# Patient Record
Sex: MALE | Race: WHITE | NOT HISPANIC OR LATINO | Employment: OTHER | URBAN - NONMETROPOLITAN AREA
[De-identification: names, ages, dates, MRNs, and addresses within clinical notes are randomized per-mention and may not be internally consistent; named-entity substitution may affect disease eponyms.]

---

## 2017-01-24 RX ORDER — VALSARTAN AND HYDROCHLOROTHIAZIDE 320; 25 MG/1; MG/1
1 TABLET, FILM COATED ORAL DAILY
Qty: 30 TABLET | Refills: 5 | Status: SHIPPED | OUTPATIENT
Start: 2017-01-24 | End: 2017-01-25 | Stop reason: SDUPTHER

## 2017-01-25 RX ORDER — VALSARTAN AND HYDROCHLOROTHIAZIDE 320; 25 MG/1; MG/1
1 TABLET, FILM COATED ORAL DAILY
Qty: 30 TABLET | Refills: 0 | Status: SHIPPED | OUTPATIENT
Start: 2017-01-25 | End: 2017-02-24

## 2017-01-25 RX ORDER — PRAVASTATIN SODIUM 40 MG
40 TABLET ORAL DAILY
Qty: 90 TABLET | Refills: 0 | Status: SHIPPED | OUTPATIENT
Start: 2017-01-25 | End: 2017-03-22 | Stop reason: SDUPTHER

## 2017-02-24 RX ORDER — VALSARTAN AND HYDROCHLOROTHIAZIDE 320; 25 MG/1; MG/1
1 TABLET, FILM COATED ORAL DAILY
Qty: 30 TABLET | Refills: 0 | Status: CANCELLED | OUTPATIENT
Start: 2017-02-24 | End: 2018-02-24

## 2017-02-24 RX ORDER — VALSARTAN AND HYDROCHLOROTHIAZIDE 320; 25 MG/1; MG/1
1 TABLET, FILM COATED ORAL DAILY
Qty: 30 TABLET | Refills: 0 | Status: SHIPPED | OUTPATIENT
Start: 2017-02-24 | End: 2017-03-22 | Stop reason: SDUPTHER

## 2017-03-01 DIAGNOSIS — R53.81 MALAISE AND FATIGUE: ICD-10-CM

## 2017-03-01 DIAGNOSIS — E55.9 VITAMIN D DEFICIENCY: ICD-10-CM

## 2017-03-01 DIAGNOSIS — E61.1 IRON DEFICIENCY: ICD-10-CM

## 2017-03-01 DIAGNOSIS — R53.83 MALAISE AND FATIGUE: ICD-10-CM

## 2017-03-01 DIAGNOSIS — R73.9 HYPERGLYCEMIA: ICD-10-CM

## 2017-03-01 DIAGNOSIS — I10 ESSENTIAL HYPERTENSION: Primary | ICD-10-CM

## 2017-03-11 ENCOUNTER — APPOINTMENT (OUTPATIENT)
Dept: LAB | Facility: HOSPITAL | Age: 67
End: 2017-03-11

## 2017-03-11 LAB
25(OH)D3 SERPL-MCNC: 44.3 NG/ML (ref 30–100)
ALBUMIN SERPL-MCNC: 4.5 G/DL (ref 3.4–4.8)
ALBUMIN/GLOB SERPL: 1.4 G/DL (ref 1.1–1.8)
ALP SERPL-CCNC: 81 U/L (ref 38–126)
ALT SERPL W P-5'-P-CCNC: 32 U/L (ref 21–72)
ANION GAP SERPL CALCULATED.3IONS-SCNC: 12 MMOL/L (ref 5–15)
ARTICHOKE IGE QN: 67 MG/DL (ref 1–129)
AST SERPL-CCNC: 28 U/L (ref 17–59)
BASOPHILS # BLD AUTO: 0.04 10*3/MM3 (ref 0–0.2)
BASOPHILS NFR BLD AUTO: 0.8 % (ref 0–2)
BILIRUB SERPL-MCNC: 1 MG/DL (ref 0.2–1.3)
BUN BLD-MCNC: 15 MG/DL (ref 7–21)
BUN/CREAT SERPL: 12.8 (ref 7–25)
CALCIUM SPEC-SCNC: 9.8 MG/DL (ref 8.4–10.2)
CHLORIDE SERPL-SCNC: 99 MMOL/L (ref 95–110)
CHOLEST SERPL-MCNC: 137 MG/DL (ref 0–199)
CO2 SERPL-SCNC: 29 MMOL/L (ref 22–31)
CREAT BLD-MCNC: 1.17 MG/DL (ref 0.7–1.3)
DEPRECATED RDW RBC AUTO: 41.1 FL (ref 35.1–43.9)
EOSINOPHIL # BLD AUTO: 0.23 10*3/MM3 (ref 0–0.7)
EOSINOPHIL NFR BLD AUTO: 4.4 % (ref 0–7)
ERYTHROCYTE [DISTWIDTH] IN BLOOD BY AUTOMATED COUNT: 13.5 % (ref 11.5–14.5)
GFR SERPL CREATININE-BSD FRML MDRD: 62 ML/MIN/1.73 (ref 60–113)
GLOBULIN UR ELPH-MCNC: 3.3 GM/DL (ref 2.3–3.5)
GLUCOSE BLD-MCNC: 110 MG/DL (ref 60–100)
HBA1C MFR BLD: 6.28 % (ref 4–5.6)
HCT VFR BLD AUTO: 48.4 % (ref 39–49)
HDLC SERPL-MCNC: 43 MG/DL (ref 60–200)
HGB BLD-MCNC: 17 G/DL (ref 13.7–17.3)
IMM GRANULOCYTES # BLD: 0 10*3/MM3 (ref 0–0.02)
IMM GRANULOCYTES NFR BLD: 0 % (ref 0–0.5)
IRON 24H UR-MRATE: 120 MCG/DL (ref 49–181)
LDLC/HDLC SERPL: 1.74 {RATIO} (ref 0–3.55)
LYMPHOCYTES # BLD AUTO: 1.17 10*3/MM3 (ref 0.6–4.2)
LYMPHOCYTES NFR BLD AUTO: 22.6 % (ref 10–50)
MAGNESIUM SERPL-MCNC: 1.8 MG/DL (ref 1.6–2.3)
MCH RBC QN AUTO: 29.3 PG (ref 26.5–34)
MCHC RBC AUTO-ENTMCNC: 35.1 G/DL (ref 31.5–36.3)
MCV RBC AUTO: 83.4 FL (ref 80–98)
MONOCYTES # BLD AUTO: 0.63 10*3/MM3 (ref 0–0.9)
MONOCYTES NFR BLD AUTO: 12.2 % (ref 0–12)
NEUTROPHILS # BLD AUTO: 3.11 10*3/MM3 (ref 2–8.6)
NEUTROPHILS NFR BLD AUTO: 60 % (ref 37–80)
NRBC BLD MANUAL-RTO: 0 /100 WBC (ref 0–0)
PLATELET # BLD AUTO: 177 10*3/MM3 (ref 150–450)
PMV BLD AUTO: 9.8 FL (ref 8–12)
POTASSIUM BLD-SCNC: 4.3 MMOL/L (ref 3.5–5.1)
PROT SERPL-MCNC: 7.8 G/DL (ref 6.3–8.6)
RBC # BLD AUTO: 5.8 10*6/MM3 (ref 4.37–5.74)
SODIUM BLD-SCNC: 140 MMOL/L (ref 137–145)
TRIGL SERPL-MCNC: 95 MG/DL (ref 20–199)
TSH SERPL DL<=0.05 MIU/L-ACNC: 2.68 MIU/ML (ref 0.46–4.68)
VIT B12 BLD-MCNC: 588 PG/ML (ref 239–931)
WBC NRBC COR # BLD: 5.18 10*3/MM3 (ref 3.2–9.8)

## 2017-03-11 PROCEDURE — 80053 COMPREHEN METABOLIC PANEL: CPT | Performed by: NURSE PRACTITIONER

## 2017-03-11 PROCEDURE — 84443 ASSAY THYROID STIM HORMONE: CPT | Performed by: NURSE PRACTITIONER

## 2017-03-11 PROCEDURE — 82607 VITAMIN B-12: CPT | Performed by: NURSE PRACTITIONER

## 2017-03-11 PROCEDURE — 80061 LIPID PANEL: CPT | Performed by: NURSE PRACTITIONER

## 2017-03-11 PROCEDURE — 82306 VITAMIN D 25 HYDROXY: CPT | Performed by: NURSE PRACTITIONER

## 2017-03-11 PROCEDURE — 83036 HEMOGLOBIN GLYCOSYLATED A1C: CPT | Performed by: NURSE PRACTITIONER

## 2017-03-11 PROCEDURE — 83540 ASSAY OF IRON: CPT | Performed by: NURSE PRACTITIONER

## 2017-03-11 PROCEDURE — 83735 ASSAY OF MAGNESIUM: CPT | Performed by: NURSE PRACTITIONER

## 2017-03-11 PROCEDURE — 85025 COMPLETE CBC W/AUTO DIFF WBC: CPT | Performed by: NURSE PRACTITIONER

## 2017-03-22 ENCOUNTER — APPOINTMENT (OUTPATIENT)
Dept: LAB | Facility: HOSPITAL | Age: 67
End: 2017-03-22

## 2017-03-22 ENCOUNTER — OFFICE VISIT (OUTPATIENT)
Dept: FAMILY MEDICINE CLINIC | Facility: CLINIC | Age: 67
End: 2017-03-22

## 2017-03-22 VITALS
BODY MASS INDEX: 33.04 KG/M2 | DIASTOLIC BLOOD PRESSURE: 72 MMHG | SYSTOLIC BLOOD PRESSURE: 128 MMHG | HEIGHT: 71 IN | WEIGHT: 236 LBS

## 2017-03-22 DIAGNOSIS — Z12.5 SCREENING FOR PROSTATE CANCER: Primary | ICD-10-CM

## 2017-03-22 DIAGNOSIS — R53.81 MALAISE: ICD-10-CM

## 2017-03-22 DIAGNOSIS — I10 ESSENTIAL HYPERTENSION: ICD-10-CM

## 2017-03-22 DIAGNOSIS — Z00.00 GENERAL MEDICAL EXAM: ICD-10-CM

## 2017-03-22 DIAGNOSIS — R09.89 BRUIT OF LEFT CAROTID ARTERY: ICD-10-CM

## 2017-03-22 DIAGNOSIS — E78.2 MIXED HYPERLIPIDEMIA: ICD-10-CM

## 2017-03-22 LAB — PSA SERPL-MCNC: 1.09 NG/ML (ref 0–4)

## 2017-03-22 PROCEDURE — 36415 COLL VENOUS BLD VENIPUNCTURE: CPT | Performed by: NURSE PRACTITIONER

## 2017-03-22 PROCEDURE — 99214 OFFICE O/P EST MOD 30 MIN: CPT | Performed by: NURSE PRACTITIONER

## 2017-03-22 PROCEDURE — 84153 ASSAY OF PSA TOTAL: CPT | Performed by: NURSE PRACTITIONER

## 2017-03-22 PROCEDURE — 80074 ACUTE HEPATITIS PANEL: CPT | Performed by: NURSE PRACTITIONER

## 2017-03-22 RX ORDER — AMLODIPINE BESYLATE 10 MG/1
10 TABLET ORAL DAILY
Qty: 90 TABLET | Refills: 3 | Status: SHIPPED | OUTPATIENT
Start: 2017-03-22 | End: 2018-03-23

## 2017-03-22 RX ORDER — RANITIDINE 150 MG/1
150 TABLET ORAL 2 TIMES DAILY
COMMUNITY
End: 2022-06-22 | Stop reason: RX

## 2017-03-22 RX ORDER — HYDROXYCHLOROQUINE SULFATE 200 MG/1
200 TABLET, FILM COATED ORAL 2 TIMES DAILY
Qty: 180 TABLET | Refills: 3 | Status: SHIPPED | OUTPATIENT
Start: 2017-03-22 | End: 2018-01-17

## 2017-03-22 RX ORDER — VALSARTAN AND HYDROCHLOROTHIAZIDE 320; 25 MG/1; MG/1
1 TABLET, FILM COATED ORAL DAILY
Qty: 90 TABLET | Refills: 3 | Status: SHIPPED | OUTPATIENT
Start: 2017-03-22 | End: 2018-01-17

## 2017-03-22 RX ORDER — PRAVASTATIN SODIUM 40 MG
40 TABLET ORAL DAILY
Qty: 90 TABLET | Refills: 3 | Status: SHIPPED | OUTPATIENT
Start: 2017-03-22 | End: 2018-03-23

## 2017-03-22 NOTE — PROGRESS NOTES
Chief Complaint   Patient presents with   • Follow-up     6 month nahid and refills   • Diabetes   • Hypertension   • Hyperlipidemia     Subjective   Henrik Sands is a 67 y.o. male.     Diabetes   He presents for his follow-up diabetic visit. Diabetes type: hyperglycemia only for now  No MedicAlert identification noted. Hypoglycemia symptoms include headaches. Pertinent negatives for hypoglycemia include no pallor or sweats. Associated symptoms include fatigue. Pertinent negatives for diabetes include no blurred vision and no chest pain. Pertinent negatives for diabetic complications include no CVA, PVD or retinopathy. Risk factors for coronary artery disease include obesity, male sex and sedentary lifestyle. Current diabetic treatment includes diet. He is compliant with treatment some of the time. He rarely participates in exercise. He does not see a podiatrist.Eye exam is current.   Hypertension   This is a recurrent problem. The current episode started more than 1 year ago. The problem has been gradually improving since onset. The problem is controlled. Associated symptoms include headaches, malaise/fatigue and neck pain. Pertinent negatives include no anxiety, blurred vision, chest pain, orthopnea, palpitations, peripheral edema, PND, shortness of breath or sweats. There are no associated agents to hypertension. Risk factors for coronary artery disease include stress and post-menopausal state. Past treatments include ACE inhibitors, angiotensin blockers and alpha 1 blockers. The current treatment provides mild improvement. Compliance problems include diet.  There is no history of angina, kidney disease, CAD/MI, CVA, heart failure, left ventricular hypertrophy, PVD, renovascular disease, retinopathy or a thyroid problem. There is no history of chronic renal disease, coarctation of the aorta, hyperaldosteronism, hypercortisolism, hyperparathyroidism, a hypertension causing med, pheochromocytoma or sleep  apnea.   Hyperlipidemia   This is a chronic problem. The current episode started more than 1 year ago. The problem is controlled. Recent lipid tests were reviewed and are normal. Exacerbating diseases include obesity. He has no history of chronic renal disease, diabetes, hypothyroidism, liver disease or nephrotic syndrome. There are no known factors aggravating his hyperlipidemia. Associated symptoms include myalgias. Pertinent negatives include no chest pain, focal sensory loss, leg pain or shortness of breath. Current antihyperlipidemic treatment includes diet change and statins. The current treatment provides significant improvement of lipids. Compliance problems include adherence to diet.  Risk factors for coronary artery disease include male sex, obesity and a sedentary lifestyle.        The following portions of the patient's history were reviewed and updated as appropriate: allergies, current medications, past social history and problem list.    Review of Systems   Constitutional: Positive for activity change, appetite change, fatigue, malaise/fatigue and unexpected weight change. Negative for chills, diaphoresis and fever.   HENT: Negative for sinus pressure, sneezing, sore throat, tinnitus, trouble swallowing and voice change.    Eyes: Positive for visual disturbance. Negative for blurred vision, discharge and itching.        Chronic vision loss    Respiratory: Negative.  Negative for apnea, choking, chest tightness and shortness of breath.    Cardiovascular: Negative.  Negative for chest pain, palpitations, orthopnea and PND.   Gastrointestinal: Negative.    Endocrine: Negative.    Genitourinary: Positive for frequency. Negative for decreased urine volume.   Musculoskeletal: Positive for arthralgias, back pain, gait problem, joint swelling, myalgias, neck pain and neck stiffness.   Skin: Negative.  Negative for color change and pallor.   Allergic/Immunologic: Negative.    Neurological: Positive for  "headaches.   Hematological: Negative.    Psychiatric/Behavioral: Negative.        Objective   /72  Ht 71\" (180.3 cm)  Wt 236 lb (107 kg)  BMI 32.92 kg/m2  Physical Exam   Constitutional: He appears well-developed and well-nourished.   HENT:   Head: Normocephalic and atraumatic.   Eyes: EOM are normal. Pupils are equal, round, and reactive to light.   Neck: Normal range of motion. Neck supple.   Cardiovascular: Normal rate, regular rhythm and normal heart sounds.  Exam reveals no gallop and no friction rub.    No murmur heard.  Bruit left carotid mild    Pulmonary/Chest: Effort normal and breath sounds normal.   Abdominal: Soft. Bowel sounds are normal.   Genitourinary: Rectum normal and penis normal.   Musculoskeletal: Normal range of motion.   Neurological: He is alert.   Skin: Skin is warm.   Psychiatric: He has a normal mood and affect.   Nursing note and vitals reviewed.      Assessment/Plan   Problem List Items Addressed This Visit        Cardiovascular and Mediastinum    Bruit of left carotid artery    Relevant Orders    Duplex Carotid Ultrasound CAR    Essential hypertension    Mixed hyperlipidemia       Other    General medical exam    Relevant Orders    Hepatitis panel, acute    Screening for prostate cancer - Primary    Relevant Orders    PSA    Hepatitis panel, acute    Malaise           New Medications Ordered This Visit   Medications   • raNITIdine (ZANTAC) 150 MG tablet     Sig: Take 150 mg by mouth 2 (Two) Times a Day.       It's not just what you eat, but when you eat  Eat breakfast, and eat smaller meals throughout the day. A healthy breakfast can jumpstart your metabolism, while eating small, healthy meals (rather than the standard three large meals) keeps your energy up.   Avoid eating at night. Try to eat dinner earlier and fast for 14-16 hours until breakfast the next morning. Studies suggest that eating only when you’re most active and giving your digestive system a long break each day " may help to regulate weight.     Labs in 6 months -recheck with a1c-monitor diet closely

## 2017-03-24 LAB
HAV IGM SERPL QL IA: NEGATIVE
HBV CORE IGM SERPL QL IA: NEGATIVE
HBV SURFACE AG SERPL QL IA: NEGATIVE
HCV AB SER DONR QL: NEGATIVE

## 2017-04-03 DIAGNOSIS — R09.89 BRUIT OF LEFT CAROTID ARTERY: Primary | ICD-10-CM

## 2017-04-04 LAB
BH CV XLRA MEAS LEFT DIST CCA EDV: 15 CM/SEC
BH CV XLRA MEAS LEFT DIST CCA PSV: 136 CM/SEC
BH CV XLRA MEAS LEFT DIST ICA EDV: 22 CM/SEC
BH CV XLRA MEAS LEFT DIST ICA PSV: 104 CM/SEC
BH CV XLRA MEAS LEFT MID ICA EDV: 17 CM/SEC
BH CV XLRA MEAS LEFT MID ICA PSV: 78 CM/SEC
BH CV XLRA MEAS LEFT PROX CCA EDV: 17 CM/SEC
BH CV XLRA MEAS LEFT PROX CCA PSV: 148 CM/SEC
BH CV XLRA MEAS LEFT PROX ECA EDV: 11 CM/SEC
BH CV XLRA MEAS LEFT PROX ECA PSV: 151 CM/SEC
BH CV XLRA MEAS LEFT PROX ICA EDV: 16 CM/SEC
BH CV XLRA MEAS LEFT PROX ICA PSV: 106 CM/SEC
BH CV XLRA MEAS LEFT VERTEBRAL A PSV: 48 CM/SEC
BH CV XLRA MEAS RIGHT DIST CCA EDV: 19 CM/SEC
BH CV XLRA MEAS RIGHT DIST CCA PSV: 131 CM/SEC
BH CV XLRA MEAS RIGHT DIST ICA EDV: 14 CM/SEC
BH CV XLRA MEAS RIGHT DIST ICA PSV: 64 CM/SEC
BH CV XLRA MEAS RIGHT MID ICA EDV: 17 CM/SEC
BH CV XLRA MEAS RIGHT MID ICA PSV: 91 CM/SEC
BH CV XLRA MEAS RIGHT PROX CCA EDV: 12 CM/SEC
BH CV XLRA MEAS RIGHT PROX CCA PSV: 164 CM/SEC
BH CV XLRA MEAS RIGHT PROX ECA EDV: 10 CM/SEC
BH CV XLRA MEAS RIGHT PROX ECA PSV: 114 CM/SEC
BH CV XLRA MEAS RIGHT PROX ICA EDV: 10 CM/SEC
BH CV XLRA MEAS RIGHT PROX ICA PSV: 90 CM/SEC
BH CV XLRA MEAS RIGHT VERTEBRAL A EDV: 0 CM/SEC
BH CV XLRA MEAS RIGHT VERTEBRAL A PSV: 55 CM/SEC

## 2017-04-10 ENCOUNTER — TELEPHONE (OUTPATIENT)
Dept: FAMILY MEDICINE CLINIC | Facility: CLINIC | Age: 67
End: 2017-04-10

## 2017-04-10 NOTE — TELEPHONE ENCOUNTER
Pr NOHEMY Marshall's instruction  Jessie his wife has been called with his results    ---- Message from NOHEMY May sent at 4/7/2017 12:40 PM CDT -----  Can you let Jessie Sands know this looks good-under 50 is good- no changes needed-This is Jessie's  and he cannot hear-She works in [employer]

## 2017-06-29 RX ORDER — ALLOPURINOL 100 MG/1
100 TABLET ORAL 2 TIMES DAILY
Qty: 60 TABLET | Refills: 5 | Status: SHIPPED | OUTPATIENT
Start: 2017-06-29 | End: 2017-08-09 | Stop reason: SDUPTHER

## 2017-08-07 ENCOUNTER — TELEPHONE (OUTPATIENT)
Dept: FAMILY MEDICINE CLINIC | Facility: CLINIC | Age: 67
End: 2017-08-07

## 2017-08-07 NOTE — TELEPHONE ENCOUNTER
CLARA DOMINIQUE IS NEEDING A REFILL ON ALLOPURINOL TO BE SENT TO [facility]  YOU CAN CALL ME AT [phone] [phone] IF QUESTIONS

## 2017-08-09 RX ORDER — ALLOPURINOL 100 MG/1
100 TABLET ORAL 2 TIMES DAILY
Qty: 60 TABLET | Refills: 5 | Status: SHIPPED | OUTPATIENT
Start: 2017-08-09 | End: 2018-01-17

## 2017-08-25 ENCOUNTER — TELEPHONE (OUTPATIENT)
Dept: FAMILY MEDICINE CLINIC | Facility: CLINIC | Age: 67
End: 2017-08-25

## 2017-08-25 RX ORDER — LANCETS 28 GAUGE
EACH MISCELLANEOUS
Qty: 100 EACH | Refills: 12 | Status: SHIPPED | OUTPATIENT
Start: 2017-08-25 | End: 2018-11-19 | Stop reason: SDUPTHER

## 2017-08-25 NOTE — TELEPHONE ENCOUNTER
CLARA DOMINIQUE IS NEEDING A NEW PRESP FOR HIS FREE STYLE TEST STRIPS AND LANCETS TO BE SENT [facility]

## 2017-10-18 ENCOUNTER — TELEPHONE (OUTPATIENT)
Dept: FAMILY MEDICINE CLINIC | Facility: CLINIC | Age: 67
End: 2017-10-18

## 2018-01-17 RX ORDER — ALLOPURINOL 100 MG/1
100 TABLET ORAL
Qty: 60 TABLET | Refills: 5 | Status: SHIPPED | OUTPATIENT
Start: 2018-01-17 | End: 2018-08-07

## 2018-01-17 RX ORDER — VALSARTAN AND HYDROCHLOROTHIAZIDE 320; 25 MG/1; MG/1
1 TABLET, FILM COATED ORAL DAILY
Qty: 90 TABLET | Refills: 3 | Status: SHIPPED | OUTPATIENT
Start: 2018-01-17 | End: 2019-01-09

## 2018-01-17 RX ORDER — HYDROXYCHLOROQUINE SULFATE 200 MG/1
200 TABLET, FILM COATED ORAL
Qty: 180 TABLET | Refills: 3 | Status: SHIPPED | OUTPATIENT
Start: 2018-01-17 | End: 2019-02-04 | Stop reason: SDUPTHER

## 2018-02-05 ENCOUNTER — TELEPHONE (OUTPATIENT)
Dept: FAMILY MEDICINE CLINIC | Facility: CLINIC | Age: 68
End: 2018-02-05

## 2018-02-05 RX ORDER — POTASSIUM CITRATE 10 MEQ/1
20 TABLET, EXTENDED RELEASE ORAL 4 TIMES DAILY
Qty: 240 TABLET | Refills: 5 | Status: SHIPPED | OUTPATIENT
Start: 2018-02-05 | End: 2019-01-09

## 2018-02-05 NOTE — TELEPHONE ENCOUNTER
CLARA DOMINIQUE IS NEEDING A REFILL ON THE POTASSIUM TO BE SENT TO [facility]..he IS NOT PLANNING ON GETTING OUT TIL THE FLU SUBSIDES AT THE CLINIC..SO PLEASE SEND REFILLS AND WE WILL MAKE APPT FOR CK LATER

## 2018-03-23 RX ORDER — AMLODIPINE BESYLATE 10 MG/1
10 TABLET ORAL DAILY
Qty: 90 TABLET | Refills: 0 | Status: SHIPPED | OUTPATIENT
Start: 2018-03-23 | End: 2018-07-11

## 2018-03-23 RX ORDER — PRAVASTATIN SODIUM 40 MG
40 TABLET ORAL DAILY
Qty: 90 TABLET | Refills: 0 | Status: SHIPPED | OUTPATIENT
Start: 2018-03-23 | End: 2018-07-13 | Stop reason: SDUPTHER

## 2018-04-11 ENCOUNTER — TELEPHONE (OUTPATIENT)
Dept: FAMILY MEDICINE CLINIC | Facility: CLINIC | Age: 68
End: 2018-04-11

## 2018-04-11 DIAGNOSIS — I10 ESSENTIAL HYPERTENSION: Primary | ICD-10-CM

## 2018-04-11 DIAGNOSIS — R73.9 HYPERGLYCEMIA: ICD-10-CM

## 2018-04-11 DIAGNOSIS — R53.81 MALAISE: ICD-10-CM

## 2018-04-11 DIAGNOSIS — Z12.5 ENCOUNTER FOR SCREENING FOR MALIGNANT NEOPLASM OF PROSTATE: ICD-10-CM

## 2018-04-11 DIAGNOSIS — R30.0 DYSURIA: ICD-10-CM

## 2018-04-11 DIAGNOSIS — R35.1 NOCTURIA: ICD-10-CM

## 2018-04-11 DIAGNOSIS — E78.2 MIXED HYPERLIPIDEMIA: ICD-10-CM

## 2018-04-21 ENCOUNTER — APPOINTMENT (OUTPATIENT)
Dept: LAB | Facility: HOSPITAL | Age: 68
End: 2018-04-21

## 2018-04-21 LAB
ALBUMIN SERPL-MCNC: 4.6 G/DL (ref 3.4–4.8)
ALBUMIN/GLOB SERPL: 1.4 G/DL (ref 1.1–1.8)
ALP SERPL-CCNC: 84 U/L (ref 38–126)
ALT SERPL W P-5'-P-CCNC: 44 U/L (ref 21–72)
ANION GAP SERPL CALCULATED.3IONS-SCNC: 16 MMOL/L (ref 5–15)
ARTICHOKE IGE QN: 68 MG/DL (ref 1–129)
AST SERPL-CCNC: 33 U/L (ref 17–59)
BASOPHILS # BLD AUTO: 0.02 10*3/MM3 (ref 0–0.2)
BASOPHILS NFR BLD AUTO: 0.4 % (ref 0–2)
BILIRUB SERPL-MCNC: 0.9 MG/DL (ref 0.2–1.3)
BUN BLD-MCNC: 17 MG/DL (ref 7–21)
BUN/CREAT SERPL: 15 (ref 7–25)
CALCIUM SPEC-SCNC: 9.8 MG/DL (ref 8.4–10.2)
CHLORIDE SERPL-SCNC: 96 MMOL/L (ref 95–110)
CHOLEST SERPL-MCNC: 143 MG/DL (ref 0–199)
CO2 SERPL-SCNC: 30 MMOL/L (ref 22–31)
CREAT BLD-MCNC: 1.13 MG/DL (ref 0.7–1.3)
DEPRECATED RDW RBC AUTO: 40.9 FL (ref 35.1–43.9)
EOSINOPHIL # BLD AUTO: 0.18 10*3/MM3 (ref 0–0.7)
EOSINOPHIL NFR BLD AUTO: 3.6 % (ref 0–7)
ERYTHROCYTE [DISTWIDTH] IN BLOOD BY AUTOMATED COUNT: 13.5 % (ref 11.5–14.5)
GFR SERPL CREATININE-BSD FRML MDRD: 64 ML/MIN/1.73 (ref 42–98)
GLOBULIN UR ELPH-MCNC: 3.4 GM/DL (ref 2.3–3.5)
GLUCOSE BLD-MCNC: 107 MG/DL (ref 60–100)
HCT VFR BLD AUTO: 48.9 % (ref 39–49)
HDLC SERPL-MCNC: 40 MG/DL (ref 60–200)
HGB BLD-MCNC: 17.2 G/DL (ref 13.7–17.3)
HOLD SPECIMEN: NORMAL
IMM GRANULOCYTES # BLD: 0.01 10*3/MM3 (ref 0–0.02)
IMM GRANULOCYTES NFR BLD: 0.2 % (ref 0–0.5)
LDLC/HDLC SERPL: 2.1 {RATIO} (ref 0–3.55)
LYMPHOCYTES # BLD AUTO: 1.12 10*3/MM3 (ref 0.6–4.2)
LYMPHOCYTES NFR BLD AUTO: 22.7 % (ref 10–50)
MAGNESIUM SERPL-MCNC: 1.9 MG/DL (ref 1.6–2.3)
MCH RBC QN AUTO: 29.4 PG (ref 26.5–34)
MCHC RBC AUTO-ENTMCNC: 35.2 G/DL (ref 31.5–36.3)
MCV RBC AUTO: 83.6 FL (ref 80–98)
MONOCYTES # BLD AUTO: 0.55 10*3/MM3 (ref 0–0.9)
MONOCYTES NFR BLD AUTO: 11.1 % (ref 0–12)
NEUTROPHILS # BLD AUTO: 3.06 10*3/MM3 (ref 2–8.6)
NEUTROPHILS NFR BLD AUTO: 62 % (ref 37–80)
PLATELET # BLD AUTO: 170 10*3/MM3 (ref 150–450)
PMV BLD AUTO: 9.6 FL (ref 8–12)
POTASSIUM BLD-SCNC: 3.9 MMOL/L (ref 3.5–5.1)
PROT SERPL-MCNC: 8 G/DL (ref 6.3–8.6)
RBC # BLD AUTO: 5.85 10*6/MM3 (ref 4.37–5.74)
SODIUM BLD-SCNC: 142 MMOL/L (ref 137–145)
TRIGL SERPL-MCNC: 95 MG/DL (ref 20–199)
TSH SERPL DL<=0.05 MIU/L-ACNC: 3.36 MIU/ML (ref 0.46–4.68)
VIT B12 BLD-MCNC: 540 PG/ML (ref 239–931)
WBC NRBC COR # BLD: 4.94 10*3/MM3 (ref 3.2–9.8)

## 2018-04-21 PROCEDURE — 80061 LIPID PANEL: CPT | Performed by: NURSE PRACTITIONER

## 2018-04-21 PROCEDURE — 80053 COMPREHEN METABOLIC PANEL: CPT | Performed by: NURSE PRACTITIONER

## 2018-04-21 PROCEDURE — 83735 ASSAY OF MAGNESIUM: CPT | Performed by: NURSE PRACTITIONER

## 2018-04-21 PROCEDURE — 82607 VITAMIN B-12: CPT | Performed by: NURSE PRACTITIONER

## 2018-04-21 PROCEDURE — 36415 COLL VENOUS BLD VENIPUNCTURE: CPT | Performed by: NURSE PRACTITIONER

## 2018-04-21 PROCEDURE — 84443 ASSAY THYROID STIM HORMONE: CPT | Performed by: NURSE PRACTITIONER

## 2018-04-21 PROCEDURE — G0103 PSA SCREENING: HCPCS | Performed by: NURSE PRACTITIONER

## 2018-04-21 PROCEDURE — 86803 HEPATITIS C AB TEST: CPT | Performed by: NURSE PRACTITIONER

## 2018-04-21 PROCEDURE — 83036 HEMOGLOBIN GLYCOSYLATED A1C: CPT | Performed by: NURSE PRACTITIONER

## 2018-04-21 PROCEDURE — 85025 COMPLETE CBC W/AUTO DIFF WBC: CPT | Performed by: NURSE PRACTITIONER

## 2018-04-22 LAB — HBA1C MFR BLD: 6 % (ref 4–5.6)

## 2018-04-25 ENCOUNTER — OFFICE VISIT (OUTPATIENT)
Dept: FAMILY MEDICINE CLINIC | Facility: CLINIC | Age: 68
End: 2018-04-25

## 2018-04-25 VITALS
BODY MASS INDEX: 32.34 KG/M2 | HEIGHT: 71 IN | SYSTOLIC BLOOD PRESSURE: 142 MMHG | DIASTOLIC BLOOD PRESSURE: 68 MMHG | WEIGHT: 231 LBS

## 2018-04-25 DIAGNOSIS — R06.09 DYSPNEA ON EXERTION: ICD-10-CM

## 2018-04-25 DIAGNOSIS — R07.9 CHEST PAIN, UNSPECIFIED TYPE: ICD-10-CM

## 2018-04-25 DIAGNOSIS — Z12.5 SCREENING FOR PROSTATE CANCER: Primary | ICD-10-CM

## 2018-04-25 DIAGNOSIS — E78.2 MIXED HYPERLIPIDEMIA: ICD-10-CM

## 2018-04-25 DIAGNOSIS — I10 ESSENTIAL HYPERTENSION: ICD-10-CM

## 2018-04-25 DIAGNOSIS — Z00.00 GENERAL MEDICAL EXAM: ICD-10-CM

## 2018-04-25 LAB
HCV AB SER DONR QL: NEGATIVE
PSA SERPL-MCNC: 0.96 NG/ML (ref 0–4)

## 2018-04-25 PROCEDURE — 99214 OFFICE O/P EST MOD 30 MIN: CPT | Performed by: NURSE PRACTITIONER

## 2018-04-25 PROCEDURE — 93005 ELECTROCARDIOGRAM TRACING: CPT | Performed by: NURSE PRACTITIONER

## 2018-04-25 PROCEDURE — 93010 ELECTROCARDIOGRAM REPORT: CPT | Performed by: INTERNAL MEDICINE

## 2018-04-25 RX ORDER — ALBUTEROL SULFATE 90 UG/1
2 AEROSOL, METERED RESPIRATORY (INHALATION) EVERY 4 HOURS PRN
Qty: 18 G | Refills: 11 | Status: SHIPPED | OUTPATIENT
Start: 2018-04-25

## 2018-04-25 RX ORDER — ALBUTEROL SULFATE 90 UG/1
2 AEROSOL, METERED RESPIRATORY (INHALATION) EVERY 4 HOURS PRN
COMMUNITY
End: 2018-04-25 | Stop reason: SDUPTHER

## 2018-04-25 NOTE — PROGRESS NOTES
Chief Complaint   Patient presents with   • Annual Exam     yrly labs      Subjective   Henrik Sands is a 68 y.o. male.     Presents with 6 month recheck of meds and labs -had 1 episode  of passing out about 3 weeks-having chest pain with activity -is having chest pain and shortness of breath with activity       Hypertension   This is a chronic problem. The current episode started more than 1 month ago. The problem has been gradually worsening since onset. The problem is controlled. Associated symptoms include headaches, malaise/fatigue and neck pain. Pertinent negatives include no anxiety, blurred vision, chest pain, orthopnea, palpitations, peripheral edema, PND, shortness of breath or sweats. Risk factors for coronary artery disease include dyslipidemia, male gender and sedentary lifestyle. Past treatments include ACE inhibitors, calcium channel blockers and beta blockers. Current antihypertension treatment includes alpha 1 blockers. The current treatment provides mild improvement. Compliance problems include diet.  Hypertensive end-organ damage includes angina. There is no history of kidney disease, CAD/MI, CVA, heart failure, left ventricular hypertrophy, PVD, renovascular disease, retinopathy or a thyroid problem. There is no history of chronic renal disease, coarctation of the aorta, hyperaldosteronism, hypercortisolism, hyperparathyroidism, a hypertension causing med, pheochromocytoma or sleep apnea.   Hyperlipidemia   This is a chronic problem. The problem is controlled. Recent lipid tests were reviewed and are normal. He has no history of chronic renal disease, diabetes, hypothyroidism, liver disease, obesity or nephrotic syndrome. Associated symptoms include myalgias. Pertinent negatives include no chest pain or shortness of breath. The current treatment provides mild improvement of lipids. Compliance problems include adherence to diet.  Risk factors for coronary artery disease include  "dyslipidemia and hypertension.        The following portions of the patient's history were reviewed and updated as appropriate: allergies, current medications, past social history and problem list.    Review of Systems   Constitutional: Positive for activity change, appetite change, fatigue, malaise/fatigue and unexpected weight change. Negative for diaphoresis and fever.   HENT: Negative for congestion, dental problem, sinus pressure, sneezing, sore throat, tinnitus, trouble swallowing and voice change.    Eyes: Positive for visual disturbance. Negative for blurred vision, pain, discharge, redness and itching.        Chronic vision loss    Respiratory: Negative.  Negative for apnea, cough, choking, chest tightness, shortness of breath, wheezing and stridor.    Cardiovascular: Negative.  Negative for chest pain, palpitations, orthopnea, leg swelling and PND.   Gastrointestinal: Negative.    Endocrine: Negative.    Genitourinary: Positive for frequency. Negative for decreased urine volume.   Musculoskeletal: Positive for arthralgias, back pain, gait problem, joint swelling, myalgias, neck pain and neck stiffness.   Skin: Negative.  Negative for color change and pallor.   Allergic/Immunologic: Negative.  Negative for environmental allergies, food allergies and immunocompromised state.   Neurological: Positive for syncope and headaches.   Hematological: Negative.    Psychiatric/Behavioral: Negative.        Objective   /68   Ht 180.3 cm (71\")   Wt 105 kg (231 lb)   BMI 32.22 kg/m²   Physical Exam   Constitutional: He is oriented to person, place, and time. He appears well-developed and well-nourished.   HENT:   Head: Normocephalic and atraumatic.   Right Ear: External ear normal.   Left Ear: External ear normal.   Mouth/Throat: No oropharyngeal exudate.   Eyes: EOM are normal. Pupils are equal, round, and reactive to light. Right eye exhibits no discharge. Left eye exhibits no discharge.   Neck: Normal range of " motion. Neck supple. No JVD present. No tracheal deviation present. No thyromegaly present.   Cardiovascular: Normal rate, regular rhythm, normal heart sounds and intact distal pulses.  Exam reveals no gallop and no friction rub.    No murmur heard.  Bruit left carotid mild    Pulmonary/Chest: Effort normal and breath sounds normal. No stridor. No respiratory distress. He has no wheezes. He has no rales. He exhibits no tenderness.   Abdominal: Soft. Bowel sounds are normal. He exhibits no distension and no mass. There is no tenderness. There is no rebound and no guarding. No hernia.   Genitourinary: Rectum normal and penis normal.   Musculoskeletal: Normal range of motion. He exhibits no edema, tenderness or deformity.   Lower leg vascular changes    Lymphadenopathy:     He has no cervical adenopathy.   Neurological: He is alert and oriented to person, place, and time. He has normal reflexes. He displays normal reflexes. No cranial nerve deficit. He exhibits normal muscle tone. Coordination normal.   Skin: Skin is warm and dry. No rash noted. No erythema. No pallor.   Psychiatric: He has a normal mood and affect.   Nursing note and vitals reviewed.      Assessment/Plan   Problem List Items Addressed This Visit        Cardiovascular and Mediastinum    Essential hypertension    Mixed hyperlipidemia       Respiratory    Dyspnea on exertion    Relevant Orders    Ambulatory Referral to Cardiology       Nervous and Auditory    Chest pain    Relevant Orders    Ambulatory Referral to Cardiology       Other    General medical exam    Relevant Orders    ECG 12 Lead    Screening for prostate cancer - Primary    Relevant Orders    PSA Screen      Other Visit Diagnoses    None.          New Medications Ordered This Visit   Medications   • albuterol (PROVENTIL HFA;VENTOLIN HFA) 108 (90 Base) MCG/ACT inhaler     Sig: Inhale 2 puffs Every 4 (Four) Hours As Needed for Wheezing.     Dispense:  18 g     Refill:  11       It's not just  what you eat, but when you eat  Eat breakfast, and eat smaller meals throughout the day. A healthy breakfast can jumpstart your metabolism, while eating small, healthy meals (rather than the standard three large meals) keeps your energy up.   Avoid eating at night. Try to eat dinner earlier and fast for 14-16 hours until breakfast the next morning. Studies suggest that eating only when you’re most active and giving your digestive system a long break each day may help to regulate weight.     Monitor symptoms-ekg reviewed in office-if symptoms occur again go to ER for evaluation-otherwise she will be seeing Dr Reyna for further workup -has seen him in the past

## 2018-06-12 ENCOUNTER — DOCUMENTATION (OUTPATIENT)
Dept: CARDIOLOGY | Facility: CLINIC | Age: 68
End: 2018-06-12

## 2018-06-12 ENCOUNTER — OFFICE VISIT (OUTPATIENT)
Dept: CARDIOLOGY | Facility: CLINIC | Age: 68
End: 2018-06-12

## 2018-06-12 VITALS
HEART RATE: 79 BPM | BODY MASS INDEX: 31.5 KG/M2 | DIASTOLIC BLOOD PRESSURE: 80 MMHG | WEIGHT: 225 LBS | HEIGHT: 71 IN | SYSTOLIC BLOOD PRESSURE: 140 MMHG

## 2018-06-12 DIAGNOSIS — R07.9 CHEST PAIN, UNSPECIFIED TYPE: Primary | ICD-10-CM

## 2018-06-12 DIAGNOSIS — R07.9 CHEST PAIN, UNSPECIFIED TYPE: ICD-10-CM

## 2018-06-12 DIAGNOSIS — I10 ESSENTIAL HYPERTENSION: Primary | ICD-10-CM

## 2018-06-12 DIAGNOSIS — E78.2 MIXED HYPERLIPIDEMIA: ICD-10-CM

## 2018-06-12 DIAGNOSIS — R55 VASOVAGAL SYNCOPE: ICD-10-CM

## 2018-06-12 DIAGNOSIS — R00.2 PALPITATION: ICD-10-CM

## 2018-06-12 PROCEDURE — 99244 OFF/OP CNSLTJ NEW/EST MOD 40: CPT | Performed by: INTERNAL MEDICINE

## 2018-06-12 RX ORDER — METOPROLOL SUCCINATE 25 MG/1
25 TABLET, EXTENDED RELEASE ORAL DAILY
Qty: 30 TABLET | Refills: 6 | Status: SHIPPED | OUTPATIENT
Start: 2018-06-12 | End: 2019-01-02 | Stop reason: SDUPTHER

## 2018-06-12 NOTE — PROGRESS NOTES
Henrik Sands  68 y.o. male    06/12/2018  1. Essential hypertension    2. Mixed hyperlipidemia    3. Chest pain, unspecified type    4. Palpitation    5. Vasovagal syncope        History of Present Illness    Mr. Sands is a 68-year-old  male who is being seen by me for the first time.  He is referred for evaluation of intermittent chest pain, palpitation, syncope.  The patient is a poor historian and was unable to give me the exact sequence of events.  About 2 months ago it appears that he had an episode of syncope while doing physical work and it is unclear as to how long he had passed out for.  When he recovered he felt his heart pounding.  He has not had any further episodes of syncope but it appears that he had a similar episode back in 2008.  He has no previous documented coronary artery disease.  The patient reports intermittent episodes of left-sided chest pain which is sharp in character and lasts for a few seconds at a time.  It is not particularly related to exertion.  His old records were reviewed in detail.  He has had episodes of chest pain going back > 10 years and he underwent cardiac catheterization in 2008 which showed no significant epicardial coronary artery disease.  He had cardiac catheterization in 2012 for evaluation of chest pain by Dr. Metzger which showed no significant epicardial CAD.  His LV systolic function has been normal in the past.  His other medical problems include gastroesophageal reflux, hypertension, diabetes mellitus, carcinoma of the colon status post resection and chemotherapy in January 2007, CK D.  However, his GFR was normal in April 2018.        SUBJECTIVE    No Known Allergies      Past Medical History:   Diagnosis Date   • Abdominal bloating    • After-cataract with vision obscured     left   • Allergic rhinitis    • Artificial lens present     both   • Asthma     cough variant   • Asthma     IgE-mediated allergic asthma      • Colonic polyp      Polyp of large intestine - multiple      • Cranial nerve disorder, unspecified      right 3rd x 10days to 2wks      • Diabetes mellitus    • Disorder of skin     lesion to right forehead      • Essential hypertension     which may be renovascular in origin      • MEL (generalized anxiety disorder)    • Generalized colicky abdominal pain    • History of colonic polyps    • History of echocardiogram 04/25/2008    normal systolic function,minimal eft atrial enlargement,aortic root upper limits of normal   • History of malignant neoplasm of colon    • Hyperlipidemia    • Hypokalemia     persistent   • Ischemic optic neuropathy of both eyes    • Kidney stone     with hyperoxaluria now for 1st time      • Long term use of drug    • Malaise and fatigue    • Neoplasm of uncertain behavior of skin    • 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   • Osteoarthritis    • Pain in lower limb    • Primary malignant neoplasm of colon     metastatic,without evidence of recurrent metastase      • Pseudophakia    • Renal impairment     stage 1   • Stasis edema    • Type 2 diabetes mellitus     no BDR   • Vitamin D deficiency     on treatment         Past Surgical History:   Procedure Laterality Date   • CARDIAC CATHETERIZATION  04/25/2008    selective coronary angiography,right iliofemoral,mild nonobstructive CAD,potential for dilated ascending aorta given the necessity of using a JL5   • CATARACT EXTRACTION  06/18/2014    AFTER CATARACT LASER SURGERY 57668 (1)      • CATARACT EXTRACTION W/ INTRAOCULAR LENS IMPLANT Left 12/19/2006   • COLECTOMY PARTIAL / TOTAL  01/31/2007    low anterior resection of the colon with stapled colorectal anastomosis.Middle rectal cancer w/suspected sigmoid serosal implants at the rectosigmoid junction   • COLONOSCOPY W/ POLYPECTOMY  02/11/2016    Patent end-to-end colo-colonic anastomosis.Melanosis in the colon.One 2 mm polyp at 85 cm prox.to anus.Resected and retrieved.One 5 mm polyp at 80 cm prox to anus.Resected and  retrieved.One 1 mm polyp at 40 cm prox. to anus.Resected and retrieved.   • EXCISION LESION  11/06/2001    Electrodesiccation and curettage x 3, right upper back   • EXTRACORPOREAL SHOCK WAVE LITHOTRIPSY (ESWL)  08/24/2005    right extracorporeal shock wave 1000 shocks at an energy level 9.Bilateral ureteral calculus,right one symptomatic.Stone 5 x7   • MEDIPORT INSERTION, SINGLE  04/11/2007    left subclavin Mediport,metastatic colon cancer         History reviewed. No pertinent family history.      Social History     Social History   • Marital status:      Spouse name: N/A   • Number of children: N/A   • Years of education: N/A     Occupational History   • Not on file.     Social History Main Topics   • Smoking status: Never Smoker   • Smokeless tobacco: Not on file   • Alcohol use No   • Drug use: Unknown   • Sexual activity: Not on file     Other Topics Concern   • Not on file     Social History Narrative   • No narrative on file         Current Outpatient Prescriptions   Medication Sig Dispense Refill   • albuterol (PROVENTIL HFA;VENTOLIN HFA) 108 (90 Base) MCG/ACT inhaler Inhale 2 puffs Every 4 (Four) Hours As Needed for Wheezing. 18 g 11   • allopurinol (ZYLOPRIM) 100 MG tablet Take 1 tablet by mouth 2 (Two) Times a Day for gout. 60 tablet 5   • amLODIPine (NORVASC) 10 MG tablet Take 1 tablet by mouth Daily. 90 tablet 0   • glucose blood (FREESTYLE TEST STRIPS) test strip Use to test blood sugar up to 3 times daily. 100 each 12   • hydroxychloroquine (PLAQUENIL) 200 MG tablet Take 1 tablet by mouth 2 (Two) Times a Day. 180 tablet 3   • Lancets (FREESTYLE) lancets Use to test blood sugar up to 3 times daily. 100 each 12   • potassium citrate (UROCIT-K) 10 MEQ (1080 MG) CR tablet Take 2 tablets by mouth 4 (Four) Times a Day for hypokalemia. 240 tablet 5   • pravastatin (PRAVACHOL) 40 MG tablet Take 1 tablet by mouth Daily. 90 tablet 0   • raNITIdine (ZANTAC) 150 MG tablet Take 150 mg by mouth 2 (Two)  "Times a Day.     • valsartan-hydrochlorothiazide (DIOVAN-HCT) 320-25 MG per tablet Take 1 tablet by mouth Daily. 90 tablet 3   • metoprolol succinate XL (TOPROL-XL) 25 MG 24 hr tablet Take 1 tablet by mouth Daily. 30 tablet 6     No current facility-administered medications for this visit.          OBJECTIVE    /80   Pulse 79   Ht 180.3 cm (70.98\")   Wt 102 kg (225 lb)   BMI 31.40 kg/m²         Review of Systems     Constitutional:  Denies recent weight loss, weight gain, fever or chills, no change in exercise tolerance     HENT:  h/o hearing loss, No epistaxis, hoarseness, or difficulty speaking.     Eyes: Wears eyeglasses or contact lenses     Respiratory:  Dyspnea with exertion,no cough, wheezing, or hemoptysis.     Cardiovascular: See history of present illness    Gastrointestinal:  Denies change in bowel habits, dyspepsia, ulcer disease, hematochezia, or melena.  history of colon carcinoma in the remote past    Endocrine: Negative for cold intolerance, heat intolerance, polydipsia, polyphagia and polyuria. Denies any history of weight change, heat/cold intolerance, polydipsia, polyuria     Genitourinary: Negative.      Musculoskeletal: DJD    Skin:  Denies any change in hair or nails, rashes, or skin lesions.     Allergic/Immunologic: Negative.  Negative for environmental allergies, food allergies and immunocompromised state.     Neurological:  Denies any history of recurrent headaches, strokes, TIA, or seizure disorder.     Hematological: Denies any food allergies, seasonal allergies, bleeding disorders, or lymphadenopathy.     Psychiatric/Behavioral: Denies any history of depression, substance abuse, or change in cognitive function.         Physical Exam     Constitutional: Cooperative, alert and oriented, well-developed, well-nourished, in no acute distress.     HENT:   Head: Normocephalic, normal hair patterns, no masses or tenderness.  Ears, Nose, and Throat: No gross abnormalities. No pallor or " cyanosis.   Eyes: EOMS intact, PERRL, conjunctivae and lids unremarkable. Fundoscopic exam and visual fields not performed.   Neck: No palpable masses or adenopathy, no thyromegaly, no JVD, carotid pulses are full and equal bilaterally and without  Bruits.     Cardiovascular: Regular rhythm, S1 and S2 normal, no S3 or S4.  No murmurs, gallops, or rubs detected.     Pulmonary/Chest: Chest: normal symmetry,  normal respiratory excursion, no intercostal retraction, no use of accessory muscles.            Pulmonary: Normal breath sounds. No rales or ronchi.    Abdominal: Abdomen soft, bowel sounds normoactive, no masses, no hepatosplenomegaly, non-tender, no bruits.     Musculoskeletal: No deformities, clubbing, cyanosis, erythema, or edema observed.     Neurological: No gross motor or sensory deficits noted, affect appropriate, oriented to time, person, place.     Skin: Warm and dry to the touch, no apparent skin lesions or masses noted.     Psychiatric: He has a normal mood and affect. His behavior is normal. Judgment and thought content normal.         Procedures      Lab Results   Component Value Date    WBC 4.94 04/21/2018    HGB 17.2 04/21/2018    HCT 48.9 04/21/2018    MCV 83.6 04/21/2018     04/21/2018     Lab Results   Component Value Date    GLUCOSE 107 (H) 04/21/2018    BUN 17 04/21/2018    CREATININE 1.13 04/21/2018    EGFRIFNONA 64 04/21/2018    BCR 15.0 04/21/2018    CO2 30.0 04/21/2018    CALCIUM 9.8 04/21/2018    ALBUMIN 4.60 04/21/2018    LABIL2 1.4 04/21/2018    AST 33 04/21/2018    ALT 44 04/21/2018     Lab Results   Component Value Date    CHOL 143 04/21/2018    CHOL 137 03/11/2017     Lab Results   Component Value Date    TRIG 95 04/21/2018    TRIG 95 03/11/2017    TRIG 107 05/28/2016     Lab Results   Component Value Date    HDL 40 (L) 04/21/2018    HDL 43 (L) 03/11/2017    HDL 37 (L) 05/28/2016     No components found for: LDLCALC  Lab Results   Component Value Date    LDL 68 04/21/2018     LDL 67 03/11/2017    LDL 97 05/28/2016     No results found for: HDLLDLRATIO  No components found for: CHOLHDL  Lab Results   Component Value Date    HGBA1C 6.0 (H) 04/21/2018     Lab Results   Component Value Date    TSH 3.360 04/21/2018           ASSESSMENT AND PLAN    Mr. Sands has presented with multiple cardiac symptomatology including chest pain, palpitation, history of syncopal episode in the background of multiple risk factors for coronary artery disease including hypertension, diabetes mellitus, age.  His chest pain does have atypical features and to further evaluate his symptoms I believe that a CT angiogram of the coronary arteries would be appropriate.  He has mildly dilated ascending aorta noted in the past and this can be reassessed.  His stress test has been false positive in the past.  An echocardiogram to assess left ventricular and valvular function has been arranged.  I've continued his present medications including lipid-lowering therapy with pravastatin, antihypertensive therapy with valsartan HCTZ, amlodipine and I have started him on metoprolol succinate 25 mg daily.  Further recommendations will follow.  Thank you for asking me to see this patient.    Henrik was seen today for chest pain and shortness of breath.    Diagnoses and all orders for this visit:    Essential hypertension  -     CT Angiogram Coronary; Future  -     Adult Transthoracic Echo Complete W/ Cont if Necessary Per Protocol; Future    Mixed hyperlipidemia  -     CT Angiogram Coronary; Future  -     Adult Transthoracic Echo Complete W/ Cont if Necessary Per Protocol; Future    Chest pain, unspecified type  -     Holter Monitor - 72 Hour Up To 21 Days; Future  -     CT Angiogram Coronary; Future  -     Adult Transthoracic Echo Complete W/ Cont if Necessary Per Protocol; Future    Palpitation  -     Holter Monitor - 72 Hour Up To 21 Days; Future  -     CT Angiogram Coronary; Future  -     Adult Transthoracic Echo Complete W/  Cont if Necessary Per Protocol; Future    Vasovagal syncope  -     Holter Monitor - 72 Hour Up To 21 Days; Future  -     CT Angiogram Coronary; Future  -     Adult Transthoracic Echo Complete W/ Cont if Necessary Per Protocol; Future    Other orders  -     metoprolol succinate XL (TOPROL-XL) 25 MG 24 hr tablet; Take 1 tablet by mouth Daily.        Erik So MD  6/12/2018  1:39 PM

## 2018-06-21 ENCOUNTER — HOSPITAL ENCOUNTER (OUTPATIENT)
Dept: INTERVENTIONAL RADIOLOGY/VASCULAR | Facility: HOSPITAL | Age: 68
Discharge: HOME | End: 2018-06-21

## 2018-06-21 ENCOUNTER — LAB (OUTPATIENT)
Dept: LAB | Facility: HOSPITAL | Age: 68
End: 2018-06-21
Attending: INTERNAL MEDICINE

## 2018-06-21 ENCOUNTER — HOSPITAL ENCOUNTER (OUTPATIENT)
Dept: ULTRASOUND IMAGING | Facility: HOSPITAL | Age: 68
Discharge: HOME | End: 2018-06-21

## 2018-06-21 ENCOUNTER — HOSPITAL ENCOUNTER (OUTPATIENT)
Dept: CT IMAGING | Facility: HOSPITAL | Age: 68
Discharge: HOME | End: 2018-06-21
Admitting: INTERNAL MEDICINE

## 2018-06-21 DIAGNOSIS — R55 VASOVAGAL SYNCOPE: ICD-10-CM

## 2018-06-21 DIAGNOSIS — R07.9 CHEST PAIN, UNSPECIFIED TYPE: ICD-10-CM

## 2018-06-21 DIAGNOSIS — I10 ESSENTIAL HYPERTENSION: ICD-10-CM

## 2018-06-21 DIAGNOSIS — E78.2 MIXED HYPERLIPIDEMIA: ICD-10-CM

## 2018-06-21 DIAGNOSIS — R00.2 PALPITATION: ICD-10-CM

## 2018-06-21 LAB
ALBUMIN SERPL-MCNC: 4.6 G/DL (ref 3.4–4.8)
ALBUMIN/GLOB SERPL: 1.3 G/DL (ref 1.1–1.8)
ALP SERPL-CCNC: 84 U/L (ref 38–126)
ALT SERPL W P-5'-P-CCNC: 32 U/L (ref 21–72)
ANION GAP SERPL CALCULATED.3IONS-SCNC: 14 MMOL/L (ref 5–15)
AST SERPL-CCNC: 27 U/L (ref 17–59)
BILIRUB SERPL-MCNC: 0.7 MG/DL (ref 0.2–1.3)
BUN BLD-MCNC: 16 MG/DL (ref 7–21)
BUN/CREAT SERPL: 13.7 (ref 7–25)
CALCIUM SPEC-SCNC: 9.2 MG/DL (ref 8.4–10.2)
CHLORIDE SERPL-SCNC: 99 MMOL/L (ref 95–110)
CO2 SERPL-SCNC: 27 MMOL/L (ref 22–31)
CREAT BLD-MCNC: 1.17 MG/DL (ref 0.7–1.3)
GFR SERPL CREATININE-BSD FRML MDRD: 62 ML/MIN/1.73 (ref 42–98)
GLOBULIN UR ELPH-MCNC: 3.6 GM/DL (ref 2.3–3.5)
GLUCOSE BLD-MCNC: 90 MG/DL (ref 60–100)
POTASSIUM BLD-SCNC: 3.9 MMOL/L (ref 3.5–5.1)
PROT SERPL-MCNC: 8.2 G/DL (ref 6.3–8.6)
SODIUM BLD-SCNC: 140 MMOL/L (ref 137–145)

## 2018-06-21 PROCEDURE — 36415 COLL VENOUS BLD VENIPUNCTURE: CPT

## 2018-06-21 PROCEDURE — 0 IOPAMIDOL PER 1 ML: Performed by: INTERNAL MEDICINE

## 2018-06-21 PROCEDURE — 80053 COMPREHEN METABOLIC PANEL: CPT

## 2018-06-21 PROCEDURE — 76937 US GUIDE VASCULAR ACCESS: CPT

## 2018-06-21 PROCEDURE — C1751 CATH, INF, PER/CENT/MIDLINE: HCPCS

## 2018-06-21 PROCEDURE — 75574 CT ANGIO HRT W/3D IMAGE: CPT

## 2018-06-21 RX ADMIN — IOPAMIDOL 90 ML: 755 INJECTION, SOLUTION INTRAVENOUS at 15:23

## 2018-06-21 NOTE — PROGRESS NOTES
TWO PATIENT IDENTIFIERS WERE USED. THE PATIENT WAS DRAPED WITH A FULL BODY DRAPE AND THE PATIENT'S RIGHT ARM WAS PREPPED WITH CHLORA PREP. ULTRASOUND WAS USED TO LOCALIZE THERIGHT BASILIC VEIN. SUBCUTANEOUS TISSUE AT THE CATHETER SITE WAS INFILTRATED WITH 2% LIDOCAINE. UNDER ULTRASOUND GUIDANCE, THE VEIN WAS ACCESSED WITH A 21 GAUGE  NEEDLE. AN 0.018 WIRE WAS THEN THREADED THROUGH THE NEEDLE. THE 21 GAUGE NEEDLE WAS REMOVED AND A 4 Syriac SHEATH WAS PLACED OVER THE WIRE INTO THE VEIN.THE MIDLINE CATHETER WAS TRIMMED TO 12CM. THE MIDLINE CATHETER WAS THEN PLACED OVER THE WIRE INTO THE VEIN, THE SHEATH WAS PEELED AWAY, WIRE WAS REMOVED. CATHETER WAS FLUSHED WITH NORMAL SALINE AND CATHETER TIP APPLIED. BIOPATCH PLACED. CATHETER SECURED WITH STAT LOCK AND TEGADERM. PATIENT TOLERATED PROCEDURE WELL. THIS WAS DONE CT ROOM      IMPRESSION:SUCCESSFUL PLACEMENT OF SINGLE LUMEN MIDLINE.           Krystal Man RN  6/21/2018  3:20 PM

## 2018-06-22 ENCOUNTER — TELEPHONE (OUTPATIENT)
Dept: CARDIOLOGY | Facility: CLINIC | Age: 68
End: 2018-06-22

## 2018-06-22 NOTE — TELEPHONE ENCOUNTER
Spoke with pt wife regarding CTA results.  Instructed pt to wear Zio for one week and mail back to company per MD instruction.  Pt will also have appointment moved up.

## 2018-07-10 ENCOUNTER — PREP FOR SURGERY (OUTPATIENT)
Dept: OTHER | Facility: HOSPITAL | Age: 68
End: 2018-07-10

## 2018-07-10 ENCOUNTER — DOCUMENTATION (OUTPATIENT)
Dept: CARDIOLOGY | Facility: CLINIC | Age: 68
End: 2018-07-10

## 2018-07-10 ENCOUNTER — OFFICE VISIT (OUTPATIENT)
Dept: CARDIOLOGY | Facility: CLINIC | Age: 68
End: 2018-07-10

## 2018-07-10 VITALS
HEART RATE: 69 BPM | BODY MASS INDEX: 32.5 KG/M2 | OXYGEN SATURATION: 97 % | SYSTOLIC BLOOD PRESSURE: 124 MMHG | DIASTOLIC BLOOD PRESSURE: 60 MMHG | HEIGHT: 70 IN | WEIGHT: 227 LBS

## 2018-07-10 DIAGNOSIS — E78.2 MIXED HYPERLIPIDEMIA: ICD-10-CM

## 2018-07-10 DIAGNOSIS — R06.09 DYSPNEA ON EXERTION: ICD-10-CM

## 2018-07-10 DIAGNOSIS — R00.2 PALPITATION: ICD-10-CM

## 2018-07-10 DIAGNOSIS — R07.9 CHEST PAIN, UNSPECIFIED TYPE: Primary | ICD-10-CM

## 2018-07-10 DIAGNOSIS — I20.9 ANGINA PECTORIS (HCC): Primary | ICD-10-CM

## 2018-07-10 PROCEDURE — 99214 OFFICE O/P EST MOD 30 MIN: CPT | Performed by: INTERNAL MEDICINE

## 2018-07-10 RX ORDER — SODIUM CHLORIDE 9 MG/ML
100 INJECTION, SOLUTION INTRAVENOUS CONTINUOUS
Status: CANCELLED | OUTPATIENT
Start: 2018-07-19 | End: 2018-07-19

## 2018-07-10 RX ORDER — ATENOLOL 25 MG/1
25 TABLET ORAL DAILY
COMMUNITY
End: 2018-07-10

## 2018-07-10 RX ORDER — NITROGLYCERIN 0.4 MG/1
TABLET SUBLINGUAL
Qty: 25 TABLET | Refills: 11 | Status: SHIPPED | OUTPATIENT
Start: 2018-07-10 | End: 2018-07-19 | Stop reason: HOSPADM

## 2018-07-10 RX ORDER — SODIUM CHLORIDE 0.9 % (FLUSH) 0.9 %
1-10 SYRINGE (ML) INJECTION AS NEEDED
Status: CANCELLED | OUTPATIENT
Start: 2018-07-19

## 2018-07-10 NOTE — PROGRESS NOTES
Henrik Sands  68 y.o. male    07/10/2018  1. Chest pain, unspecified type    2. Dyspnea on exertion    3. Mixed hyperlipidemia    4. Palpitation        History of Present Illness  Mr. Sands presented with multiple cardiac symptomatology including chest pain, palpitation, history of syncopal episode in the background of multiple risk factors for coronary artery disease including hypertension, diabetes mellitus, age. Further workup included a CT angiogram of the coronary arteries which showed:  IMPRESSION:  CONCLUSION: Abnormal CT coronary arteriogram.  1. Extensive calcified plaque proximal and mid LAD. There is at  least one focal area of significant stenosis of between 70-75% in  the more distal LAD.  2. Extensive plaque throughout the right coronary artery. The  right coronary artery is chronically occluded at its origin with  some faint visualization of contrast in the distal right coronary  artery and PDA apparently via collaterals.  3. Circumflex/OM1 are normal.  Calcium score 297. Moderate risk for coronary artery disease.  Normal functional analysis. Computer-assisted calculation of left  ventricular ejection fraction 69%.    Echocardiogram showed:  · Left ventricular systolic function is normal. Estimated EF = 56%.  · Left ventricular diastolic dysfunction (grade I) consistent with impaired relaxation.    Holter recording showing sinus rhythm with first-degree AV block.  One short nonsustained run of most likely supraventricular rhythm with aberrancy.  No symptoms are reported by the patient.    The patient continues to have intermittent chest pain which is described as sharp and pressure-like and lasting for a few seconds to minutes.  He has been compliant with his medications.  No dizziness or syncope is reported.  He has occasional palpitations.    SUBJECTIVE    No Known Allergies      Past Medical History:   Diagnosis Date   • Abdominal bloating    • After-cataract with vision obscured     left    • Allergic rhinitis    • Artificial lens present     both   • Asthma     cough variant   • Asthma     IgE-mediated allergic asthma      • Colonic polyp     Polyp of large intestine - multiple      • Cranial nerve disorder, unspecified      right 3rd x 10days to 2wks      • Diabetes mellitus (CMS/HCC)    • Disorder of skin     lesion to right forehead      • Essential hypertension     which may be renovascular in origin      • MEL (generalized anxiety disorder)    • Generalized colicky abdominal pain    • History of colonic polyps    • History of echocardiogram 04/25/2008    normal systolic function,minimal eft atrial enlargement,aortic root upper limits of normal   • History of malignant neoplasm of colon    • Hyperlipidemia    • Hypokalemia     persistent   • Ischemic optic neuropathy of both eyes    • Kidney stone     with hyperoxaluria now for 1st time      • Long term use of drug    • Malaise and fatigue    • Neoplasm of uncertain behavior of skin    • 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   • Osteoarthritis    • Pain in lower limb    • Primary malignant neoplasm of colon (CMS/HCC)     metastatic,without evidence of recurrent metastase      • Pseudophakia    • Renal impairment     stage 1   • Stasis edema    • Type 2 diabetes mellitus (CMS/HCC)     no BDR   • Vitamin D deficiency     on treatment         Past Surgical History:   Procedure Laterality Date   • CARDIAC CATHETERIZATION  04/25/2008    selective coronary angiography,right iliofemoral,mild nonobstructive CAD,potential for dilated ascending aorta given the necessity of using a JL5   • CATARACT EXTRACTION  06/18/2014    AFTER CATARACT LASER SURGERY 25445 (1)      • CATARACT EXTRACTION W/ INTRAOCULAR LENS IMPLANT Left 12/19/2006   • COLECTOMY PARTIAL / TOTAL  01/31/2007    low anterior resection of the colon with stapled colorectal anastomosis.Middle rectal cancer w/suspected sigmoid serosal implants at the rectosigmoid junction   • COLONOSCOPY W/ POLYPECTOMY   02/11/2016    Patent end-to-end colo-colonic anastomosis.Melanosis in the colon.One 2 mm polyp at 85 cm prox.to anus.Resected and retrieved.One 5 mm polyp at 80 cm prox to anus.Resected and retrieved.One 1 mm polyp at 40 cm prox. to anus.Resected and retrieved.   • EXCISION LESION  11/06/2001    Electrodesiccation and curettage x 3, right upper back   • EXTRACORPOREAL SHOCK WAVE LITHOTRIPSY (ESWL)  08/24/2005    right extracorporeal shock wave 1000 shocks at an energy level 9.Bilateral ureteral calculus,right one symptomatic.Stone 5 x7   • MEDIPORT INSERTION, SINGLE  04/11/2007    left subclavin Mediport,metastatic colon cancer         No family history on file.      Social History     Social History   • Marital status:      Spouse name: N/A   • Number of children: N/A   • Years of education: N/A     Occupational History   • Not on file.     Social History Main Topics   • Smoking status: Never Smoker   • Smokeless tobacco: Not on file   • Alcohol use No   • Drug use: Unknown   • Sexual activity: Not on file     Other Topics Concern   • Not on file     Social History Narrative   • No narrative on file         Current Outpatient Prescriptions   Medication Sig Dispense Refill   • albuterol (PROVENTIL HFA;VENTOLIN HFA) 108 (90 Base) MCG/ACT inhaler Inhale 2 puffs Every 4 (Four) Hours As Needed for Wheezing. 18 g 11   • allopurinol (ZYLOPRIM) 100 MG tablet Take 1 tablet by mouth 2 (Two) Times a Day for gout. 60 tablet 5   • amLODIPine (NORVASC) 10 MG tablet Take 1 tablet by mouth Daily. 90 tablet 0   • atenolol (TENORMIN) 25 MG tablet Take 25 mg by mouth Daily.     • glucose blood (FREESTYLE TEST STRIPS) test strip Use to test blood sugar up to 3 times daily. 100 each 12   • hydroxychloroquine (PLAQUENIL) 200 MG tablet Take 1 tablet by mouth 2 (Two) Times a Day. 180 tablet 3   • Lancets (FREESTYLE) lancets Use to test blood sugar up to 3 times daily. 100 each 12   • metoprolol succinate XL (TOPROL-XL) 25 MG 24 hr  "tablet Take 1 tablet by mouth Daily. 30 tablet 6   • potassium citrate (UROCIT-K) 10 MEQ (1080 MG) CR tablet Take 2 tablets by mouth 4 (Four) Times a Day for hypokalemia. 240 tablet 5   • pravastatin (PRAVACHOL) 40 MG tablet Take 1 tablet by mouth Daily. 90 tablet 0   • raNITIdine (ZANTAC) 150 MG tablet Take 150 mg by mouth 2 (Two) Times a Day.     • valsartan-hydrochlorothiazide (DIOVAN-HCT) 320-25 MG per tablet Take 1 tablet by mouth Daily. 90 tablet 3   • nitroglycerin (NITROSTAT) 0.4 MG SL tablet Dissolve 1 tablet under the tongue as needed for angina. May repeat every 5 minutes for up three doses. 25 tablet 11     No current facility-administered medications for this visit.          OBJECTIVE    /60 (BP Location: Left arm, Patient Position: Sitting)   Pulse 69   Ht 177.8 cm (70\")   Wt 103 kg (227 lb)   SpO2 97%   BMI 32.57 kg/m²         Review of Systems     Constitutional:  Denies recent weight loss, weight gain, fever or chills, no change in exercise tolerance     HENT: hearing loss, No epistaxis, hoarseness, or difficulty speaking.     Eyes: Wears eyeglasses or contact lenses     Respiratory:  Dyspnea with exertion,no cough, wheezing, or hemoptysis.     Cardiovascular: See history of present illness    Gastrointestinal:  Denies change in bowel habits, dyspepsia, ulcer disease, hematochezia, or melena.     Endocrine: Negative for cold intolerance, heat intolerance, polydipsia, polyphagia and polyuria.     Genitourinary: Negative.        Physical Exam     Constitutional: Cooperative, alert and oriented,  in no acute distress.     HENT:   Head: Normocephalic, normal hair patterns, no masses or tenderness.  Ears, Nose, and Throat: No gross abnormalities. No pallor or cyanosis.  Eyes: EOMS intact, PERRL, conjunctivae and lids unremarkable. Fundoscopic exam and visual fields not performed.   Neck: No palpable masses or adenopathy, no thyromegaly, no JVD, carotid pulses are full and equal bilaterally and " without  Bruits.     Cardiovascular: Regular rhythm, S1 and S2 normal, no S3 or S4.  No murmurs, gallops, or rubs detected.     Pulmonary/Chest: Chest: normal symmetry,  normal respiratory excursion, no intercostal retraction, no use of accessory muscles.            Pulmonary: Normal breath sounds. No rales or ronchi.    Abdominal: Abdomen soft, bowel sounds normoactive, no masses, no hepatosplenomegaly, non-tender, no bruits.     Musculoskeletal: No deformities, clubbing, cyanosis, erythema, or edema observed.     Neurological: No gross motor or sensory deficits noted, affect appropriate, oriented to time, person, place.     Skin: Warm and dry to the touch, no apparent skin lesions or masses noted.     Procedures      Lab Results   Component Value Date    WBC 4.94 04/21/2018    HGB 17.2 04/21/2018    HCT 48.9 04/21/2018    MCV 83.6 04/21/2018     04/21/2018     Lab Results   Component Value Date    GLUCOSE 90 06/21/2018    BUN 16 06/21/2018    CREATININE 1.17 06/21/2018    EGFRIFNONA 62 06/21/2018    BCR 13.7 06/21/2018    CO2 27.0 06/21/2018    CALCIUM 9.2 06/21/2018    ALBUMIN 4.60 06/21/2018    LABIL2 1.3 06/21/2018    AST 27 06/21/2018    ALT 32 06/21/2018     Lab Results   Component Value Date    CHOL 143 04/21/2018    CHOL 137 03/11/2017     Lab Results   Component Value Date    TRIG 95 04/21/2018    TRIG 95 03/11/2017    TRIG 107 05/28/2016     Lab Results   Component Value Date    HDL 40 (L) 04/21/2018    HDL 43 (L) 03/11/2017    HDL 37 (L) 05/28/2016     No components found for: LDLCALC  Lab Results   Component Value Date    LDL 68 04/21/2018    LDL 67 03/11/2017    LDL 97 05/28/2016     No results found for: HDLLDLRATIO  No components found for: CHOLHDL  Lab Results   Component Value Date    HGBA1C 6.0 (H) 04/21/2018     Lab Results   Component Value Date    TSH 3.360 04/21/2018           ASSESSMENT AND PLAN  Mr. Sands has multiple cardiac symptomatology and I believe that further evaluation by  cardiac catheterization would be appropriate in view of the abnormal CTA of the coronary arteries which showed significant lesions in the left anterior descending coronary artery and right coronary artery. He has been scheduled for the procedure next week.  All risks and benefits have been explained to him in detail an he will an ASA 2 and Mallampati 2.  I have continued his present medications including amlodipine, atenolol, and valsartan HCTZ.    Henrik was seen today for follow-up.    Diagnoses and all orders for this visit:    Chest pain, unspecified type    Dyspnea on exertion    Mixed hyperlipidemia    Palpitation        Erik So MD  7/10/2018  1:18 PM

## 2018-07-11 RX ORDER — AMLODIPINE BESYLATE 10 MG/1
10 TABLET ORAL DAILY
Qty: 90 TABLET | Refills: 0 | Status: SHIPPED | OUTPATIENT
Start: 2018-07-11 | End: 2018-10-08 | Stop reason: SDUPTHER

## 2018-07-12 PROBLEM — I20.9 ANGINA PECTORIS: Status: ACTIVE | Noted: 2018-07-12

## 2018-07-13 ENCOUNTER — TELEPHONE (OUTPATIENT)
Dept: FAMILY MEDICINE CLINIC | Facility: CLINIC | Age: 68
End: 2018-07-13

## 2018-07-13 RX ORDER — PRAVASTATIN SODIUM 40 MG
40 TABLET ORAL DAILY
Qty: 90 TABLET | Refills: 3 | Status: SHIPPED | OUTPATIENT
Start: 2018-07-13 | End: 2019-03-08 | Stop reason: ALTCHOICE

## 2018-07-19 ENCOUNTER — HOSPITAL ENCOUNTER (OUTPATIENT)
Facility: HOSPITAL | Age: 68
Setting detail: HOSPITAL OUTPATIENT SURGERY
Discharge: HOME | End: 2018-07-19
Attending: INTERNAL MEDICINE | Admitting: INTERNAL MEDICINE

## 2018-07-19 VITALS
OXYGEN SATURATION: 93 % | HEART RATE: 66 BPM | RESPIRATION RATE: 18 BRPM | BODY MASS INDEX: 31.97 KG/M2 | TEMPERATURE: 97.6 F | DIASTOLIC BLOOD PRESSURE: 79 MMHG | HEIGHT: 70 IN | WEIGHT: 223.33 LBS | SYSTOLIC BLOOD PRESSURE: 158 MMHG

## 2018-07-19 DIAGNOSIS — I20.9 ANGINA PECTORIS (HCC): ICD-10-CM

## 2018-07-19 LAB
ALBUMIN SERPL-MCNC: 4.5 G/DL (ref 3.4–4.8)
ALBUMIN/GLOB SERPL: 1.3 G/DL (ref 1.1–1.8)
ALP SERPL-CCNC: 79 U/L (ref 38–126)
ALT SERPL W P-5'-P-CCNC: 30 U/L (ref 21–72)
ANION GAP SERPL CALCULATED.3IONS-SCNC: 11 MMOL/L (ref 5–15)
AST SERPL-CCNC: 31 U/L (ref 17–59)
BILIRUB SERPL-MCNC: 0.8 MG/DL (ref 0.2–1.3)
BUN BLD-MCNC: 17 MG/DL (ref 7–21)
BUN/CREAT SERPL: 15.6 (ref 7–25)
CALCIUM SPEC-SCNC: 9.2 MG/DL (ref 8.4–10.2)
CHLORIDE SERPL-SCNC: 103 MMOL/L (ref 95–110)
CO2 SERPL-SCNC: 26 MMOL/L (ref 22–31)
CREAT BLD-MCNC: 1.09 MG/DL (ref 0.7–1.3)
DEPRECATED RDW RBC AUTO: 42 FL (ref 35.1–43.9)
ERYTHROCYTE [DISTWIDTH] IN BLOOD BY AUTOMATED COUNT: 13.9 % (ref 11.5–14.5)
GFR SERPL CREATININE-BSD FRML MDRD: 67 ML/MIN/1.73 (ref 42–98)
GLOBULIN UR ELPH-MCNC: 3.5 GM/DL (ref 2.3–3.5)
GLUCOSE BLD-MCNC: 110 MG/DL (ref 60–100)
HCT VFR BLD AUTO: 45.6 % (ref 39–49)
HGB BLD-MCNC: 15.9 G/DL (ref 13.7–17.3)
INR PPP: 1.09 (ref 0.8–1.2)
MCH RBC QN AUTO: 29.2 PG (ref 26.5–34)
MCHC RBC AUTO-ENTMCNC: 34.9 G/DL (ref 31.5–36.3)
MCV RBC AUTO: 83.7 FL (ref 80–98)
PLATELET # BLD AUTO: 144 10*3/MM3 (ref 150–450)
PMV BLD AUTO: 9.9 FL (ref 8–12)
POTASSIUM BLD-SCNC: 3.8 MMOL/L (ref 3.5–5.1)
PROT SERPL-MCNC: 8 G/DL (ref 6.3–8.6)
PROTHROMBIN TIME: 13.9 SECONDS (ref 11.1–15.3)
RBC # BLD AUTO: 5.45 10*6/MM3 (ref 4.37–5.74)
SODIUM BLD-SCNC: 140 MMOL/L (ref 137–145)
WBC NRBC COR # BLD: 5.77 10*3/MM3 (ref 3.2–9.8)

## 2018-07-19 PROCEDURE — C1894 INTRO/SHEATH, NON-LASER: HCPCS | Performed by: INTERNAL MEDICINE

## 2018-07-19 PROCEDURE — C1769 GUIDE WIRE: HCPCS | Performed by: INTERNAL MEDICINE

## 2018-07-19 PROCEDURE — 85027 COMPLETE CBC AUTOMATED: CPT | Performed by: INTERNAL MEDICINE

## 2018-07-19 PROCEDURE — 93458 L HRT ARTERY/VENTRICLE ANGIO: CPT | Performed by: INTERNAL MEDICINE

## 2018-07-19 PROCEDURE — 85610 PROTHROMBIN TIME: CPT | Performed by: INTERNAL MEDICINE

## 2018-07-19 PROCEDURE — C1760 CLOSURE DEV, VASC: HCPCS | Performed by: INTERNAL MEDICINE

## 2018-07-19 PROCEDURE — 25010000002 FENTANYL CITRATE (PF) 100 MCG/2ML SOLUTION: Performed by: INTERNAL MEDICINE

## 2018-07-19 PROCEDURE — 0 IOPAMIDOL PER 1 ML: Performed by: INTERNAL MEDICINE

## 2018-07-19 PROCEDURE — 25010000002 MIDAZOLAM PER 1 MG: Performed by: INTERNAL MEDICINE

## 2018-07-19 PROCEDURE — 80053 COMPREHEN METABOLIC PANEL: CPT | Performed by: INTERNAL MEDICINE

## 2018-07-19 RX ORDER — FENTANYL CITRATE 50 UG/ML
INJECTION, SOLUTION INTRAMUSCULAR; INTRAVENOUS AS NEEDED
Status: DISCONTINUED | OUTPATIENT
Start: 2018-07-19 | End: 2018-07-19 | Stop reason: HOSPADM

## 2018-07-19 RX ORDER — MIDAZOLAM HYDROCHLORIDE 1 MG/ML
INJECTION INTRAMUSCULAR; INTRAVENOUS AS NEEDED
Status: DISCONTINUED | OUTPATIENT
Start: 2018-07-19 | End: 2018-07-19 | Stop reason: HOSPADM

## 2018-07-19 RX ORDER — SODIUM CHLORIDE 0.9 % (FLUSH) 0.9 %
1-10 SYRINGE (ML) INJECTION AS NEEDED
Status: DISCONTINUED | OUTPATIENT
Start: 2018-07-19 | End: 2018-07-19 | Stop reason: HOSPADM

## 2018-07-19 RX ORDER — LIDOCAINE HYDROCHLORIDE 20 MG/ML
INJECTION, SOLUTION INFILTRATION; PERINEURAL AS NEEDED
Status: DISCONTINUED | OUTPATIENT
Start: 2018-07-19 | End: 2018-07-19 | Stop reason: HOSPADM

## 2018-07-19 RX ORDER — SODIUM CHLORIDE 9 MG/ML
125 INJECTION, SOLUTION INTRAVENOUS CONTINUOUS
Status: DISCONTINUED | OUTPATIENT
Start: 2018-07-19 | End: 2018-07-19 | Stop reason: HOSPADM

## 2018-07-19 RX ORDER — SODIUM CHLORIDE 9 MG/ML
100 INJECTION, SOLUTION INTRAVENOUS CONTINUOUS
Status: ACTIVE | OUTPATIENT
Start: 2018-07-19 | End: 2018-07-19

## 2018-07-19 RX ADMIN — SODIUM CHLORIDE 100 ML/HR: 9 INJECTION, SOLUTION INTRAVENOUS at 06:36

## 2018-07-19 NOTE — H&P (VIEW-ONLY)
Henrik Sands  68 y.o. male    07/10/2018  1. Chest pain, unspecified type    2. Dyspnea on exertion    3. Mixed hyperlipidemia    4. Palpitation        History of Present Illness  Mr. Sands presented with multiple cardiac symptomatology including chest pain, palpitation, history of syncopal episode in the background of multiple risk factors for coronary artery disease including hypertension, diabetes mellitus, age. Further workup included a CT angiogram of the coronary arteries which showed:  IMPRESSION:  CONCLUSION: Abnormal CT coronary arteriogram.  1. Extensive calcified plaque proximal and mid LAD. There is at  least one focal area of significant stenosis of between 70-75% in  the more distal LAD.  2. Extensive plaque throughout the right coronary artery. The  right coronary artery is chronically occluded at its origin with  some faint visualization of contrast in the distal right coronary  artery and PDA apparently via collaterals.  3. Circumflex/OM1 are normal.  Calcium score 297. Moderate risk for coronary artery disease.  Normal functional analysis. Computer-assisted calculation of left  ventricular ejection fraction 69%.    Echocardiogram showed:  · Left ventricular systolic function is normal. Estimated EF = 56%.  · Left ventricular diastolic dysfunction (grade I) consistent with impaired relaxation.    Holter recording showing sinus rhythm with first-degree AV block.  One short nonsustained run of most likely supraventricular rhythm with aberrancy.  No symptoms are reported by the patient.    The patient continues to have intermittent chest pain which is described as sharp and pressure-like and lasting for a few seconds to minutes.  He has been compliant with his medications.  No dizziness or syncope is reported.  He has occasional palpitations.    SUBJECTIVE    No Known Allergies      Past Medical History:   Diagnosis Date   • Abdominal bloating    • After-cataract with vision obscured     left    • Allergic rhinitis    • Artificial lens present     both   • Asthma     cough variant   • Asthma     IgE-mediated allergic asthma      • Colonic polyp     Polyp of large intestine - multiple      • Cranial nerve disorder, unspecified      right 3rd x 10days to 2wks      • Diabetes mellitus (CMS/HCC)    • Disorder of skin     lesion to right forehead      • Essential hypertension     which may be renovascular in origin      • MEL (generalized anxiety disorder)    • Generalized colicky abdominal pain    • History of colonic polyps    • History of echocardiogram 04/25/2008    normal systolic function,minimal eft atrial enlargement,aortic root upper limits of normal   • History of malignant neoplasm of colon    • Hyperlipidemia    • Hypokalemia     persistent   • Ischemic optic neuropathy of both eyes    • Kidney stone     with hyperoxaluria now for 1st time      • Long term use of drug    • Malaise and fatigue    • Neoplasm of uncertain behavior of skin    • 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   • Osteoarthritis    • Pain in lower limb    • Primary malignant neoplasm of colon (CMS/HCC)     metastatic,without evidence of recurrent metastase      • Pseudophakia    • Renal impairment     stage 1   • Stasis edema    • Type 2 diabetes mellitus (CMS/HCC)     no BDR   • Vitamin D deficiency     on treatment         Past Surgical History:   Procedure Laterality Date   • CARDIAC CATHETERIZATION  04/25/2008    selective coronary angiography,right iliofemoral,mild nonobstructive CAD,potential for dilated ascending aorta given the necessity of using a JL5   • CATARACT EXTRACTION  06/18/2014    AFTER CATARACT LASER SURGERY 75063 (1)      • CATARACT EXTRACTION W/ INTRAOCULAR LENS IMPLANT Left 12/19/2006   • COLECTOMY PARTIAL / TOTAL  01/31/2007    low anterior resection of the colon with stapled colorectal anastomosis.Middle rectal cancer w/suspected sigmoid serosal implants at the rectosigmoid junction   • COLONOSCOPY W/ POLYPECTOMY   02/11/2016    Patent end-to-end colo-colonic anastomosis.Melanosis in the colon.One 2 mm polyp at 85 cm prox.to anus.Resected and retrieved.One 5 mm polyp at 80 cm prox to anus.Resected and retrieved.One 1 mm polyp at 40 cm prox. to anus.Resected and retrieved.   • EXCISION LESION  11/06/2001    Electrodesiccation and curettage x 3, right upper back   • EXTRACORPOREAL SHOCK WAVE LITHOTRIPSY (ESWL)  08/24/2005    right extracorporeal shock wave 1000 shocks at an energy level 9.Bilateral ureteral calculus,right one symptomatic.Stone 5 x7   • MEDIPORT INSERTION, SINGLE  04/11/2007    left subclavin Mediport,metastatic colon cancer         No family history on file.      Social History     Social History   • Marital status:      Spouse name: N/A   • Number of children: N/A   • Years of education: N/A     Occupational History   • Not on file.     Social History Main Topics   • Smoking status: Never Smoker   • Smokeless tobacco: Not on file   • Alcohol use No   • Drug use: Unknown   • Sexual activity: Not on file     Other Topics Concern   • Not on file     Social History Narrative   • No narrative on file         Current Outpatient Prescriptions   Medication Sig Dispense Refill   • albuterol (PROVENTIL HFA;VENTOLIN HFA) 108 (90 Base) MCG/ACT inhaler Inhale 2 puffs Every 4 (Four) Hours As Needed for Wheezing. 18 g 11   • allopurinol (ZYLOPRIM) 100 MG tablet Take 1 tablet by mouth 2 (Two) Times a Day for gout. 60 tablet 5   • amLODIPine (NORVASC) 10 MG tablet Take 1 tablet by mouth Daily. 90 tablet 0   • atenolol (TENORMIN) 25 MG tablet Take 25 mg by mouth Daily.     • glucose blood (FREESTYLE TEST STRIPS) test strip Use to test blood sugar up to 3 times daily. 100 each 12   • hydroxychloroquine (PLAQUENIL) 200 MG tablet Take 1 tablet by mouth 2 (Two) Times a Day. 180 tablet 3   • Lancets (FREESTYLE) lancets Use to test blood sugar up to 3 times daily. 100 each 12   • metoprolol succinate XL (TOPROL-XL) 25 MG 24 hr  "tablet Take 1 tablet by mouth Daily. 30 tablet 6   • potassium citrate (UROCIT-K) 10 MEQ (1080 MG) CR tablet Take 2 tablets by mouth 4 (Four) Times a Day for hypokalemia. 240 tablet 5   • pravastatin (PRAVACHOL) 40 MG tablet Take 1 tablet by mouth Daily. 90 tablet 0   • raNITIdine (ZANTAC) 150 MG tablet Take 150 mg by mouth 2 (Two) Times a Day.     • valsartan-hydrochlorothiazide (DIOVAN-HCT) 320-25 MG per tablet Take 1 tablet by mouth Daily. 90 tablet 3   • nitroglycerin (NITROSTAT) 0.4 MG SL tablet Dissolve 1 tablet under the tongue as needed for angina. May repeat every 5 minutes for up three doses. 25 tablet 11     No current facility-administered medications for this visit.          OBJECTIVE    /60 (BP Location: Left arm, Patient Position: Sitting)   Pulse 69   Ht 177.8 cm (70\")   Wt 103 kg (227 lb)   SpO2 97%   BMI 32.57 kg/m²         Review of Systems     Constitutional:  Denies recent weight loss, weight gain, fever or chills, no change in exercise tolerance     HENT: hearing loss, No epistaxis, hoarseness, or difficulty speaking.     Eyes: Wears eyeglasses or contact lenses     Respiratory:  Dyspnea with exertion,no cough, wheezing, or hemoptysis.     Cardiovascular: See history of present illness    Gastrointestinal:  Denies change in bowel habits, dyspepsia, ulcer disease, hematochezia, or melena.     Endocrine: Negative for cold intolerance, heat intolerance, polydipsia, polyphagia and polyuria.     Genitourinary: Negative.        Physical Exam     Constitutional: Cooperative, alert and oriented,  in no acute distress.     HENT:   Head: Normocephalic, normal hair patterns, no masses or tenderness.  Ears, Nose, and Throat: No gross abnormalities. No pallor or cyanosis.  Eyes: EOMS intact, PERRL, conjunctivae and lids unremarkable. Fundoscopic exam and visual fields not performed.   Neck: No palpable masses or adenopathy, no thyromegaly, no JVD, carotid pulses are full and equal bilaterally and " without  Bruits.     Cardiovascular: Regular rhythm, S1 and S2 normal, no S3 or S4.  No murmurs, gallops, or rubs detected.     Pulmonary/Chest: Chest: normal symmetry,  normal respiratory excursion, no intercostal retraction, no use of accessory muscles.            Pulmonary: Normal breath sounds. No rales or ronchi.    Abdominal: Abdomen soft, bowel sounds normoactive, no masses, no hepatosplenomegaly, non-tender, no bruits.     Musculoskeletal: No deformities, clubbing, cyanosis, erythema, or edema observed.     Neurological: No gross motor or sensory deficits noted, affect appropriate, oriented to time, person, place.     Skin: Warm and dry to the touch, no apparent skin lesions or masses noted.     Procedures      Lab Results   Component Value Date    WBC 4.94 04/21/2018    HGB 17.2 04/21/2018    HCT 48.9 04/21/2018    MCV 83.6 04/21/2018     04/21/2018     Lab Results   Component Value Date    GLUCOSE 90 06/21/2018    BUN 16 06/21/2018    CREATININE 1.17 06/21/2018    EGFRIFNONA 62 06/21/2018    BCR 13.7 06/21/2018    CO2 27.0 06/21/2018    CALCIUM 9.2 06/21/2018    ALBUMIN 4.60 06/21/2018    LABIL2 1.3 06/21/2018    AST 27 06/21/2018    ALT 32 06/21/2018     Lab Results   Component Value Date    CHOL 143 04/21/2018    CHOL 137 03/11/2017     Lab Results   Component Value Date    TRIG 95 04/21/2018    TRIG 95 03/11/2017    TRIG 107 05/28/2016     Lab Results   Component Value Date    HDL 40 (L) 04/21/2018    HDL 43 (L) 03/11/2017    HDL 37 (L) 05/28/2016     No components found for: LDLCALC  Lab Results   Component Value Date    LDL 68 04/21/2018    LDL 67 03/11/2017    LDL 97 05/28/2016     No results found for: HDLLDLRATIO  No components found for: CHOLHDL  Lab Results   Component Value Date    HGBA1C 6.0 (H) 04/21/2018     Lab Results   Component Value Date    TSH 3.360 04/21/2018           ASSESSMENT AND PLAN  Mr. Sands has multiple cardiac symptomatology and I believe that further evaluation by  cardiac catheterization would be appropriate in view of the abnormal CTA of the coronary arteries which showed significant lesions in the left anterior descending coronary artery and right coronary artery. He has been scheduled for the procedure next week.  All risks and benefits have been explained to him in detail an he will an ASA 2 and Mallampati 2.  I have continued his present medications including amlodipine, atenolol, and valsartan HCTZ.    Henrik was seen today for follow-up.    Diagnoses and all orders for this visit:    Chest pain, unspecified type    Dyspnea on exertion    Mixed hyperlipidemia    Palpitation        Erik So MD  7/10/2018  1:18 PM

## 2018-08-08 RX ORDER — ALLOPURINOL 100 MG/1
100 TABLET ORAL
Qty: 60 TABLET | Refills: 5 | Status: SHIPPED | OUTPATIENT
Start: 2018-08-08 | End: 2019-02-06

## 2018-09-18 ENCOUNTER — OFFICE VISIT (OUTPATIENT)
Dept: CARDIOLOGY | Facility: CLINIC | Age: 68
End: 2018-09-18

## 2018-09-18 VITALS
SYSTOLIC BLOOD PRESSURE: 118 MMHG | HEART RATE: 65 BPM | BODY MASS INDEX: 31.92 KG/M2 | DIASTOLIC BLOOD PRESSURE: 70 MMHG | WEIGHT: 223 LBS | HEIGHT: 70 IN | OXYGEN SATURATION: 95 %

## 2018-09-18 DIAGNOSIS — R55 VASOVAGAL SYNCOPE: Primary | ICD-10-CM

## 2018-09-18 DIAGNOSIS — R07.9 CHEST PAIN, UNSPECIFIED TYPE: ICD-10-CM

## 2018-09-18 DIAGNOSIS — R06.09 DYSPNEA ON EXERTION: ICD-10-CM

## 2018-09-18 PROCEDURE — 99214 OFFICE O/P EST MOD 30 MIN: CPT | Performed by: INTERNAL MEDICINE

## 2018-09-18 NOTE — PROGRESS NOTES
Henrik Sands  68 y.o. male    09/18/2018  1. Vasovagal syncope    2. Dyspnea on exertion    3. Chest pain, unspecified type        History of Present Illness  Mr. Sands presented with multiple cardiac symptomatology including chest pain, palpitation, history of syncopal episode in the background of multiple risk factors for coronary artery disease including hypertension, diabetes mellitus, age.  He underwent a CT angiogram of the coronary arteries which raised the question of significant CAD.  Hence cardiac catheterization was recommended.  This showed:  Left main coronary artery: This was a patent vessel with minor plaques which divided into a left anterior descending coronary artery, ramus intermedius coronary artery and left circumflex coronary artery  Left anterior descending coronary artery: This was a medium-sized vessel with minor luminal irregularities and calcification in the proximal segment, mild ectatic segment in the mid Left Anterior Descending Coronary Artery with minor calcification and at the point of origin of diagonal 2 there was an eccentric noncritical disease up to 40%.  The mid and distal LAD was a small caliber vessel.  Diagonal 1 was a patent vessel.  No flow-limiting lesions noted in the LAD  Ramus intermedius coronary artery: This was a medium size vessel which was patent  Left circumflex coronary artery : This was a medium-sized tortuous vessel which was patent with minor luminal irregularities  Right Coronary Artery: This was a dominant vessel with minor plaques and calcification the proximal and midsegment with no flow-limiting lesions.  Noncritical disease up to 20-30% was noted in the mid RCA and distal RCA had minor plaques.  PDA and PLV branches were patent.  Left ventriculogram: Left ventricular systolic function was normal with an ejection fraction of 55% with no wall motion abnormalities.  Aortic pressure was 110/55 and left ventricular pressure was 118/6  mmHg.  Impression: Normal left ventricular systolic function with no wall motion abnormalities.  Estimated ejection fraction was 55%.  Noncritical lesions noted in the coronary arteries as described above.  Chest pain most likely noncardiac.       Patient was continued to have intermittent chest pains which seem to occur at different times unrelated to exertion.  He has been compliant with his medications.  His blood pressure has been the normal range.  He has not had any syncopal episodes.  Event monitor did not show any significant sustained arrhythmias.    SUBJECTIVE    Allergies   Allergen Reactions   • Latex Rash         Past Medical History:   Diagnosis Date   • Abdominal bloating    • After-cataract with vision obscured     left   • Allergic rhinitis    • Artificial lens present     both   • Asthma     cough variant   • Asthma     IgE-mediated allergic asthma      • Colonic polyp     Polyp of large intestine - multiple      • Cranial nerve disorder, unspecified      right 3rd x 10days to 2wks      • Diabetes mellitus (CMS/HCC)    • Disorder of skin     lesion to right forehead      • Essential hypertension     which may be renovascular in origin      • MEL (generalized anxiety disorder)    • Generalized colicky abdominal pain    • History of colonic polyps    • History of echocardiogram 04/25/2008    normal systolic function,minimal eft atrial enlargement,aortic root upper limits of normal   • History of malignant neoplasm of colon    • Hyperlipidemia    • Hypokalemia     persistent   • Ischemic optic neuropathy of both eyes    • Kidney stone     with hyperoxaluria now for 1st time      • Long term use of drug    • Malaise and fatigue    • Neoplasm of uncertain behavior of skin    • 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   • Osteoarthritis    • Pain in lower limb    • Primary malignant neoplasm of colon (CMS/HCC)     metastatic,without evidence of recurrent metastase      • Pseudophakia    • Renal impairment     stage 1   •  Stasis edema    • Type 2 diabetes mellitus (CMS/HCC)     no BDR   • Vitamin D deficiency     on treatment         Past Surgical History:   Procedure Laterality Date   • CARDIAC CATHETERIZATION  04/25/2008    selective coronary angiography,right iliofemoral,mild nonobstructive CAD,potential for dilated ascending aorta given the necessity of using a JL5   • CARDIAC CATHETERIZATION N/A 7/19/2018    Procedure: Left Heart Cath/ PCI if indicated;  Surgeon: Erik So MD;  Location: [facility] INVASIVE LOCATION;  Service: Cardiovascular   • CATARACT EXTRACTION  06/18/2014    AFTER CATARACT LASER SURGERY 77678 (1)      • CATARACT EXTRACTION W/ INTRAOCULAR LENS IMPLANT Left 12/19/2006   • COLECTOMY PARTIAL / TOTAL  01/31/2007    low anterior resection of the colon with stapled colorectal anastomosis.Middle rectal cancer w/suspected sigmoid serosal implants at the rectosigmoid junction   • COLONOSCOPY W/ POLYPECTOMY  02/11/2016    Patent end-to-end colo-colonic anastomosis.Melanosis in the colon.One 2 mm polyp at 85 cm prox.to anus.Resected and retrieved.One 5 mm polyp at 80 cm prox to anus.Resected and retrieved.One 1 mm polyp at 40 cm prox. to anus.Resected and retrieved.   • EXCISION LESION  11/06/2001    Electrodesiccation and curettage x 3, right upper back   • EXTRACORPOREAL SHOCK WAVE LITHOTRIPSY (ESWL)  08/24/2005    right extracorporeal shock wave 1000 shocks at an energy level 9.Bilateral ureteral calculus,right one symptomatic.Stone 5 x7   • MEDIPORT INSERTION, SINGLE  04/11/2007    left subclavin Mediport,metastatic colon cancer         No family history on file.      Social History     Social History   • Marital status:      Spouse name: N/A   • Number of children: N/A   • Years of education: N/A     Occupational History   • Not on file.     Social History Main Topics   • Smoking status: Never Smoker   • Smokeless tobacco: Not on file   • Alcohol use No   • Drug use: Unknown   • Sexual  "activity: Not on file     Other Topics Concern   • Not on file     Social History Narrative   • No narrative on file         Current Outpatient Prescriptions   Medication Sig Dispense Refill   • albuterol (PROVENTIL HFA;VENTOLIN HFA) 108 (90 Base) MCG/ACT inhaler Inhale 2 puffs Every 4 (Four) Hours As Needed for Wheezing. 18 g 11   • allopurinol (ZYLOPRIM) 100 MG tablet Take 1 tablet by mouth 2 (Two) Times a Day for gout. 60 tablet 5   • amLODIPine (NORVASC) 10 MG tablet Take 1 tablet by mouth Daily. 90 tablet 0   • glucose blood (FREESTYLE TEST STRIPS) test strip Use to test blood sugar up to 3 times daily. 100 each 12   • hydroxychloroquine (PLAQUENIL) 200 MG tablet Take 1 tablet by mouth 2 (Two) Times a Day. 180 tablet 3   • Lancets (FREESTYLE) lancets Use to test blood sugar up to 3 times daily. 100 each 12   • metoprolol succinate XL (TOPROL-XL) 25 MG 24 hr tablet Take 1 tablet by mouth Daily. 30 tablet 6   • potassium citrate (UROCIT-K) 10 MEQ (1080 MG) CR tablet Take 2 tablets by mouth 4 (Four) Times a Day for hypokalemia. 240 tablet 5   • pravastatin (PRAVACHOL) 40 MG tablet Take 1 tablet by mouth Daily. 90 tablet 3   • raNITIdine (ZANTAC) 150 MG tablet Take 150 mg by mouth 2 (Two) Times a Day.     • valsartan-hydrochlorothiazide (DIOVAN-HCT) 320-25 MG per tablet Take 1 tablet by mouth Daily. 90 tablet 3     No current facility-administered medications for this visit.          OBJECTIVE    /70   Pulse 65   Ht 177.8 cm (70\")   Wt 101 kg (223 lb)   SpO2 95%   BMI 32.00 kg/m²         Review of Systems     Constitutional:  Denies recent weight loss, weight gain, fever or chills     HENT:  Hearing loss, No epistaxis, hoarseness, or difficulty speaking.     Eyes: Wears eyeglasses or contact lenses     Respiratory:  Denies dyspnea with exertion,no cough, wheezing, or hemoptysis.     Cardiovascular: See history of present illness    Gastrointestinal:  Denies change in bowel habits, dyspepsia, ulcer " disease, hematochezia, or melena.     Endocrine: Negative for cold intolerance, heat intolerance, polydipsia, polyphagia and polyuria.     Genitourinary: Negative.      Musculoskeletal: Denies any history of arthritic symptoms or back problems     Skin:  Denies any change in hair or nails, rashes, or skin lesions.     Neurological:  Denies any history of recurrent headaches, strokes, TIA, or seizure disorder.     Hematological: Denies any food allergies, seasonal allergies, bleeding disorders, or lymphadenopathy.     Psychiatric/Behavioral: Denies any history of depression, substance abuse, or change in cognitive function.         Physical Exam     Constitutional: Cooperative, alert and oriented,  in no acute distress.     HENT:   Head: Normocephalic, normal hair patterns, no masses or tenderness.  Ears, Nose, and Throat: No gross abnormalities. No pallor or cyanosis.   Eyes: EOMS intact, PERRL, conjunctivae and lids unremarkable. Fundoscopic exam and visual fields not performed.   Neck: No palpable masses or adenopathy, no thyromegaly, no JVD, carotid pulses are full and equal bilaterally and without  Bruits.     Cardiovascular: Regular rhythm, S1 and S2 normal, no S3 or S4.  No murmurs, gallops, or rubs detected.     Pulmonary/Chest: Chest: normal symmetry,  normal respiratory excursion, no intercostal retraction, no use of accessory muscles.            Pulmonary: Normal breath sounds. No rales or ronchi.    Abdominal: Abdomen soft, bowel sounds normoactive, no masses, no hepatosplenomegaly, non-tender, no bruits.     Musculoskeletal: No deformities, clubbing, cyanosis, erythema, or edema observed.     Neurological: No gross motor or sensory deficits noted, affect appropriate, oriented to time, person, place.     Skin: Warm and dry to the touch, no apparent skin lesions or masses noted.     Psychiatric: He has a normal mood and affect. His behavior is normal. Judgment and thought content normal.          Procedures      Lab Results   Component Value Date    WBC 5.77 07/19/2018    HGB 15.9 07/19/2018    HCT 45.6 07/19/2018    MCV 83.7 07/19/2018     (L) 07/19/2018     Lab Results   Component Value Date    GLUCOSE 110 (H) 07/19/2018    BUN 17 07/19/2018    CREATININE 1.09 07/19/2018    EGFRIFNONA 67 07/19/2018    BCR 15.6 07/19/2018    CO2 26.0 07/19/2018    CALCIUM 9.2 07/19/2018    ALBUMIN 4.50 07/19/2018    AST 31 07/19/2018    ALT 30 07/19/2018     Lab Results   Component Value Date    CHOL 143 04/21/2018    CHOL 137 03/11/2017     Lab Results   Component Value Date    TRIG 95 04/21/2018    TRIG 95 03/11/2017    TRIG 107 05/28/2016     Lab Results   Component Value Date    HDL 40 (L) 04/21/2018    HDL 43 (L) 03/11/2017    HDL 37 (L) 05/28/2016     No components found for: LDLCALC  Lab Results   Component Value Date    LDL 68 04/21/2018    LDL 67 03/11/2017    LDL 97 05/28/2016     No results found for: HDLLDLRATIO  No components found for: CHOLHDL  Lab Results   Component Value Date    HGBA1C 6.0 (H) 04/21/2018     Lab Results   Component Value Date    TSH 3.360 04/21/2018           ASSESSMENT AND PLAN  Mr. Sands continues to have intermittent symptoms of chest pain the exact etiology of which isn't clear.  He does not have significant epicardial CAD by cardiac catheterization.  Noncardiac causes likely.  Intermittent muscle cramps cannot be ruled out.  I have advised him to go off Pravachol for a couple weeks to see if this makes a difference.  If not, he can go back on the medication.  I've continued antihypertensive therapy with amlodipine, valsartan HCTZ and metoprolol succinate.  No further cardiac workup is indicated at this time.    Henrik was seen today for follow-up.    Diagnoses and all orders for this visit:    Vasovagal syncope    Dyspnea on exertion    Chest pain, unspecified type        Erik So MD  9/18/2018  9:43 AM

## 2018-10-08 ENCOUNTER — TELEPHONE (OUTPATIENT)
Dept: FAMILY MEDICINE CLINIC | Facility: CLINIC | Age: 68
End: 2018-10-08

## 2018-10-08 RX ORDER — AMLODIPINE BESYLATE 10 MG/1
10 TABLET ORAL DAILY
Qty: 90 TABLET | Refills: 1 | Status: SHIPPED | OUTPATIENT
Start: 2018-10-08 | End: 2019-01-09

## 2018-10-08 NOTE — TELEPHONE ENCOUNTER
CLARA DOMINIQUE IS NEEDING A REFILL ON AMLODIPINE TO BE SENT TO [facility]..  QUESTIONS CALL [phone]  THANKS

## 2018-11-15 ENCOUNTER — TELEPHONE (OUTPATIENT)
Dept: FAMILY MEDICINE CLINIC | Facility: CLINIC | Age: 68
End: 2018-11-15

## 2018-11-15 NOTE — TELEPHONE ENCOUNTER
CLARA DOMINIQUE IS NEEDING REFILL ON THE FREE STYLE TEST STRIPS TO BE SENT TO [facility] PLEASE

## 2018-11-19 ENCOUNTER — TELEPHONE (OUTPATIENT)
Dept: FAMILY MEDICINE CLINIC | Facility: CLINIC | Age: 68
End: 2018-11-19

## 2018-11-19 RX ORDER — LANCETS 28 GAUGE
EACH MISCELLANEOUS
Qty: 100 EACH | Refills: 12 | Status: SHIPPED | OUTPATIENT
Start: 2018-11-19 | End: 2021-04-15 | Stop reason: SDUPTHER

## 2019-01-02 ENCOUNTER — TELEPHONE (OUTPATIENT)
Dept: FAMILY MEDICINE CLINIC | Facility: CLINIC | Age: 69
End: 2019-01-02

## 2019-01-02 RX ORDER — METOPROLOL SUCCINATE 25 MG/1
25 TABLET, EXTENDED RELEASE ORAL DAILY
Qty: 30 TABLET | Refills: 11 | Status: SHIPPED | OUTPATIENT
Start: 2019-01-02 | End: 2019-11-29

## 2019-01-02 NOTE — TELEPHONE ENCOUNTER
CLARA DOMINIQUE IS NEEDING REFILL ON THE METOPROLOL TO BE SENT TO  [facility]...CALL KASSIE EXT [phone] IF PROB

## 2019-01-09 RX ORDER — POTASSIUM CITRATE 10 MEQ/1
20 TABLET, EXTENDED RELEASE ORAL 4 TIMES DAILY
Qty: 240 TABLET | Refills: 1 | Status: SHIPPED | OUTPATIENT
Start: 2019-01-09 | End: 2019-04-10

## 2019-01-09 RX ORDER — VALSARTAN AND HYDROCHLOROTHIAZIDE 320; 25 MG/1; MG/1
1 TABLET, FILM COATED ORAL DAILY
Qty: 90 TABLET | Refills: 1 | Status: SHIPPED | OUTPATIENT
Start: 2019-01-09 | End: 2019-07-17

## 2019-01-09 RX ORDER — AMLODIPINE BESYLATE 10 MG/1
10 TABLET ORAL DAILY
Qty: 90 TABLET | Refills: 1 | Status: SHIPPED | OUTPATIENT
Start: 2019-01-09 | End: 2019-07-17

## 2019-02-04 ENCOUNTER — TELEPHONE (OUTPATIENT)
Dept: FAMILY MEDICINE CLINIC | Facility: CLINIC | Age: 69
End: 2019-02-04

## 2019-02-04 RX ORDER — HYDROXYCHLOROQUINE SULFATE 200 MG/1
200 TABLET, FILM COATED ORAL
Qty: 180 TABLET | Refills: 3 | Status: SHIPPED | OUTPATIENT
Start: 2019-02-04 | End: 2019-11-29

## 2019-02-04 NOTE — TELEPHONE ENCOUNTER
CLARA DOMINIQUE IS NEEDING REFILL ON PLAQUENIL TO BE SENT TO HOSPITAL [facility]  CALLBACK# [phone]

## 2019-02-06 RX ORDER — ALLOPURINOL 100 MG/1
100 TABLET ORAL 2 TIMES DAILY
Qty: 60 TABLET | Refills: 5 | Status: SHIPPED | OUTPATIENT
Start: 2019-02-06 | End: 2019-08-28

## 2019-02-25 ENCOUNTER — TELEPHONE (OUTPATIENT)
Dept: FAMILY MEDICINE CLINIC | Facility: CLINIC | Age: 69
End: 2019-02-25

## 2019-03-03 DIAGNOSIS — R73.9 HYPERGLYCEMIA: ICD-10-CM

## 2019-03-03 DIAGNOSIS — Z00.00 GENERAL MEDICAL EXAM: Primary | ICD-10-CM

## 2019-03-03 DIAGNOSIS — R53.83 MALAISE AND FATIGUE: ICD-10-CM

## 2019-03-03 DIAGNOSIS — R53.81 MALAISE AND FATIGUE: ICD-10-CM

## 2019-03-05 ENCOUNTER — APPOINTMENT (OUTPATIENT)
Dept: LAB | Facility: HOSPITAL | Age: 69
End: 2019-03-05

## 2019-03-05 LAB
25(OH)D3 SERPL-MCNC: 21.5 NG/ML (ref 30–100)
ALBUMIN SERPL-MCNC: 4.6 G/DL (ref 3.4–4.8)
ALBUMIN/GLOB SERPL: 1.3 G/DL (ref 1.1–1.8)
ALP SERPL-CCNC: 87 U/L (ref 38–126)
ALT SERPL W P-5'-P-CCNC: 23 U/L (ref 21–72)
ANION GAP SERPL CALCULATED.3IONS-SCNC: 12 MMOL/L (ref 5–15)
ARTICHOKE IGE QN: 99 MG/DL (ref 1–129)
AST SERPL-CCNC: 49 U/L (ref 17–59)
BASOPHILS # BLD AUTO: 0.05 10*3/MM3 (ref 0–0.2)
BASOPHILS NFR BLD AUTO: 1 % (ref 0–1.5)
BILIRUB SERPL-MCNC: 0.9 MG/DL (ref 0.2–1.3)
BUN BLD-MCNC: 16 MG/DL (ref 7–21)
BUN/CREAT SERPL: 13.2 (ref 7–25)
CALCIUM SPEC-SCNC: 9.8 MG/DL (ref 8.4–10.2)
CHLORIDE SERPL-SCNC: 95 MMOL/L (ref 95–110)
CHOLEST SERPL-MCNC: 168 MG/DL (ref 0–199)
CO2 SERPL-SCNC: 29 MMOL/L (ref 22–31)
CREAT BLD-MCNC: 1.21 MG/DL (ref 0.7–1.3)
DEPRECATED RDW RBC AUTO: 41.5 FL (ref 37–54)
EOSINOPHIL # BLD AUTO: 0.15 10*3/MM3 (ref 0–0.4)
EOSINOPHIL NFR BLD AUTO: 2.9 % (ref 0.3–6.2)
ERYTHROCYTE [DISTWIDTH] IN BLOOD BY AUTOMATED COUNT: 13.6 % (ref 12.3–15.4)
GFR SERPL CREATININE-BSD FRML MDRD: 59 ML/MIN/1.73 (ref 42–98)
GLOBULIN UR ELPH-MCNC: 3.6 GM/DL (ref 2.3–3.5)
GLUCOSE BLD-MCNC: 104 MG/DL (ref 60–100)
HBA1C MFR BLD: 6 % (ref 4–5.6)
HCT VFR BLD AUTO: 48.1 % (ref 37.5–51)
HDLC SERPL-MCNC: 32 MG/DL (ref 60–200)
HGB BLD-MCNC: 16.5 G/DL (ref 13–17.7)
IMM GRANULOCYTES # BLD AUTO: 0.02 10*3/MM3 (ref 0–0.05)
IMM GRANULOCYTES NFR BLD AUTO: 0.4 % (ref 0–0.5)
IRON 24H UR-MRATE: 95 MCG/DL (ref 49–181)
LDLC/HDLC SERPL: 3.59 {RATIO} (ref 0–3.55)
LYMPHOCYTES # BLD AUTO: 1.17 10*3/MM3 (ref 0.7–3.1)
LYMPHOCYTES NFR BLD AUTO: 22.3 % (ref 19.6–45.3)
MAGNESIUM SERPL-MCNC: 2 MG/DL (ref 1.6–2.3)
MCH RBC QN AUTO: 28.8 PG (ref 26.6–33)
MCHC RBC AUTO-ENTMCNC: 34.3 G/DL (ref 31.5–35.7)
MCV RBC AUTO: 83.9 FL (ref 79–97)
MONOCYTES # BLD AUTO: 0.6 10*3/MM3 (ref 0.1–0.9)
MONOCYTES NFR BLD AUTO: 11.4 % (ref 5–12)
NEUTROPHILS # BLD AUTO: 3.26 10*3/MM3 (ref 1.4–7)
NEUTROPHILS NFR BLD AUTO: 62 % (ref 42.7–76)
NRBC BLD AUTO-RTO: 0 /100 WBC (ref 0–0)
PLATELET # BLD AUTO: 221 10*3/MM3 (ref 140–450)
PMV BLD AUTO: 9.9 FL (ref 6–12)
POTASSIUM BLD-SCNC: 3.6 MMOL/L (ref 3.5–5.1)
PROT SERPL-MCNC: 8.2 G/DL (ref 6.3–8.6)
PSA SERPL-MCNC: 0.78 NG/ML (ref 0–4)
RBC # BLD AUTO: 5.73 10*6/MM3 (ref 4.14–5.8)
SODIUM BLD-SCNC: 136 MMOL/L (ref 137–145)
TRIGL SERPL-MCNC: 105 MG/DL (ref 20–199)
TSH SERPL DL<=0.05 MIU/L-ACNC: 4.18 MIU/ML (ref 0.46–4.68)
VIT B12 BLD-MCNC: 419 PG/ML (ref 239–931)
WBC NRBC COR # BLD: 5.25 10*3/MM3 (ref 3.4–10.8)

## 2019-03-05 PROCEDURE — 83735 ASSAY OF MAGNESIUM: CPT | Performed by: NURSE PRACTITIONER

## 2019-03-05 PROCEDURE — 85025 COMPLETE CBC W/AUTO DIFF WBC: CPT | Performed by: NURSE PRACTITIONER

## 2019-03-05 PROCEDURE — 82306 VITAMIN D 25 HYDROXY: CPT | Performed by: NURSE PRACTITIONER

## 2019-03-05 PROCEDURE — 36415 COLL VENOUS BLD VENIPUNCTURE: CPT | Performed by: NURSE PRACTITIONER

## 2019-03-05 PROCEDURE — 83540 ASSAY OF IRON: CPT | Performed by: NURSE PRACTITIONER

## 2019-03-05 PROCEDURE — 80053 COMPREHEN METABOLIC PANEL: CPT | Performed by: NURSE PRACTITIONER

## 2019-03-05 PROCEDURE — 82607 VITAMIN B-12: CPT | Performed by: NURSE PRACTITIONER

## 2019-03-05 PROCEDURE — 84153 ASSAY OF PSA TOTAL: CPT | Performed by: NURSE PRACTITIONER

## 2019-03-05 PROCEDURE — 80061 LIPID PANEL: CPT | Performed by: NURSE PRACTITIONER

## 2019-03-05 PROCEDURE — 84443 ASSAY THYROID STIM HORMONE: CPT | Performed by: NURSE PRACTITIONER

## 2019-03-05 PROCEDURE — 83036 HEMOGLOBIN GLYCOSYLATED A1C: CPT | Performed by: NURSE PRACTITIONER

## 2019-03-08 ENCOUNTER — OFFICE VISIT (OUTPATIENT)
Dept: FAMILY MEDICINE CLINIC | Facility: CLINIC | Age: 69
End: 2019-03-08

## 2019-03-08 VITALS
DIASTOLIC BLOOD PRESSURE: 60 MMHG | BODY MASS INDEX: 32.21 KG/M2 | HEIGHT: 70 IN | SYSTOLIC BLOOD PRESSURE: 110 MMHG | WEIGHT: 225 LBS

## 2019-03-08 DIAGNOSIS — H54.3 BLIND IN BOTH EYES: ICD-10-CM

## 2019-03-08 DIAGNOSIS — Z00.00 GENERAL MEDICAL EXAM: ICD-10-CM

## 2019-03-08 DIAGNOSIS — I10 ESSENTIAL HYPERTENSION: Primary | ICD-10-CM

## 2019-03-08 DIAGNOSIS — R53.81 MALAISE: ICD-10-CM

## 2019-03-08 DIAGNOSIS — E78.2 MIXED HYPERLIPIDEMIA: ICD-10-CM

## 2019-03-08 DIAGNOSIS — Z23 NEED FOR VACCINATION: ICD-10-CM

## 2019-03-08 PROCEDURE — 99214 OFFICE O/P EST MOD 30 MIN: CPT | Performed by: NURSE PRACTITIONER

## 2019-03-08 RX ORDER — ERGOCALCIFEROL 1.25 MG/1
50000 CAPSULE ORAL
Qty: 4 CAPSULE | Refills: 11 | Status: SHIPPED | OUTPATIENT
Start: 2019-03-08 | End: 2020-03-31 | Stop reason: SDUPTHER

## 2019-03-08 NOTE — PROGRESS NOTES
Chief Complaint   Patient presents with   • Annual Exam     had labs done yesica Mcbride   Henrik Sands is a 69 y.o. male.     Hypertension   This is a chronic problem. The current episode started more than 1 month ago. The problem has been gradually worsening since onset. The problem is controlled. Associated symptoms include headaches, malaise/fatigue and neck pain. Pertinent negatives include no anxiety, blurred vision, chest pain, orthopnea, palpitations, peripheral edema, PND, shortness of breath or sweats. Risk factors for coronary artery disease include dyslipidemia, male gender and sedentary lifestyle. Past treatments include ACE inhibitors, calcium channel blockers and beta blockers. Current antihypertension treatment includes alpha 1 blockers. The current treatment provides mild improvement. Compliance problems include diet.  Hypertensive end-organ damage includes angina. There is no history of kidney disease, CAD/MI, CVA, heart failure, left ventricular hypertrophy, PVD or retinopathy. There is no history of chronic renal disease, coarctation of the aorta, hyperaldosteronism, hypercortisolism, hyperparathyroidism, a hypertension causing med, pheochromocytoma, renovascular disease, sleep apnea or a thyroid problem.   Hyperlipidemia   This is a chronic problem. The problem is controlled. Recent lipid tests were reviewed and are normal. He has no history of chronic renal disease, diabetes, hypothyroidism, liver disease, obesity or nephrotic syndrome. Associated symptoms include myalgias. Pertinent negatives include no chest pain or shortness of breath. The current treatment provides mild improvement of lipids. Compliance problems include adherence to diet.  Risk factors for coronary artery disease include dyslipidemia and hypertension.   Fatigue   This is a recurrent problem. The current episode started 1 to 4 weeks ago. The problem occurs every several days. The problem has been gradually  worsening. Associated symptoms include arthralgias, fatigue, headaches, joint swelling, myalgias, neck pain and a visual change. Pertinent negatives include no abdominal pain, anorexia, change in bowel habit, chest pain, chills, congestion, coughing, diaphoresis, fever, nausea, numbness, rash, sore throat, swollen glands, urinary symptoms, vertigo, vomiting or weakness. The treatment provided mild relief.   Loss of Vision   This is a chronic problem. The current episode started more than 1 year ago. The problem occurs constantly. The problem has been unchanged. Associated symptoms include arthralgias, fatigue, headaches, joint swelling, myalgias, neck pain and a visual change. Pertinent negatives include no abdominal pain, anorexia, change in bowel habit, chest pain, chills, congestion, coughing, diaphoresis, fever, nausea, numbness, rash, sore throat, swollen glands, urinary symptoms, vertigo, vomiting or weakness. The treatment provided no relief.        The following portions of the patient's history were reviewed and updated as appropriate: allergies, current medications, past social history and problem list.    Review of Systems   Constitutional: Positive for activity change, appetite change, fatigue, malaise/fatigue and unexpected weight change. Negative for chills, diaphoresis and fever.   HENT: Positive for hearing loss. Negative for congestion, dental problem, sinus pressure, sneezing, sore throat, tinnitus, trouble swallowing and voice change.         Hard of hearing    Eyes: Positive for visual disturbance. Negative for blurred vision, pain, discharge, redness and itching.        Chronic vision loss -opic neuropathy-followed by Dr Wei   Respiratory: Negative.  Negative for apnea, cough, choking, chest tightness, shortness of breath, wheezing and stridor.    Cardiovascular: Negative.  Negative for chest pain, palpitations, orthopnea, leg swelling and PND.   Gastrointestinal: Negative.  Negative for  "abdominal pain, anorexia, change in bowel habit, nausea and vomiting.   Endocrine: Negative.  Negative for polydipsia, polyphagia and polyuria.   Genitourinary: Positive for frequency. Negative for decreased urine volume.   Musculoskeletal: Positive for arthralgias, back pain, gait problem, joint swelling, myalgias, neck pain and neck stiffness.   Skin: Negative.  Negative for color change, pallor and rash.   Allergic/Immunologic: Negative.  Negative for environmental allergies, food allergies and immunocompromised state.   Neurological: Positive for syncope and headaches. Negative for dizziness, vertigo, facial asymmetry, weakness and numbness.   Hematological: Negative.  Negative for adenopathy. Does not bruise/bleed easily.   Psychiatric/Behavioral: Positive for decreased concentration. Negative for agitation, behavioral problems and confusion. The patient is nervous/anxious.        Objective   /60   Ht 177.8 cm (70\")   Wt 102 kg (225 lb)   BMI 32.28 kg/m²   Physical Exam   Constitutional: He is oriented to person, place, and time. He appears well-developed and well-nourished. No distress.   Wife answers questions for him due to disability    HENT:   Head: Normocephalic and atraumatic.   Right Ear: External ear normal.   Left Ear: External ear normal.   Mouth/Throat: Oropharynx is clear and moist. No oropharyngeal exudate.   Patient blind chronic -hard of hearing    Eyes: EOM are normal. Pupils are equal, round, and reactive to light. Right eye exhibits no discharge. Left eye exhibits no discharge. No scleral icterus.   Neck: Normal range of motion. Neck supple. No JVD present. No tracheal deviation present. No thyromegaly present.   Cardiovascular: Normal rate, regular rhythm, normal heart sounds and intact distal pulses. Exam reveals no gallop and no friction rub.   No murmur heard.  Bruit left carotid mild    Pulmonary/Chest: Effort normal and breath sounds normal. No stridor. No respiratory distress. " He has no wheezes. He has no rales. He exhibits no tenderness.   Abdominal: Soft. Bowel sounds are normal. He exhibits no distension and no mass. There is no tenderness. There is no rebound and no guarding. No hernia.   Genitourinary: Rectum normal and penis normal.   Musculoskeletal: Normal range of motion. He exhibits tenderness. He exhibits no edema or deformity.   Lower leg vascular changes    Lymphadenopathy:     He has no cervical adenopathy.   Neurological: He is alert and oriented to person, place, and time. He has normal reflexes. He displays normal reflexes. No cranial nerve deficit or sensory deficit. He exhibits normal muscle tone. Coordination normal.   Slow to follow commands    Skin: Skin is warm and dry. No rash noted. He is not diaphoretic. No erythema. No pallor.   Psychiatric: He has a normal mood and affect.   Nursing note and vitals reviewed.      Assessment/Plan   Problem List Items Addressed This Visit        Cardiovascular and Mediastinum    Essential hypertension - Primary    Relevant Orders    Vitamin D 25 Hydroxy    CBC & Differential    Comprehensive Metabolic Panel    Hemoglobin A1c    Iron    Lipid Panel    Vitamin D 25 Hydroxy    Vitamin B12    TSH    Magnesium    PSA Screen    Mixed hyperlipidemia    Relevant Orders    Vitamin D 25 Hydroxy    CBC & Differential    Comprehensive Metabolic Panel    Hemoglobin A1c    Iron    Lipid Panel    Vitamin D 25 Hydroxy    Vitamin B12    TSH    Magnesium    PSA Screen       Other    General medical exam    Relevant Orders    Vitamin D 25 Hydroxy    CBC & Differential    Comprehensive Metabolic Panel    Hemoglobin A1c    Iron    Lipid Panel    Vitamin D 25 Hydroxy    Vitamin B12    TSH    Magnesium    PSA Screen    Malaise    Relevant Orders    Vitamin D 25 Hydroxy    CBC & Differential    Comprehensive Metabolic Panel    Hemoglobin A1c    Iron    Lipid Panel    Vitamin D 25 Hydroxy    Vitamin B12    TSH    Magnesium    PSA Screen    Blind in both  eyes    Need for vaccination    Relevant Medications    pneumococcal polysaccharide 23-valent (PNEUMOVAX-23) vaccine 0.5 mL (Completed)           New Medications Ordered This Visit   Medications   • vitamin D (ERGOCALCIFEROL) 34545 units capsule capsule     Sig: Take 1 capsule by mouth Every 7 (Seven) Days.     Dispense:  4 capsule     Refill:  11   • pneumococcal polysaccharide 23-valent (PNEUMOVAX-23) vaccine 0.5 mL       It's not just what you eat, but when you eat  Eat breakfast, and eat smaller meals throughout the day. A healthy breakfast can jumpstart your metabolism, while eating small, healthy meals (rather than the standard three large meals) keeps your energy up.   Avoid eating at night. Try to eat dinner earlier and fast for 14-16 hours until breakfast the next morning. Studies suggest that eating only when you’re most active and giving your digestive system a long break each day may help to regulate weight.     Add vitamin d -labs as directed -follow up with ophthalmology as directed for continued care -update pneumonia vaccine for now. Follow up in 6 months

## 2019-04-10 RX ORDER — POTASSIUM CITRATE 10 MEQ/1
20 TABLET, EXTENDED RELEASE ORAL 4 TIMES DAILY
Qty: 240 TABLET | Refills: 1 | Status: SHIPPED | OUTPATIENT
Start: 2019-04-10 | End: 2019-09-25

## 2019-06-28 ENCOUNTER — TELEPHONE (OUTPATIENT)
Dept: FAMILY MEDICINE CLINIC | Facility: CLINIC | Age: 69
End: 2019-06-28

## 2019-06-28 NOTE — TELEPHONE ENCOUNTER
CLARA DOMINIQUE IS NEEDING REFILL ON FREE STYLE LITE TEST STRIPS TO BE SENT TO [facility]  CALLBACK# [phone]

## 2019-07-17 RX ORDER — VALSARTAN AND HYDROCHLOROTHIAZIDE 320; 25 MG/1; MG/1
1 TABLET, FILM COATED ORAL DAILY
Qty: 90 TABLET | Refills: 1 | Status: SHIPPED | OUTPATIENT
Start: 2019-07-17 | End: 2019-10-14 | Stop reason: SDUPTHER

## 2019-07-17 RX ORDER — AMLODIPINE BESYLATE 10 MG/1
10 TABLET ORAL DAILY
Qty: 90 TABLET | Refills: 1 | Status: SHIPPED | OUTPATIENT
Start: 2019-07-17 | End: 2019-10-14 | Stop reason: SDUPTHER

## 2019-08-23 ENCOUNTER — HOSPITAL ENCOUNTER (OUTPATIENT)
Dept: ULTRASOUND IMAGING | Facility: HOSPITAL | Age: 69
Discharge: HOME | End: 2019-08-23
Admitting: NURSE PRACTITIONER

## 2019-08-23 ENCOUNTER — OFFICE VISIT (OUTPATIENT)
Dept: FAMILY MEDICINE CLINIC | Facility: CLINIC | Age: 69
End: 2019-08-23

## 2019-08-23 VITALS
SYSTOLIC BLOOD PRESSURE: 140 MMHG | DIASTOLIC BLOOD PRESSURE: 72 MMHG | BODY MASS INDEX: 32 KG/M2 | WEIGHT: 223.5 LBS | HEIGHT: 70 IN

## 2019-08-23 DIAGNOSIS — L02.415 CELLULITIS AND ABSCESS OF RIGHT LEG: Primary | ICD-10-CM

## 2019-08-23 DIAGNOSIS — L03.115 CELLULITIS AND ABSCESS OF RIGHT LEG: Primary | ICD-10-CM

## 2019-08-23 DIAGNOSIS — M79.661 PAIN AND SWELLING OF LOWER LEG, RIGHT: ICD-10-CM

## 2019-08-23 DIAGNOSIS — M79.89 PAIN AND SWELLING OF LOWER LEG, RIGHT: ICD-10-CM

## 2019-08-23 PROCEDURE — 93971 EXTREMITY STUDY: CPT

## 2019-08-23 PROCEDURE — 99213 OFFICE O/P EST LOW 20 MIN: CPT | Performed by: NURSE PRACTITIONER

## 2019-08-23 RX ORDER — CEPHALEXIN 500 MG/1
500 CAPSULE ORAL 3 TIMES DAILY
Qty: 30 CAPSULE | Refills: 0 | Status: SHIPPED | OUTPATIENT
Start: 2019-08-23 | End: 2019-09-23

## 2019-08-23 NOTE — PROGRESS NOTES
"  Chief Complaint   Patient presents with   • Leg Swelling     right leg pain and swelling x 4 days     Subjective   Henrik Sands is a 69 y.o. male.     Presents with pain and swelling right lower leg       Leg Swelling   This is a new problem. The current episode started yesterday. The problem occurs daily. The problem has been gradually worsening. Associated symptoms include arthralgias. Pertinent negatives include no abdominal pain, anorexia, change in bowel habit, chest pain, chills, congestion, coughing, diaphoresis, fatigue, fever, headaches, joint swelling, myalgias, nausea, neck pain, numbness, rash, sore throat, swollen glands, urinary symptoms, vertigo, visual change, vomiting or weakness. The symptoms are aggravated by walking. He has tried rest for the symptoms. The treatment provided no relief.        The following portions of the patient's history were reviewed and updated as appropriate: allergies, current medications, past social history and problem list.    Review of Systems   Constitutional: Negative for chills, diaphoresis, fatigue and fever.   HENT: Negative for congestion and sore throat.    Eyes: Negative.    Respiratory: Negative.  Negative for cough.    Cardiovascular: Positive for leg swelling. Negative for chest pain and palpitations.   Gastrointestinal: Negative.  Negative for abdominal pain, anorexia, change in bowel habit, nausea and vomiting.   Genitourinary: Negative.    Musculoskeletal: Positive for arthralgias. Negative for back pain, gait problem, joint swelling, myalgias, neck pain and neck stiffness.   Skin: Negative for rash.        Leg swelling and pain right lower leg only    Neurological: Negative for vertigo, weakness, numbness and headaches.       Objective   /72   Ht 177.8 cm (70\")   Wt 101 kg (223 lb 8 oz)   BMI 32.07 kg/m²   Physical Exam   Constitutional: He is oriented to person, place, and time. He appears well-developed and well-nourished. No " distress.   Wife answers questions for him due to disability    HENT:   Head: Normocephalic and atraumatic.   Right Ear: External ear normal.   Left Ear: External ear normal.   Mouth/Throat: Oropharynx is clear and moist. No oropharyngeal exudate.   Patient blind chronic -hard of hearing    Eyes: EOM are normal. Pupils are equal, round, and reactive to light. Right eye exhibits no discharge. Left eye exhibits no discharge. No scleral icterus.   Neck: Normal range of motion. Neck supple. No JVD present. No tracheal deviation present. No thyromegaly present.   Cardiovascular: Normal rate, regular rhythm, normal heart sounds and intact distal pulses. Exam reveals no gallop and no friction rub.   No murmur heard.  Bruit left carotid mild    Pulmonary/Chest: Effort normal and breath sounds normal. No stridor. No respiratory distress. He has no wheezes. He has no rales. He exhibits no tenderness.   Abdominal: Soft. Bowel sounds are normal. He exhibits no distension and no mass. There is no tenderness. There is no rebound and no guarding. No hernia.   Genitourinary: Rectum normal and penis normal.   Musculoskeletal: Normal range of motion. He exhibits tenderness. He exhibits no edema or deformity.   Swelling right lower leg anterior -pain and tender so touch    Lymphadenopathy:     He has no cervical adenopathy.   Neurological: He is alert and oriented to person, place, and time. He has normal reflexes. He displays normal reflexes. No cranial nerve deficit or sensory deficit. He exhibits normal muscle tone. Coordination normal.   Slow to follow commands    Skin: Skin is warm and dry. No rash noted. He is not diaphoretic. No erythema. No pallor.   Psychiatric: He has a normal mood and affect.   Nursing note and vitals reviewed.    Rd Vicente MD 8/23/2019       Narrative     Doppler duplex venous examination right lower extremity.    CLINICAL INDICATION: Right leg pain.    .       COMPARISON: None    FINDINGS:    The  common femoral,  femoral, and popliteal veins of the right      lower extremity are well identified and compress normally.   Doppler signals are heard either spontaneously or on distal  compression.  No evidence of intraluminal thrombus was noted.     The visualized posterior calf veins are normal.         Impression     CONCLUSION:  No evidence of deep venous thrombosis in the common  femoral, femoral, or popliteal veins of the right     lower  extremity.       Electronically signed by:  Rd Vicente MD  8/23/2019 12:25 PM CDT  Workstation: [id number]            Assessment/Plan   Problem List Items Addressed This Visit        Nervous and Auditory    Pain and swelling of lower leg, right    Relevant Orders    US venous doppler lower extremity right (duplex) (Completed)       Other    Cellulitis and abscess of right leg - Primary           New Medications Ordered This Visit   Medications   • cephalexin (KEFLEX) 500 MG capsule     Sig: Take 1 capsule by mouth 3 (Three) Times a Day.     Dispense:  30 capsule     Refill:  0      us right lower leg today as directed-neg-heat over the weekend, rest as directed -keflex tid x 10days

## 2019-08-28 RX ORDER — ALLOPURINOL 100 MG/1
100 TABLET ORAL 2 TIMES DAILY
Qty: 60 TABLET | Refills: 5 | Status: SHIPPED | OUTPATIENT
Start: 2019-08-28 | End: 2020-03-12

## 2019-09-18 ENCOUNTER — OFFICE VISIT (OUTPATIENT)
Dept: FAMILY MEDICINE CLINIC | Facility: CLINIC | Age: 69
End: 2019-09-18

## 2019-09-18 ENCOUNTER — HOSPITAL ENCOUNTER (OUTPATIENT)
Dept: ULTRASOUND IMAGING | Facility: HOSPITAL | Age: 69
Discharge: HOME | End: 2019-09-18
Admitting: NURSE PRACTITIONER

## 2019-09-18 VITALS
WEIGHT: 222 LBS | HEIGHT: 70 IN | BODY MASS INDEX: 31.78 KG/M2 | DIASTOLIC BLOOD PRESSURE: 78 MMHG | SYSTOLIC BLOOD PRESSURE: 130 MMHG

## 2019-09-18 DIAGNOSIS — R10.31 RIGHT INGUINAL PAIN: Primary | ICD-10-CM

## 2019-09-18 PROBLEM — R10.30 INGUINAL PAIN: Status: ACTIVE | Noted: 2019-08-23

## 2019-09-18 PROCEDURE — 76882 US LMTD JT/FCL EVL NVASC XTR: CPT

## 2019-09-18 PROCEDURE — 99213 OFFICE O/P EST LOW 20 MIN: CPT | Performed by: NURSE PRACTITIONER

## 2019-09-18 NOTE — PROGRESS NOTES
Chief Complaint   Patient presents with   • Groin Pain     pain since picking up some heavy stuff     Subjective   Henrik Sands is a 69 y.o. male.     Presents with sudden onset of pain on Monday-severe yesterday, better today, swelling right lower groin, no hx of same in the past       Groin Pain   The patient's primary symptoms include a genital injury and pelvic pain. The patient's pertinent negatives include no genital itching, genital lesions, penile discharge, penile pain, priapism, scrotal swelling or testicular pain. This is a new problem. The current episode started in the past 7 days. The problem occurs daily. The problem has been gradually worsening. The pain is severe. Pertinent negatives include no abdominal pain, anorexia, chest pain, chills, constipation, coughing, diarrhea, discolored urine, dysuria, fever, flank pain, frequency, headaches, hematuria, hesitancy, joint pain, joint swelling, nausea, painful intercourse, rash, shortness of breath, sore throat, urgency, urinary retention or vomiting. There is no reported injury. The problem affects the right side. The injury mechanism is unknown. The symptoms are aggravated by heavy lifting. He has tried rest and a cold pack for the symptoms. The treatment provided no relief. He is not sexually active. No, his partner does not have an STD. There is no history of BPH, chlamydia, cryptorchidism, a femoral hernia, gonorrhea, herpes simplex, kidney stones, prostatitis or sickle cell disease.        The following portions of the patient's history were reviewed and updated as appropriate: allergies, current medications, past social history and problem list.    Review of Systems   Constitutional: Negative for chills and fever.   HENT: Negative for sore throat.    Eyes: Negative.    Respiratory: Negative for cough and shortness of breath.    Cardiovascular: Negative.  Negative for chest pain.   Gastrointestinal: Negative.  Negative for abdominal pain,  "anorexia, constipation, diarrhea, nausea and vomiting.   Endocrine: Negative.    Genitourinary: Positive for pelvic pain. Negative for discharge, dysuria, flank pain, frequency, hesitancy, penile pain, scrotal swelling, testicular pain and urgency.        Right groin pain    Musculoskeletal: Negative.  Negative for joint pain.   Skin: Negative.  Negative for rash.   Allergic/Immunologic: Negative.    Neurological: Negative.  Negative for headaches.   Hematological: Negative.    Psychiatric/Behavioral: Negative.  Negative for agitation, behavioral problems and confusion.       Objective   /78   Ht 177.8 cm (70\")   Wt 101 kg (222 lb)   BMI 31.85 kg/m²   Physical Exam   Constitutional: He is oriented to person, place, and time. He appears well-developed and well-nourished. No distress.   Wife answers questions for him due to disability    HENT:   Head: Normocephalic and atraumatic.   Right Ear: External ear normal.   Left Ear: External ear normal.   Mouth/Throat: Oropharynx is clear and moist. No oropharyngeal exudate.   Patient blind chronic -hard of hearing    Eyes: EOM are normal. Pupils are equal, round, and reactive to light. Right eye exhibits no discharge. Left eye exhibits no discharge. No scleral icterus.   Neck: Normal range of motion. Neck supple. No JVD present. No tracheal deviation present. No thyromegaly present.   Cardiovascular: Normal rate, regular rhythm, normal heart sounds and intact distal pulses. Exam reveals no gallop and no friction rub.   No murmur heard.  Bruit left carotid mild    Pulmonary/Chest: Effort normal and breath sounds normal. No stridor. No respiratory distress. He has no wheezes. He has no rales. He exhibits no tenderness.   Abdominal: Soft. Bowel sounds are normal. He exhibits no distension and no mass. There is no hepatosplenomegaly, splenomegaly or hepatomegaly. There is tenderness in the suprapubic area. There is no rigidity, no rebound, no guarding, no CVA " tenderness, no tenderness at McBurney's point and negative Santiago's sign. A hernia is present. Hernia confirmed negative in the ventral area and confirmed negative in the right inguinal area.       Genitourinary: Rectum normal and penis normal.   Musculoskeletal: Normal range of motion. He exhibits tenderness. He exhibits no edema or deformity.   Lower leg vascular changes    Lymphadenopathy:     He has no cervical adenopathy.   Neurological: He is alert and oriented to person, place, and time. He has normal reflexes. He displays normal reflexes. No cranial nerve deficit or sensory deficit. He exhibits normal muscle tone. Coordination normal.   Slow to follow commands    Skin: Skin is warm and dry. No rash noted. He is not diaphoretic. No erythema. No pallor.   Psychiatric: He has a normal mood and affect.   Nursing note and vitals reviewed.      Assessment/Plan  Details     Reading Physician Reading Date Result Priority   Markel Prakash MD 9/18/2019       Narrative       PROCEDURE: US EXTREMITY MUSCULOSKELETAL LIMITED    TECHNIQUE:  Grayscale and color Doppler ultrasound imaging of the  painful, palpable area of concern in the right groin with  comparison imaging of the left groin    COMPARISON: CT abdomen and pelvis dated 6/19/2015.    HISTORY: pain, R10.31 Right lower quadrant pain    FINDINGS:  Sonographic imaging of the right groin shows liver and elongated  heterogeneous area of fat, probably within the inguinal canal,  possibly a fat-containing inguinal hernia versus fat within the  groin. Comparative imaging of the left groin appears very  similar. No peristalsing loops of bowel are noted. No hernias  were noted on the previous CT from 6/9/2015.      Impression     CONCLUSION:  Sonographic imaging of the right groin shows liver and elongated  heterogeneous area of fat, probably within the inguinal canal,  possibly a fat-containing inguinal hernia versus fat within the  groin. Comparative imaging of the  left groin appears very  similar. No peristalsing loops of bowel are noted. If there  remains clinical concern for a hernia or other pathology,  recommend CT.    Electronically signed by:  Markel Prakash MD  9/18/2019 2:25 PM CDT  Workstation: [id number]       Problem List Items Addressed This Visit        Nervous and Auditory    Inguinal pain - Primary    Relevant Orders    US Nonvascular Extremity Limited (Completed)    Ambulatory Referral to General Surgery         No orders of the defined types were placed in this encounter.     refer to surgeon of choice, us of groin, refused pain medication, continue ice packs, follow up as directed

## 2019-09-23 ENCOUNTER — OFFICE VISIT (OUTPATIENT)
Dept: SURGERY | Facility: CLINIC | Age: 69
End: 2019-09-23

## 2019-09-23 VITALS
TEMPERATURE: 97.2 F | DIASTOLIC BLOOD PRESSURE: 70 MMHG | WEIGHT: 221.8 LBS | BODY MASS INDEX: 31.75 KG/M2 | HEIGHT: 70 IN | HEART RATE: 63 BPM | SYSTOLIC BLOOD PRESSURE: 138 MMHG

## 2019-09-23 DIAGNOSIS — S76.211A GROIN STRAIN, RIGHT, INITIAL ENCOUNTER: Primary | ICD-10-CM

## 2019-09-23 PROCEDURE — 99203 OFFICE O/P NEW LOW 30 MIN: CPT | Performed by: SURGERY

## 2019-09-25 RX ORDER — POTASSIUM CITRATE 10 MEQ/1
20 TABLET, EXTENDED RELEASE ORAL 4 TIMES DAILY
Qty: 240 TABLET | Refills: 1 | Status: SHIPPED | OUTPATIENT
Start: 2019-09-25 | End: 2020-01-28

## 2019-09-29 NOTE — PROGRESS NOTES
CHIEF COMPLAINT:    Right groin pain, possible hernia    HISTORY OF PRESENT ILLNESS:    Henrik Sands is a 69 y.o. male who noted acute onset of right groin pain approximately 2 weeks ago while working on his tractor.  He states the pain at that time was sharp and fairly severe.  It has abated somewhat since that time but still occurs constantly.  It is partially relieved by rest.  He was worked up by his PCP with an ultrasound of this area that showed fat within the inguinal canal with a possible hernia.  He was referred to me for further care.    Past Medical History:   Diagnosis Date   • Abdominal bloating    • After-cataract with vision obscured     left   • Allergic rhinitis    • Artificial lens present     both   • Asthma     cough variant   • Asthma     IgE-mediated allergic asthma      • Colonic polyp     Polyp of large intestine - multiple      • Cranial nerve disorder, unspecified      right 3rd x 10days to 2wks      • Diabetes mellitus (CMS/HCC)    • Disorder of skin     lesion to right forehead      • Essential hypertension     which may be renovascular in origin      • MEL (generalized anxiety disorder)    • Generalized colicky abdominal pain    • History of colonic polyps    • History of echocardiogram 04/25/2008    normal systolic function,minimal eft atrial enlargement,aortic root upper limits of normal   • History of malignant neoplasm of colon    • Hyperlipidemia    • Hypokalemia     persistent   • Ischemic optic neuropathy of both eyes    • Kidney stone     with hyperoxaluria now for 1st time      • Long term use of drug    • Malaise and fatigue    • Neoplasm of uncertain behavior of skin    • 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   • Osteoarthritis    • Pain in lower limb    • Primary malignant neoplasm of colon (CMS/HCC)     metastatic,without evidence of recurrent metastase      • Pseudophakia    • Renal impairment     stage 1   • Stasis edema    • Type 2 diabetes mellitus (CMS/HCC)     no BDR   •  Vitamin D deficiency     on treatment       Past Surgical History:   Procedure Laterality Date   • CARDIAC CATHETERIZATION  04/25/2008    selective coronary angiography,right iliofemoral,mild nonobstructive CAD,potential for dilated ascending aorta given the necessity of using a JL5   • CARDIAC CATHETERIZATION N/A 7/19/2018    Procedure: Left Heart Cath/ PCI if indicated;  Surgeon: Erik So MD;  Location: [facility] INVASIVE LOCATION;  Service: Cardiovascular   • CATARACT EXTRACTION  06/18/2014    AFTER CATARACT LASER SURGERY 42804 (1)      • CATARACT EXTRACTION W/ INTRAOCULAR LENS IMPLANT Left 12/19/2006   • COLECTOMY PARTIAL / TOTAL  01/31/2007    low anterior resection of the colon with stapled colorectal anastomosis.Middle rectal cancer w/suspected sigmoid serosal implants at the rectosigmoid junction   • COLONOSCOPY W/ POLYPECTOMY  02/11/2016    Patent end-to-end colo-colonic anastomosis.Melanosis in the colon.One 2 mm polyp at 85 cm prox.to anus.Resected and retrieved.One 5 mm polyp at 80 cm prox to anus.Resected and retrieved.One 1 mm polyp at 40 cm prox. to anus.Resected and retrieved.   • EXCISION LESION  11/06/2001    Electrodesiccation and curettage x 3, right upper back   • EXTRACORPOREAL SHOCK WAVE LITHOTRIPSY (ESWL)  08/24/2005    right extracorporeal shock wave 1000 shocks at an energy level 9.Bilateral ureteral calculus,right one symptomatic.Stone 5 x7   • MEDIPORT INSERTION, SINGLE  04/11/2007    left subclavin Mediport,metastatic colon cancer       Prior to Admission medications    Medication Sig Start Date End Date Taking? Authorizing Provider   allopurinol (ZYLOPRIM) 100 MG tablet Take 1 tablet by mouth 2 (Two) Times a Day for gout. 8/28/19  Yes Caron Stinson APRN   amLODIPine (NORVASC) 10 MG tablet Take 1 tablet by mouth Daily. 7/17/19  Yes Caron Stinson APRN   glucose blood (FREESTYLE TEST STRIPS) test strip Use to test blood sugar up to 3 times daily  **Freestyle Lite** 6/28/19  Yes Caron Stinson APRN   hydroxychloroquine (PLAQUENIL) 200 MG tablet Take 1 tablet by mouth 2 (Two) Times a Day. 2/4/19  Yes Caron Stinson APRN   Lancets (FREESTYLE) lancets Use to test blood sugar up to 3 times daily **Freestyle** 11/19/18  Yes Caron Stinson APRN   metoprolol succinate XL (TOPROL-XL) 25 MG 24 hr tablet Take 1 tablet by mouth Daily. 1/2/19  Yes Caron Stinson APRN   raNITIdine (ZANTAC) 150 MG tablet Take 150 mg by mouth 2 (Two) Times a Day.   Yes Provider, MD [place]   valsartan-hydrochlorothiazide (DIOVAN-HCT) 320-25 MG per tablet Take 1 tablet by mouth Daily. 7/17/19  Yes Caron Stinson APRN   vitamin D (ERGOCALCIFEROL) 85003 units capsule capsule Take 1 capsule by mouth Every 7 (Seven) Days. 3/8/19  Yes Caron Stinson APRN   albuterol (PROVENTIL HFA;VENTOLIN HFA) 108 (90 Base) MCG/ACT inhaler Inhale 2 puffs Every 4 (Four) Hours As Needed for Wheezing. 4/25/18   Caron Stinson APRN   potassium citrate (UROCIT-K) 10 MEQ (1080 MG) CR tablet Take 2 tablets by mouth 4 (Four) Times a Day for hypokalemia. 9/25/19   Caron Stinson APRN       Allergies   Allergen Reactions   • Latex Rash       History reviewed. No pertinent family history.    Social History     Socioeconomic History   • Marital status:      Spouse name: Not on file   • Number of children: Not on file   • Years of education: Not on file   • Highest education level: Not on file   Tobacco Use   • Smoking status: Never Smoker   • Smokeless tobacco: Never Used   Substance and Sexual Activity   • Alcohol use: No       Review of Systems   Constitutional: Negative for activity change, appetite change, chills and fever.   HENT: Negative for hearing loss, nosebleeds and trouble swallowing.    Cardiovascular: Negative for chest pain, palpitations and leg swelling.   Gastrointestinal: Negative for abdominal distention, abdominal pain, anal  "bleeding, blood in stool, constipation, diarrhea, nausea, rectal pain and vomiting.   Endocrine: Negative for cold intolerance, heat intolerance, polydipsia and polyuria.   Genitourinary: Negative for decreased urine volume, difficulty urinating, dysuria, enuresis, frequency, hematuria and urgency.   Musculoskeletal: Negative for arthralgias, back pain, gait problem, myalgias and neck pain.        Right groin pain   Skin: Negative for pallor, rash and wound.   Allergic/Immunologic: Negative for immunocompromised state.   Neurological: Negative for dizziness, seizures, weakness, light-headedness, numbness and headaches.   Psychiatric/Behavioral: Negative for agitation and behavioral problems. The patient is not nervous/anxious.        Objective     /70   Pulse 63   Temp 97.2 °F (36.2 °C) (Temporal)   Ht 177.8 cm (70\")   Wt 101 kg (221 lb 12.8 oz)   BMI 31.82 kg/m²     Physical Exam   Constitutional: He is oriented to person, place, and time. He appears well-developed and well-nourished.   HENT:   Head: Normocephalic and atraumatic.   Nose: Nose normal.   Eyes: Conjunctivae and EOM are normal. Right eye exhibits no discharge. Left eye exhibits no discharge.   Neck: Trachea normal, normal range of motion and phonation normal. Neck supple. No JVD present. No tracheal deviation and no edema present. No thyromegaly present.   Cardiovascular: Normal rate, regular rhythm and normal heart sounds. Exam reveals no gallop and no friction rub.   No murmur heard.  Pulmonary/Chest: Effort normal and breath sounds normal. No accessory muscle usage. No respiratory distress. He has no decreased breath sounds. He has no wheezes. He has no rales. He exhibits no tenderness.   Abdominal: Soft. He exhibits no distension, no fluid wave, no ascites, no pulsatile midline mass and no mass. There is no tenderness. There is no rebound and no guarding. No hernia. Hernia confirmed negative in the right inguinal area and confirmed " negative in the left inguinal area.   Musculoskeletal: Normal range of motion. He exhibits no edema, tenderness or deformity.   Lymphadenopathy:     He has no cervical adenopathy.        Left: No supraclavicular adenopathy present.   Neurological: He is alert and oriented to person, place, and time. He has normal strength. No cranial nerve deficit.   Skin: Skin is warm and dry. No rash noted. He is not diaphoretic. No erythema. No pallor.   Psychiatric: He has a normal mood and affect. His speech is normal and behavior is normal. Judgment and thought content normal. Cognition and memory are normal.   Vitals reviewed.      DIAGNOSTIC DATA:    Ultrasound report reviewed showing fat within the inguinal canal with possible hernia    ASSESSMENT:    Right groin pain with no hernia on exam, probable right groin muscular strain    PLAN:    The patient does not appear to have a hernia on exam.  I suspect he has fat along his cord and that is what is seen on the ultrasound.  Given that he has no palpable hernia I believe that he has strained a muscle in his right groin.  After further discussion with the patient he does recall having done this previously.  I advised him rest, use of warm or cool compresses as desired, NSAIDs as needed.  We will see him back in approximately 6 weeks to assess for resolution of his pain.          This document has been electronically signed by Jason Peng MD on September 29, 2019 2:08 PM

## 2019-10-14 ENCOUNTER — TELEPHONE (OUTPATIENT)
Dept: FAMILY MEDICINE CLINIC | Facility: CLINIC | Age: 69
End: 2019-10-14

## 2019-10-14 RX ORDER — AMLODIPINE BESYLATE 10 MG/1
10 TABLET ORAL DAILY
Qty: 90 TABLET | Refills: 3 | Status: SHIPPED | OUTPATIENT
Start: 2019-10-14 | End: 2020-07-29

## 2019-10-14 RX ORDER — VALSARTAN AND HYDROCHLOROTHIAZIDE 320; 25 MG/1; MG/1
1 TABLET, FILM COATED ORAL DAILY
Qty: 90 TABLET | Refills: 3 | Status: SHIPPED | OUTPATIENT
Start: 2019-10-14 | End: 2020-07-15

## 2019-10-14 NOTE — TELEPHONE ENCOUNTER
CLARA DOMINIQUE IS NEEDING NEW PRESP REFILLS ON THE AMLODIPINE AND LOSARTAN/HCTZ TO BE SENT TO [facility] [place] [facility]  CALLBACK# [id number] [phone]

## 2019-12-02 RX ORDER — METOPROLOL SUCCINATE 25 MG/1
25 TABLET, EXTENDED RELEASE ORAL DAILY
Qty: 30 TABLET | Refills: 5 | Status: SHIPPED | OUTPATIENT
Start: 2019-12-02 | End: 2020-05-29

## 2019-12-02 RX ORDER — HYDROXYCHLOROQUINE SULFATE 200 MG/1
200 TABLET, FILM COATED ORAL
Qty: 180 TABLET | Refills: 1 | Status: SHIPPED | OUTPATIENT
Start: 2019-12-02 | End: 2020-05-29 | Stop reason: SDUPTHER

## 2020-01-28 RX ORDER — POTASSIUM CITRATE 10 MEQ/1
20 TABLET, EXTENDED RELEASE ORAL 4 TIMES DAILY
Qty: 240 TABLET | Refills: 1 | Status: SHIPPED | OUTPATIENT
Start: 2020-01-28 | End: 2020-05-29

## 2020-03-12 RX ORDER — ALLOPURINOL 100 MG/1
100 TABLET ORAL 2 TIMES DAILY
Qty: 60 TABLET | Refills: 5 | Status: SHIPPED | OUTPATIENT
Start: 2020-03-12 | End: 2020-09-16 | Stop reason: SDUPTHER

## 2020-03-27 NOTE — TELEPHONE ENCOUNTER
CLARA DOMINIQUE IS NEEDING 90DAY REFILL ON THE PRAVASTATIN TO BE SENT TO [facility]...  CALLBACK# [phone]   Impression





- Admit/DC Date/PCP


Admission Date/Primary Care Provider: 


  03/04/20 23:54





  RONALDO WAYNE PA-C





Discharge Date: 03/27/20





- Discharge Diagnosis


(1) Hyponatremia


Is this a current diagnosis for this admission?: Yes   





(2) Acute respiratory failure with hypoxia


Is this a current diagnosis for this admission?: Yes   





(3) Atrial fibrillation with RVR


Is this a current diagnosis for this admission?: Yes   





(4) Atypical pneumonia


Is this a current diagnosis for this admission?: Yes   





(5) COPD exacerbation


Is this a current diagnosis for this admission?: Yes   





(6) Dysphagia


Is this a current diagnosis for this admission?: Yes   





(7) Influenza A


Is this a current diagnosis for this admission?: Yes   





(8) HTN (hypertension)


Is this a current diagnosis for this admission?: Yes   





(9) Dementia


Is this a current diagnosis for this admission?: Yes   





(10) Acute urinary retention


Is this a current diagnosis for this admission?: Yes   





- Assessment


Summary: 


Patient has had prolonged stay in the hospital for the past 23 days but is doing

well at this point for the past few days besides from his hyponatremia which is 

now being controlled.  Detailed summary of patient's stay as below based on 

problems.





(1) Hyponatremia


Is this a current diagnosis for this admission?: Yes   


Plan: 


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


Was on scheduled antipsychotics which was been DC'd


urine osmolarity and urine sodium done consistent with SIADH.  Free T4 and a.m. 

cortisol levels are normal.


Sodium continued to drop despite fluid restriction, Lasix and sodium chloride 

tablets.  Improved with tolvaptan.


Patient has been started on daily tolvaptan by nephrology and will need 

monitoring of sodium levels every week for the next 3 weeks.








(2) Acute respiratory failure with hypoxia


Is this a current diagnosis for this admission?: Yes   


Plan: 


Likely secondary to atypical pneumonia from influenza a and underlying COPD.  

Resolved at this point as patient's SPO2 is in the low 90s on room air and mid 

90s on 1.5 L nasal cannula.








(3) Atrial fibrillation with RVR


Is this a current diagnosis for this admission?: Yes   


Plan: 


Rate controlled. chronic persistent atrial fibrillation. Continue diltiazem, 

digoxin, beta-blockers. Monitor digoxin levels intermittently.


Patient has not been on anticoagulation for over a year.  Likely secondary to 

his falls and feeble state.  I will defer to outpatient cardiologist and 

patient's primary care provider for determination of anticoagulation necessity.








(4) Atypical pneumonia


Is this a current diagnosis for this admission?: Yes   


Plan: 


Likely bacterial infection followed Influenza. Sputum culture on admission 

positive for Streptococcus species.


Received complete dose duration of ceftriaxone & doxycycline.  Also initially 

received vancomycin.


Influenza A also positive.








(5) COPD exacerbation


Is this a current diagnosis for this admission?: Yes   


Plan: 


Acute exacerbation has resolved.  Nebs as needed.








(6) Dysphagia


Qualifiers: 


   Dysphagia type: oropharyngeal phase   Qualified Code(s): R13.12 - Dysphagia, 

oropharyngeal phase   


Is this a current diagnosis for this admission?: Yes   


Plan: 


Status post modified barium swallow study. Mechanical soft diet recommended.








(7) Influenza A


Is this a current diagnosis for this admission?: Yes   


Plan: 


Patient tested positive for influenza A on admission and completed a course of 

Tamiflu.








(8) HTN (hypertension)


Qualifiers: 


   Hypertension type: essential hypertension   Qualified Code(s): I10 - 

Essential (primary) hypertension   


Is this a current diagnosis for this admission?: Yes   


Plan: 


Initial Home medications are: captopril 50 mg p.o. twice daily, diltiazem 120 mg

p.o. daily, Lasix 20 mg p.o. twice daily, metoprolol 25 mg p.o. daily.





Switched captopril to lisinopril for ease of administration.


Continue diltiazem, lisinopril, metoprolol.  Lasix discontinued due to 

hyponatremia.


Adjust meds as needed.  Outpatient PCP follow-up.





(9) Dementia


Often mumbles and talks to himself.  Redirection.  Patient ultimately will need 

supervision and assistance with eating otherwise  would not eat much.  Trial of 

Megace for appetite stimulation.





(10) Acute urinary retention


Noted to have urinary retention several times during hospitalization and as such

patient will be discharged with a Ball to follow-up with a urologist in the 

outpatient setting.  I have started patient on Flomax.  Please attempt voiding 

trial in about 3 days.








- Additional Information


Resuscitation Status: Do Not Resuscitate


Discharge Diet: Cardiac


Discharge Activity: Activity As Tolerated, Balance Activity w/Rest, Weigh Daily


Referrals: 


RONALDO WAYNE PA-C [Primary Care Provider] - 04/03/20 2:00 pm


CHERYLE LEZAMA MD [ACTIVE STAFF] - 


Home Medications: 








Albuterol Sulfate [Proair Hfa Inhalation Aerosol 8.5 gm Mdi] 2 puff IH Q4HP PRN 

03/05/20 


Ascorbic Acid [Vitamin C] 1,000 mg PO DAILY@0800 03/05/20 


Atorvastatin Calcium [Lipitor 40 mg Tablet] 40 mg PO QHS 03/05/20 


Captopril 50 mg PO BID@0800,2000 03/05/20 


Cetirizine HCl [Zyrtec 10 mg Tablet] 10 mg PO HSP PRN 03/05/20 


Digoxin 0.125 mg PO DAILY@1600 03/05/20 


Diltiazem HCl [Diltiazem 24Hr ER (Cd)] 120 mg PO DAILY@0800 03/05/20 


Dutasteride 0.5 mg PO DAILY@0800 03/05/20 


Ferrous Sulfate [Feosol 325 mg Tablet] 325 mg PO DAILY@1200 03/05/20 


Furosemide [Lasix 40 mg Tablet] 20 mg PO BID@0800,1600 03/05/20 


Meloxicam [Mobic 15 mg Tablet] 7.5 mg PO BID@0800,1600 03/05/20 


Mesalamine [Pentasa] 1,000 mg PO QID 03/05/20 


Metoprolol Succinate [Toprol Xl 25 mg Tab.sr] 25 mg PO DAILY@0800 03/05/20 


Pantoprazole Sodium 40 mg PO DAILY@0800 03/05/20 


Potassium Chloride 20 meq PO DAILY@0800 03/05/20 


Silver Sulfadiazine [Ssd] 1 applic TOP BIDP PRN 03/05/20 


Tamsulosin HCl [Flomax] 0.4 mg PO QHS 03/05/20 











History of Present Illiness


History of Present Illness: 


LUIS F VELA is a 84 year old male








Physical Exam


Vital Signs: 


                                        











Temp Pulse Resp BP Pulse Ox


 


 97.9 F   90   16   124/50 L  97 


 


 03/27/20 11:25  03/27/20 11:25  03/27/20 11:25  03/27/20 11:25  03/27/20 11:25








                                 Intake & Output











 03/26/20 03/27/20 03/28/20





 06:59 06:59 06:59


 


Intake Total 1001 2100 440


 


Output Total 3000 950 100


 


Balance -1999 1150 340


 


Weight 57.6 kg 58.8 kg 











General appearance: PRESENT: no acute distress, cooperative


Neck exam: ABSENT: JVD


Respiratory exam: PRESENT: clear to auscultation say, unlabored.  ABSENT: 

tachypnea, wheezes


Cardiovascular exam: PRESENT: RRR, +S1, +S2.  ABSENT: tachycardia


GI/Abdominal exam: PRESENT: soft.  ABSENT: rebound, rigid, tenderness


Gentrourinary exam: PRESENT: indwelling catheter


Neurological exam: PRESENT: alert, awake, oriented to person, oriented to place.

  ABSENT: oriented to time, oriented to situation





Results


Laboratory Results: 


                                        











WBC  7.0 10^3/uL (4.0-10.5)   03/27/20  04:51    


 


RBC  3.19 10^6/uL (4.35-5.55)  L  03/27/20  04:51    


 


Hgb  9.3 g/dL (13.5-17.0)  L  03/27/20  04:51    


 


Hct  25.6 % (37.9-51.0)  L  03/27/20  04:51    


 


MCV  80 fl (80-97)   03/27/20  04:51    


 


MCH  29.1 pg (27.0-33.4)   03/27/20  04:51    


 


MCHC  36.2 g/dL (32.0-36.0)  H  03/27/20  04:51    


 


RDW  16.3 % (11.5-14.0)  H  03/27/20  04:51    


 


Plt Count  129 10^3/uL (150-450)  L  03/27/20  04:51    


 


Lymph % (Auto)  12.2 % (13-45)  L  03/27/20  04:51    


 


Mono % (Auto)  6.6 % (3-13)   03/27/20  04:51    


 


Eos % (Auto)  0.7 % (0-6)   03/27/20  04:51    


 


Baso % (Auto)  0.3 % (0-2)   03/27/20  04:51    


 


Reticulocyte #  0.105 10^6/uL (0.028-0.122)   03/25/20  12:55    


 


Absolute Neuts (auto)  5.6 10^3/uL (1.7-8.2)   03/27/20  04:51    


 


Absolute Lymphs (auto)  0.8 10^3/uL (0.5-4.7)   03/27/20  04:51    


 


Absolute Monos (auto)  0.5 10^3/uL (0.1-1.4)   03/27/20  04:51    


 


Absolute Eos (auto)  0.0 10^3/uL (0.0-0.6)   03/27/20  04:51    


 


Absolute Basos (auto)  0.0 10^3/uL (0.0-0.2)   03/27/20  04:51    


 


Total Counted  100   03/20/20  04:25    


 


Seg Neutrophils %  80.2 % (42-78)  H  03/27/20  04:51    


 


Seg Neuts % (Manual)  94 % (42-78)  H  03/20/20  04:25    


 


Band Neutrophils %  2 % (3-5)  L  03/20/20  04:25    


 


Lymphocytes % (Manual)  3 % (13-45)  L  03/20/20  04:25    


 


Atypical Lymphs %  1 % (0)   03/13/20  05:53    


 


Monocytes % (Manual)  1 % (3-13)  L  03/20/20  04:25    


 


Eosinophils % (Manual)  0 % (0-6)   03/20/20  04:25    


 


Basophils % (Manual)  0 % (0-2)   03/20/20  04:25    


 


Myelocytes %  Not Reportable   03/12/20  08:33    


 


Abs Neuts (Manual)  8.5 10^3/uL (1.7-8.2)  H  03/20/20  04:25    


 


Abs Lymphs (Manual)  0.3 10^3/uL (0.5-4.7)  L  03/20/20  04:25    


 


Abs Monocytes (Manual)  0.1 10^3/uL (0.1-1.4)   03/20/20  04:25    


 


Absolute Eos (Manual)  0.0 10^3/uL (0.0-0.6)   03/20/20  04:25    


 


Abs Basophils (Manual)  0.0 10^3/uL (0.0-0.2)   03/20/20  04:25    


 


Nucleated RBCs  0 /100 WBC (0)   03/20/20  04:25    


 


Hypersegmented Neuts  PRESENT   03/18/20  04:08    


 


Toxic Granulation  SLIGHT   03/20/20  04:25    


 


Toxic Vacuolation  PRESENT   03/17/20  05:55    


 


Platelet Estimate  Cancelled   03/06/20  04:41    


 


Clumped Platelets  PRESENT   03/11/20  05:51    


 


Large Platelets  PRESENT   03/09/20  14:32    


 


Giant Platelets  PRESENT   03/09/20  14:32    


 


Platelet Comment  ADEQUATE   03/20/20  04:25    


 


Polychromasia  1+   03/15/20  05:16    


 


Hypochromasia  SLIGHT   03/06/20  07:30    


 


Poikilocytosis  SLIGHT   03/20/20  04:25    


 


Anisocytosis  SLIGHT   03/20/20  04:25    


 


Ovalocytes  SLIGHT   03/20/20  04:25    


 


[place] Cells  1+   03/20/20  04:25    


 


Schistocytes  SLIGHT   03/20/20  04:25    


 


Retic Count (auto)  3.17 % (0.66-2.85)  H  03/25/20  12:55    


 


PT  16.6 SEC (11.4-15.4)  H  03/11/20  05:51    


 


INR  1.34   03/11/20  05:51    


 


APTT  32.5 SEC (23.5-35.8)   03/09/20  14:32    


 


Carbonic Acid  1.19 mmol/L (1.05-1.35)   03/08/20  06:20    


 


HCO3/H2CO3 Ratio  23:1   03/08/20  06:20    


 


ABG pH  7.46  (7.35-7.45)  H  03/08/20  06:20    


 


ABG pCO2  39.4 mmHg (35-45)   03/08/20  06:20    


 


ABG pO2  86.0 mmHg ()   03/08/20  06:20    


 


ABG HCO3  27.5 mmol/L (20-24)  H  03/08/20  06:20    


 


ABG Total CO2  28.7 mmol/L (23-27)  H  03/08/20  06:20    


 


ABG O2 Saturation  97.0 % (94-98)   03/08/20  06:20    


 


ABG Base Excess  3.5 mmol/L  03/08/20  06:20    


 


VBG pH  7.29  (7.30-7.42)  L  03/04/20  21:51    


 


VBG pCO2  46.8 mmHg (35-63)   03/04/20  21:51    


 


VBG HCO3  22.2 mmol/L (20-32)   03/04/20  21:51    


 


VBG Base Excess  -4.5 mmol/L  03/04/20  21:51    


 


FiO2  40%   03/08/20  06:20    


 


Sodium  128.3 mmol/L (137-145)  L  03/27/20  04:51    


 


Potassium  4.1 mmol/L (3.6-5.0)   03/27/20  04:51    


 


Chloride  98 mmol/L ()   03/27/20  04:51    


 


Carbon Dioxide  24 mmol/L (22-30)   03/27/20  04:51    


 


Anion Gap  6  (5-19)   03/27/20  04:51    


 


BUN  14 mg/dL (7-20)   03/27/20  04:51    


 


Creatinine  0.46 mg/dL (0.52-1.25)  L  03/27/20  04:51    


 


Est GFR ( Amer)  > 60  (>60)   03/27/20  04:51    


 


Est GFR (Non-Af Amer)  Cancelled   03/17/20  05:55    


 


Est GFR (MDRD) Non-Af  > 60  (>60)   03/27/20  04:51    


 


Glucose  107 mg/dL ()   03/27/20  04:51    


 


POC Glucose  128 mg/dL ()  H  03/12/20  05:42    


 


Serum Osmolality  264 mOsm/kg (275-301)  L  03/25/20  04:44    


 


Lactic Acid  1.0 mmol/L (0.7-2.1)   03/08/20  06:18    


 


Calcium  7.2 mg/dL (8.4-10.2)  L  03/27/20  04:51    


 


Phosphorus  3.9 mg/dL (2.5-4.5)   03/06/20  04:41    


 


Magnesium  2.0 mg/dL (1.6-2.3)   03/21/20  04:51    


 


Iron  29.6 ug/dL ()  L  03/25/20  12:55    


 


TIBC  212 ug/dL (250-450)  L  03/25/20  12:55    


 


% Saturation  14 %  03/25/20  12:55    


 


Total Bilirubin  0.8 mg/dL (0.2-1.3)   03/13/20  05:53    


 


Direct Bilirubin  0.2 mg/dL (0.0-0.4)   03/13/20  05:53    


 


Ferritin  205.00 ng/mL (17.9-464.0)   03/25/20  12:55    


 


Neonat Total Bilirubin  Not Reportable   03/13/20  05:53    


 


Neonat Direct Bilirubin  Not Reportable   03/13/20  05:53    


 


Neonat Indirect Bili  Not Reportable   03/13/20  05:53    


 


AST  30 U/L (17-59)   03/13/20  05:53    


 


ALT  23 U/L (<50)   03/13/20  05:53    


 


Alkaline Phosphatase  61 U/L ()   03/13/20  05:53    


 


Creatine Kinase  128 U/L ()   03/04/20  21:51    


 


Troponin I  0.152 ng/mL  03/05/20  00:20    


 


NT-Pro-B Natriuret Pep  5250 pg/mL (<450)  H  03/07/20  13:37    


 


Total Protein  5.5 g/dL (6.3-8.2)  L  03/13/20  05:53    


 


Albumin  1.9 g/dL (3.5-5.0)  L  03/24/20  04:47    


 


EGFR   Cancelled   03/17/20  05:55    


 


Prostate Specific Ag  1.400 ng/mL (<4.00)   03/25/20  12:55    


 


Vitamin B12  982.0 pg/mL (239-931)  H  03/25/20  12:55    


 


Folate  5.24 ng/mL (>2.76)   03/25/20  12:55    


 


TSH  0.26 uIU/mL (0.47-4.68)  L  03/06/20  04:41    


 


Free T4  1.27 ng/dL (0.78-2.19)   03/06/20  04:41    


 


Free T3 pg/mL  2.85 pg/mL (2.77-5.27)   03/06/20  04:41    


 


Cortisol AM Sample  12.80 ug/dL (4.46-22.7)   03/24/20  04:47    


 


Urine Color  YELLOW   03/25/20  06:40    


 


Urine Appearance  CLEAR   03/25/20  06:40    


 


Urine pH  8.0  (5.0-9.0)   03/25/20  06:40    


 


Ur Specific Gravity  1.014   03/25/20  06:40    


 


Urine Protein  NEGATIVE mg/dL (NEGATIVE)   03/25/20  06:40    


 


Urine Glucose (UA)  NEGATIVE mg/dL (NEGATIVE)   03/25/20  06:40    


 


Urine Ketones  NEGATIVE mg/dL (NEGATIVE)   03/25/20  06:40    


 


Urine Blood  SMALL  (NEGATIVE)  H  03/25/20  06:40    


 


Urine Nitrite  NEGATIVE  (NEGATIVE)   03/25/20  06:40    


 


Urine Bilirubin  NEGATIVE  (NEGATIVE)   03/25/20  06:40    


 


Urine Urobilinogen  2.0 mg/dL (<2.0)  H  03/25/20  06:40    


 


Ur Leukocyte Esterase  NEGATIVE  (NEGATIVE)   03/25/20  06:40    


 


Urine WBC (Auto)  5 /HPF  03/25/20  06:40    


 


U Hyaline Cast (Auto)  4 /LPF  03/09/20  08:25    


 


Urine RBC (Auto)  58 /HPF  03/25/20  06:40    


 


Urine Bacteria (Auto)  TRACE /HPF  03/09/20  08:25    


 


Squamous Epi Cells Auto  <1 /HPF  03/09/20  08:25    


 


Urine Mucus (Auto)  RARE /LPF  03/25/20  06:40    


 


Urine Osmolality  585 mOsm/kg (300-900)   03/25/20  06:40    


 


Urine Creatinine  65.4 mg/dL ()   03/17/20  12:57    


 


Urine Sodium  181 mmol/L (30-90)  H  03/24/20  14:28    


 


Urine Sodium  183 mmol/L (30-90)  H  03/24/20  14:28    


 


Urine Ascorbic Acid  NEGATIVE  (NEGATIVE)   03/25/20  06:40    


 


Stool Occult Blood  NEGATIVE  (NEGATIVE)   03/05/20  02:57    


 


Time Trough Drawn  0555   03/17/20  05:55    


 


Vancomycin Trough  18.4 ug/mL (5.0-20.0)   03/17/20  05:55    


 


Digoxin  1.08 ng/mL (0.8-2.0)   03/18/20  04:08    


 


Influenza A (Rapid)  POSITIVE  (NEGATIVE)   03/04/20  23:08    


 


Influenza B (Rapid)  NEGATIVE  (NEGATIVE)   03/04/20  23:08    


 


Slides for Path Review  Cancelled   03/06/20  04:41    








                                        











  03/04/20 03/05/20 03/07/20





  21:51 00:20 13:37


 


Troponin I  0.057  0.152 


 


NT-Pro-B Natriuret Pep  5170 H   5250 H











Impressions: 


                                        





Chest X-Ray  03/04/20 22:01


IMPRESSION:


 


Cardiomegaly with equivocal/mild interstitial edema


 


 


copyright 2011 [employer]- All Rights Reserved


 








Chest X-Ray  03/07/20 00:00


IMPRESSION:  Improved vascular congestion.


 








Modified Barium Swallow  03/12/20 00:00


IMPRESSION:  TRACE LARYNGEAL PENETRATION WITHOUT ASPIRATION. PLEASE SEE SPEECH 

PATHOLOGIST REPORT FOR OTHER FINDINGS AND RECOMMENDATIONS.


 








Chest X-Ray  03/24/20 13:56


IMPRESSION:  Cardiomegaly without ronaldo pulmonary edema.  Cannot exclude 

airspace disease in the right upper lobe or lower lobe, pneumonia versus 

atelectasis.


 














Plan


Time Spent: Greater than 30 Minutes





Stroke


Is this a Stroke Patient?: No





Acute Heart Failure





- **


Is this a Heart Failure Patient?: Yes


Documentation of LVEF assessment?: Yes


LVEF < 40%?: No- if no continue to question #3


3. Anticoagulant therapy for permanect/persistent/paraoxysmal Afib or Aflutter: 

No, document contraindications


Reason(s) not discharged on anticoagulant therapy for 

permanect/persistent/paraoxysmal Afib or Aflutter: Repeated falls/unsteady gait

## 2020-03-31 RX ORDER — ERGOCALCIFEROL 1.25 MG/1
50000 CAPSULE ORAL
Qty: 4 CAPSULE | Refills: 11 | Status: SHIPPED | OUTPATIENT
Start: 2020-03-31 | End: 2021-04-28 | Stop reason: SDUPTHER

## 2020-05-29 RX ORDER — METOPROLOL SUCCINATE 25 MG/1
25 TABLET, EXTENDED RELEASE ORAL DAILY
Qty: 30 TABLET | Refills: 5 | Status: SHIPPED | OUTPATIENT
Start: 2020-05-29 | End: 2020-12-01 | Stop reason: SDUPTHER

## 2020-05-29 RX ORDER — HYDROXYCHLOROQUINE SULFATE 200 MG/1
400 TABLET, FILM COATED ORAL DAILY
Status: CANCELLED | OUTPATIENT
Start: 2020-05-29

## 2020-05-29 RX ORDER — HYDROXYCHLOROQUINE SULFATE 200 MG/1
200 TABLET, FILM COATED ORAL
Qty: 180 TABLET | Refills: 1 | Status: CANCELLED | OUTPATIENT
Start: 2020-05-29

## 2020-05-29 RX ORDER — POTASSIUM CITRATE 10 MEQ/1
20 TABLET, EXTENDED RELEASE ORAL 4 TIMES DAILY
Qty: 240 TABLET | Refills: 1 | Status: SHIPPED | OUTPATIENT
Start: 2020-05-29 | End: 2020-10-05 | Stop reason: SDUPTHER

## 2020-05-29 RX ORDER — HYDROXYCHLOROQUINE SULFATE 200 MG/1
200 TABLET, FILM COATED ORAL 2 TIMES DAILY
Qty: 180 TABLET | Refills: 3 | Status: SHIPPED | OUTPATIENT
Start: 2020-05-29 | End: 2021-07-30 | Stop reason: SDUPTHER

## 2020-07-15 RX ORDER — VALSARTAN AND HYDROCHLOROTHIAZIDE 320; 25 MG/1; MG/1
1 TABLET, FILM COATED ORAL DAILY
Qty: 90 TABLET | Refills: 0 | Status: SHIPPED | OUTPATIENT
Start: 2020-07-15 | End: 2020-10-14 | Stop reason: SDUPTHER

## 2020-07-30 RX ORDER — AMLODIPINE BESYLATE 10 MG/1
10 TABLET ORAL DAILY
Qty: 90 TABLET | Refills: 3 | Status: SHIPPED | OUTPATIENT
Start: 2020-07-30 | End: 2021-07-30 | Stop reason: SDUPTHER

## 2020-09-16 RX ORDER — ALLOPURINOL 100 MG/1
100 TABLET ORAL 2 TIMES DAILY
Qty: 60 TABLET | Refills: 0 | Status: SHIPPED | OUTPATIENT
Start: 2020-09-16 | End: 2020-10-14 | Stop reason: SDUPTHER

## 2020-10-05 RX ORDER — POTASSIUM CITRATE 10 MEQ/1
20 TABLET, EXTENDED RELEASE ORAL 4 TIMES DAILY
Qty: 240 TABLET | Refills: 1 | Status: SHIPPED | OUTPATIENT
Start: 2020-10-05 | End: 2021-02-08 | Stop reason: SDUPTHER

## 2020-10-14 RX ORDER — VALSARTAN AND HYDROCHLOROTHIAZIDE 320; 25 MG/1; MG/1
1 TABLET, FILM COATED ORAL DAILY
Qty: 90 TABLET | Refills: 0 | Status: SHIPPED | OUTPATIENT
Start: 2020-10-14 | End: 2020-11-20 | Stop reason: SDUPTHER

## 2020-10-14 RX ORDER — ALLOPURINOL 100 MG/1
100 TABLET ORAL 2 TIMES DAILY
Qty: 60 TABLET | Refills: 0 | Status: SHIPPED | OUTPATIENT
Start: 2020-10-14 | End: 2020-11-16 | Stop reason: SDUPTHER

## 2020-11-16 RX ORDER — ALLOPURINOL 100 MG/1
100 TABLET ORAL 2 TIMES DAILY
Qty: 60 TABLET | Refills: 0 | Status: SHIPPED | OUTPATIENT
Start: 2020-11-16 | End: 2020-11-20 | Stop reason: SDUPTHER

## 2020-11-16 NOTE — TELEPHONE ENCOUNTER
Last OV 09/18/2019    Next [facility] DOXY CALL 11/20/2020 @ 1:45 pm  1 year nahid & refill meds. Did not want to come in due to COVID

## 2020-11-20 ENCOUNTER — TELEMEDICINE (OUTPATIENT)
Dept: FAMILY MEDICINE CLINIC | Facility: CLINIC | Age: 70
End: 2020-11-20

## 2020-11-20 VITALS — OXYGEN SATURATION: 98 % | DIASTOLIC BLOOD PRESSURE: 80 MMHG | SYSTOLIC BLOOD PRESSURE: 130 MMHG | HEART RATE: 68 BPM

## 2020-11-20 DIAGNOSIS — R73.09 BLOOD SUGAR INCREASED: ICD-10-CM

## 2020-11-20 DIAGNOSIS — Z12.5 PROSTATE CANCER SCREENING: ICD-10-CM

## 2020-11-20 DIAGNOSIS — R53.81 MALAISE AND FATIGUE: ICD-10-CM

## 2020-11-20 DIAGNOSIS — I10 ESSENTIAL HYPERTENSION: Primary | ICD-10-CM

## 2020-11-20 DIAGNOSIS — R53.83 MALAISE AND FATIGUE: ICD-10-CM

## 2020-11-20 DIAGNOSIS — E78.2 MIXED HYPERLIPIDEMIA: ICD-10-CM

## 2020-11-20 PROCEDURE — 99442 PR PHYS/QHP TELEPHONE EVALUATION 11-20 MIN: CPT | Performed by: NURSE PRACTITIONER

## 2020-11-20 RX ORDER — VALSARTAN AND HYDROCHLOROTHIAZIDE 320; 25 MG/1; MG/1
1 TABLET, FILM COATED ORAL DAILY
Qty: 90 TABLET | Refills: 3 | Status: SHIPPED | OUTPATIENT
Start: 2020-11-20 | End: 2021-10-14 | Stop reason: SDUPTHER

## 2020-11-20 RX ORDER — POTASSIUM CITRATE 10 MEQ/1
20 TABLET, EXTENDED RELEASE ORAL 4 TIMES DAILY
Qty: 240 TABLET | Refills: 1 | Status: CANCELLED | OUTPATIENT
Start: 2020-11-20

## 2020-11-20 RX ORDER — METOPROLOL SUCCINATE 25 MG/1
25 TABLET, EXTENDED RELEASE ORAL DAILY
Qty: 30 TABLET | Refills: 5 | Status: CANCELLED | OUTPATIENT
Start: 2020-11-20

## 2020-11-20 RX ORDER — ERGOCALCIFEROL 1.25 MG/1
50000 CAPSULE ORAL
Qty: 4 CAPSULE | Refills: 11 | Status: CANCELLED | OUTPATIENT
Start: 2020-11-20

## 2020-11-20 RX ORDER — AMLODIPINE BESYLATE 10 MG/1
10 TABLET ORAL DAILY
Qty: 90 TABLET | Refills: 3 | Status: CANCELLED | OUTPATIENT
Start: 2020-11-20

## 2020-11-20 RX ORDER — ALLOPURINOL 100 MG/1
100 TABLET ORAL 2 TIMES DAILY
Qty: 180 TABLET | Refills: 3 | Status: SHIPPED | OUTPATIENT
Start: 2020-11-20 | End: 2021-09-08 | Stop reason: SDUPTHER

## 2020-11-20 NOTE — PROGRESS NOTES
No chief complaint on file.    Subjective   Henrik Sands is a 70 y.o. male.     Presents with telephone visit for refills of meds-doing well at this time, no major complaints noted or reported -staying home due to covid     Hypertension  This is a chronic problem. The current episode started more than 1 month ago. The problem has been gradually worsening since onset. The problem is controlled. Associated symptoms include headaches, malaise/fatigue and neck pain. Pertinent negatives include no anxiety, blurred vision, chest pain, orthopnea, palpitations, peripheral edema, PND, shortness of breath or sweats. Risk factors for coronary artery disease include dyslipidemia, male gender and sedentary lifestyle. Past treatments include ACE inhibitors, calcium channel blockers and beta blockers. Current antihypertension treatment includes alpha 1 blockers. The current treatment provides mild improvement. Compliance problems include diet.  Hypertensive end-organ damage includes angina. There is no history of kidney disease, CAD/MI, CVA, heart failure, left ventricular hypertrophy, PVD or retinopathy. There is no history of chronic renal disease, coarctation of the aorta, hyperaldosteronism, hypercortisolism, hyperparathyroidism, a hypertension causing med, pheochromocytoma, renovascular disease, sleep apnea or a thyroid problem.   Hyperlipidemia  This is a chronic problem. The problem is controlled. Recent lipid tests were reviewed and are normal. He has no history of chronic renal disease, diabetes, hypothyroidism, liver disease, obesity or nephrotic syndrome. Associated symptoms include myalgias. Pertinent negatives include no chest pain or shortness of breath. The current treatment provides mild improvement of lipids. Compliance problems include adherence to diet.  Risk factors for coronary artery disease include dyslipidemia and hypertension.   Fatigue  This is a recurrent problem. The current episode started 1  to 4 weeks ago. The problem occurs every several days. The problem has been gradually worsening. Associated symptoms include arthralgias, fatigue, headaches, joint swelling, myalgias, neck pain and a visual change. Pertinent negatives include no abdominal pain, anorexia, change in bowel habit, chest pain, chills, congestion, coughing, diaphoresis, fever, nausea, numbness, rash, sore throat, swollen glands, urinary symptoms, vertigo, vomiting or weakness. The treatment provided mild relief.   Loss of Vision  This is a chronic problem. The current episode started more than 1 year ago. The problem occurs constantly. The problem has been unchanged. Associated symptoms include arthralgias, fatigue, headaches, joint swelling, myalgias, neck pain and a visual change. Pertinent negatives include no abdominal pain, anorexia, change in bowel habit, chest pain, chills, congestion, coughing, diaphoresis, fever, nausea, numbness, rash, sore throat, swollen glands, urinary symptoms, vertigo, vomiting or weakness. The treatment provided no relief.        The following portions of the patient's history were reviewed and updated as appropriate: allergies, current medications, past social history and problem list.    Review of Systems   Constitutional: Positive for activity change, appetite change, fatigue, malaise/fatigue and unexpected weight change. Negative for chills, diaphoresis and fever.   HENT: Positive for hearing loss. Negative for congestion, dental problem, drooling, ear discharge, sinus pressure, sneezing, sore throat, tinnitus, trouble swallowing and voice change.         Hard of hearing    Eyes: Positive for visual disturbance. Negative for blurred vision, pain, discharge, redness and itching.        Chronic vision loss -opic neuropathy-followed by Dr Wei   Respiratory: Negative.  Negative for apnea, cough, choking, chest tightness, shortness of breath, wheezing and stridor.    Cardiovascular: Negative.   Negative for chest pain, palpitations, orthopnea, leg swelling and PND.   Gastrointestinal: Negative.  Negative for abdominal distention, abdominal pain, anal bleeding, anorexia, change in bowel habit, nausea and vomiting.   Endocrine: Positive for cold intolerance. Negative for heat intolerance, polydipsia, polyphagia and polyuria.   Genitourinary: Positive for frequency. Negative for decreased urine volume.   Musculoskeletal: Positive for arthralgias, back pain, gait problem, joint swelling, myalgias, neck pain and neck stiffness.   Skin: Negative.  Negative for color change, pallor and rash.   Allergic/Immunologic: Negative.  Negative for environmental allergies, food allergies and immunocompromised state.   Neurological: Positive for syncope and headaches. Negative for dizziness, vertigo, tremors, seizures, facial asymmetry, speech difficulty, weakness, light-headedness and numbness.   Hematological: Negative.  Negative for adenopathy. Does not bruise/bleed easily.   Psychiatric/Behavioral: Positive for decreased concentration. Negative for agitation, behavioral problems, confusion, dysphoric mood, hallucinations, self-injury, sleep disturbance and suicidal ideas. The patient is nervous/anxious. The patient is not hyperactive.        Objective   /80   Pulse 68   SpO2 98%   Physical Exam  Vitals signs and nursing note reviewed.   Constitutional:       Appearance: Normal appearance. He is obese.      Comments: Limited exam due to telemedicine visit    Neurological:      Mental Status: He is alert.         Assessment/Plan   Problems Addressed this Visit        Cardiovascular and Mediastinum    Essential hypertension - Primary    Relevant Medications    valsartan-hydrochlorothiazide (DIOVAN-HCT) 320-25 MG per tablet    Other Relevant Orders    CBC & Differential    Comprehensive Metabolic Panel    Hemoglobin A1c    Lipid Panel    Vitamin B12    TSH    Magnesium    PSA Screen    Mixed hyperlipidemia     Relevant Orders    CBC & Differential    Comprehensive Metabolic Panel    Hemoglobin A1c    Lipid Panel    Vitamin B12    TSH    Magnesium    PSA Screen      Other Visit Diagnoses     Malaise and fatigue        Relevant Orders    CBC & Differential    Comprehensive Metabolic Panel    Hemoglobin A1c    Lipid Panel    Vitamin B12    TSH    Magnesium    PSA Screen    Blood sugar increased        Relevant Orders    CBC & Differential    Comprehensive Metabolic Panel    Hemoglobin A1c    Lipid Panel    Vitamin B12    TSH    Magnesium    PSA Screen    Prostate cancer screening        Relevant Orders    CBC & Differential    Comprehensive Metabolic Panel    Hemoglobin A1c    Lipid Panel    Vitamin B12    TSH    Magnesium    PSA Screen      Diagnoses       Codes Comments    Essential hypertension    -  Primary ICD-10-CM: I10  ICD-9-CM: 401.9     Mixed hyperlipidemia     ICD-10-CM: E78.2  ICD-9-CM: 272.2     Malaise and fatigue     ICD-10-CM: R53.81, R53.83  ICD-9-CM: 780.79     Blood sugar increased     ICD-10-CM: R73.09  ICD-9-CM: 790.29     Prostate cancer screening     ICD-10-CM: Z12.5  ICD-9-CM: V76.44            New Medications Ordered This Visit   Medications   • allopurinol (ZYLOPRIM) 100 MG tablet     Sig: Take 1 tablet by mouth 2 (Two) Times a Day.     Dispense:  180 tablet     Refill:  3     NEEDS TO BE SEEN BY NOHEMY WHITE FOR ADDITIONAL REFILLS, PAST DUE FOR FOLLOW-UP   • valsartan-hydrochlorothiazide (DIOVAN-HCT) 320-25 MG per tablet     Sig: Take 1 tablet by mouth Daily **Need to make appointment for more refills**     Dispense:  90 tablet     Refill:  3     NEEDS TO BE SEEN FOR ADDITIONAL REFILLS     You have chosen to receive care through a telehealth visit.  Do you consent to use a video/audio connection for your medical care today? Yes    The use of a video visit has been reviewed with the patient verbal informed consent has been obtainedand .      Refill meds, labs as directed, follow up if worsen    You  have chosen to receive care through a telephone visit. Do you consent to use a telephone visit for your medical care today? Yes    This visit has been rescheduled as a phone visit to comply with patient safety concerns in accordance with CDC recommendations. Total time of discussion was 12 minutes.

## 2020-12-01 RX ORDER — METOPROLOL SUCCINATE 25 MG/1
25 TABLET, EXTENDED RELEASE ORAL DAILY
Qty: 30 TABLET | Refills: 5 | Status: SHIPPED | OUTPATIENT
Start: 2020-12-01 | End: 2021-04-28 | Stop reason: SDUPTHER

## 2020-12-02 ENCOUNTER — LAB (OUTPATIENT)
Dept: LAB | Facility: HOSPITAL | Age: 70
End: 2020-12-02

## 2020-12-02 LAB
ALBUMIN SERPL-MCNC: 4.4 G/DL (ref 3.5–5.2)
ALBUMIN/GLOB SERPL: 1.4 G/DL
ALP SERPL-CCNC: 84 U/L (ref 39–117)
ALT SERPL W P-5'-P-CCNC: 13 U/L (ref 1–41)
ANION GAP SERPL CALCULATED.3IONS-SCNC: 7.7 MMOL/L (ref 5–15)
AST SERPL-CCNC: 23 U/L (ref 1–40)
BASOPHILS # BLD AUTO: 0.06 10*3/MM3 (ref 0–0.2)
BASOPHILS NFR BLD AUTO: 1 % (ref 0–1.5)
BILIRUB SERPL-MCNC: 0.9 MG/DL (ref 0–1.2)
BUN SERPL-MCNC: 19 MG/DL (ref 8–23)
BUN/CREAT SERPL: 15.6 (ref 7–25)
CALCIUM SPEC-SCNC: 9.9 MG/DL (ref 8.6–10.5)
CHLORIDE SERPL-SCNC: 100 MMOL/L (ref 98–107)
CHOLEST SERPL-MCNC: 152 MG/DL (ref 0–200)
CO2 SERPL-SCNC: 29.3 MMOL/L (ref 22–29)
CREAT SERPL-MCNC: 1.22 MG/DL (ref 0.76–1.27)
DEPRECATED RDW RBC AUTO: 40.4 FL (ref 37–54)
EOSINOPHIL # BLD AUTO: 0.16 10*3/MM3 (ref 0–0.4)
EOSINOPHIL NFR BLD AUTO: 2.8 % (ref 0.3–6.2)
ERYTHROCYTE [DISTWIDTH] IN BLOOD BY AUTOMATED COUNT: 13.4 % (ref 12.3–15.4)
GFR SERPL CREATININE-BSD FRML MDRD: 58 ML/MIN/1.73
GLOBULIN UR ELPH-MCNC: 3.2 GM/DL
GLUCOSE SERPL-MCNC: 94 MG/DL (ref 65–99)
HBA1C MFR BLD: 5.7 % (ref 4.8–5.6)
HCT VFR BLD AUTO: 48.2 % (ref 37.5–51)
HDLC SERPL-MCNC: 39 MG/DL (ref 40–60)
HGB BLD-MCNC: 16.9 G/DL (ref 13–17.7)
IMM GRANULOCYTES # BLD AUTO: 0.01 10*3/MM3 (ref 0–0.05)
IMM GRANULOCYTES NFR BLD AUTO: 0.2 % (ref 0–0.5)
LDLC SERPL CALC-MCNC: 95 MG/DL (ref 0–100)
LDLC/HDLC SERPL: 2.42 {RATIO}
LYMPHOCYTES # BLD AUTO: 1.44 10*3/MM3 (ref 0.7–3.1)
LYMPHOCYTES NFR BLD AUTO: 25 % (ref 19.6–45.3)
MAGNESIUM SERPL-MCNC: 2 MG/DL (ref 1.6–2.4)
MCH RBC QN AUTO: 29.5 PG (ref 26.6–33)
MCHC RBC AUTO-ENTMCNC: 35.1 G/DL (ref 31.5–35.7)
MCV RBC AUTO: 84.1 FL (ref 79–97)
MONOCYTES # BLD AUTO: 0.6 10*3/MM3 (ref 0.1–0.9)
MONOCYTES NFR BLD AUTO: 10.4 % (ref 5–12)
NEUTROPHILS NFR BLD AUTO: 3.48 10*3/MM3 (ref 1.7–7)
NEUTROPHILS NFR BLD AUTO: 60.6 % (ref 42.7–76)
NRBC BLD AUTO-RTO: 0 /100 WBC (ref 0–0.2)
PLATELET # BLD AUTO: 162 10*3/MM3 (ref 140–450)
PMV BLD AUTO: 10 FL (ref 6–12)
POTASSIUM SERPL-SCNC: 4 MMOL/L (ref 3.5–5.2)
PROT SERPL-MCNC: 7.6 G/DL (ref 6–8.5)
PSA SERPL-MCNC: 0.86 NG/ML (ref 0–4)
RBC # BLD AUTO: 5.73 10*6/MM3 (ref 4.14–5.8)
SODIUM SERPL-SCNC: 137 MMOL/L (ref 136–145)
TRIGL SERPL-MCNC: 94 MG/DL (ref 0–150)
TSH SERPL DL<=0.05 MIU/L-ACNC: 2.94 UIU/ML (ref 0.27–4.2)
VIT B12 BLD-MCNC: 428 PG/ML (ref 211–946)
VLDLC SERPL-MCNC: 18 MG/DL (ref 5–40)
WBC # BLD AUTO: 5.75 10*3/MM3 (ref 3.4–10.8)

## 2020-12-02 PROCEDURE — 83735 ASSAY OF MAGNESIUM: CPT | Performed by: NURSE PRACTITIONER

## 2020-12-02 PROCEDURE — 36415 COLL VENOUS BLD VENIPUNCTURE: CPT | Performed by: NURSE PRACTITIONER

## 2020-12-02 PROCEDURE — 82607 VITAMIN B-12: CPT | Performed by: NURSE PRACTITIONER

## 2020-12-02 PROCEDURE — 83036 HEMOGLOBIN GLYCOSYLATED A1C: CPT | Performed by: NURSE PRACTITIONER

## 2020-12-02 PROCEDURE — 85025 COMPLETE CBC W/AUTO DIFF WBC: CPT | Performed by: NURSE PRACTITIONER

## 2020-12-02 PROCEDURE — G0103 PSA SCREENING: HCPCS | Performed by: NURSE PRACTITIONER

## 2020-12-02 PROCEDURE — 80061 LIPID PANEL: CPT | Performed by: NURSE PRACTITIONER

## 2020-12-02 PROCEDURE — 84443 ASSAY THYROID STIM HORMONE: CPT | Performed by: NURSE PRACTITIONER

## 2020-12-02 PROCEDURE — 80053 COMPREHEN METABOLIC PANEL: CPT | Performed by: NURSE PRACTITIONER

## 2020-12-04 ENCOUNTER — TELEPHONE (OUTPATIENT)
Dept: FAMILY MEDICINE CLINIC | Facility: CLINIC | Age: 70
End: 2020-12-04

## 2020-12-04 NOTE — TELEPHONE ENCOUNTER
Per NOHEMY Roberts, Ms. Sands has been called with recent lab results & recommendations.  Continue current medications and follow-up as planned or sooner if any problems.      ----- Message from NOHEMY Felix sent at 12/3/2020  8:07 AM CST -----  Can you let know labs look good -no changes at this time

## 2021-02-08 RX ORDER — POTASSIUM CITRATE 10 MEQ/1
20 TABLET, EXTENDED RELEASE ORAL 4 TIMES DAILY
Qty: 240 TABLET | Refills: 1 | Status: SHIPPED | OUTPATIENT
Start: 2021-02-08 | End: 2021-07-30 | Stop reason: SDUPTHER

## 2021-04-15 RX ORDER — BLOOD-GLUCOSE METER
KIT MISCELLANEOUS
Qty: 100 EACH | Refills: 12 | Status: SHIPPED | OUTPATIENT
Start: 2021-04-15

## 2021-04-15 RX ORDER — LANCETS 28 GAUGE
EACH MISCELLANEOUS
Qty: 100 EACH | Refills: 12 | Status: SHIPPED | OUTPATIENT
Start: 2021-04-15

## 2021-04-28 RX ORDER — ERGOCALCIFEROL 1.25 MG/1
50000 CAPSULE ORAL
Qty: 4 CAPSULE | Refills: 11 | Status: SHIPPED | OUTPATIENT
Start: 2021-04-28 | End: 2022-06-21 | Stop reason: SDUPTHER

## 2021-04-28 RX ORDER — METOPROLOL SUCCINATE 25 MG/1
25 TABLET, EXTENDED RELEASE ORAL DAILY
Qty: 30 TABLET | Refills: 5 | Status: SHIPPED | OUTPATIENT
Start: 2021-04-28 | End: 2021-08-06 | Stop reason: SDUPTHER

## 2021-05-26 ENCOUNTER — HOSPITAL ENCOUNTER (OUTPATIENT)
Dept: ULTRASOUND IMAGING | Facility: HOSPITAL | Age: 71
Discharge: HOME | End: 2021-05-26
Admitting: NURSE PRACTITIONER

## 2021-05-26 ENCOUNTER — OFFICE VISIT (OUTPATIENT)
Dept: FAMILY MEDICINE CLINIC | Facility: CLINIC | Age: 71
End: 2021-05-26

## 2021-05-26 VITALS
HEIGHT: 69 IN | WEIGHT: 219 LBS | DIASTOLIC BLOOD PRESSURE: 72 MMHG | BODY MASS INDEX: 32.44 KG/M2 | SYSTOLIC BLOOD PRESSURE: 140 MMHG | TEMPERATURE: 97.4 F

## 2021-05-26 DIAGNOSIS — M79.604 LEG PAIN, CENTRAL, RIGHT: ICD-10-CM

## 2021-05-26 DIAGNOSIS — M79.604 LEG PAIN, CENTRAL, RIGHT: Primary | ICD-10-CM

## 2021-05-26 PROCEDURE — 93971 EXTREMITY STUDY: CPT

## 2021-05-26 PROCEDURE — 99214 OFFICE O/P EST MOD 30 MIN: CPT | Performed by: NURSE PRACTITIONER

## 2021-05-26 NOTE — PROGRESS NOTES
Chief Complaint   Patient presents with   • Cyst     inside of right thigh , came up Sun     Subjective   Henrik Sands is a 71 y.o. male.     Cyst  This is a new problem. The problem has been gradually worsening. Associated symptoms include joint swelling. Pertinent negatives include no abdominal pain, anorexia, arthralgias, change in bowel habit, chest pain, chills, congestion, coughing, diaphoresis, fatigue, fever, headaches, nausea, neck pain, numbness, rash, sore throat, swollen glands, urinary symptoms, vertigo, visual change, vomiting or weakness. He has tried acetaminophen and NSAIDs for the symptoms. The treatment provided mild relief.        The following portions of the patient's history were reviewed and updated as appropriate: allergies, current medications, past social history and problem list.    Review of Systems   Constitutional: Positive for activity change and appetite change. Negative for chills, diaphoresis, fatigue, fever and unexpected weight change.   HENT: Positive for hearing loss. Negative for congestion, dental problem, drooling, ear discharge, ear pain, sinus pressure, sneezing, sore throat, tinnitus, trouble swallowing and voice change.         Hard of hearing    Eyes: Positive for visual disturbance. Negative for photophobia, pain, discharge, redness and itching.        Chronic vision loss -opic neuropathy   Respiratory: Negative.  Negative for apnea, cough, choking, chest tightness, wheezing and stridor.    Cardiovascular: Negative.  Negative for chest pain and leg swelling.   Gastrointestinal: Negative.  Negative for abdominal distention, abdominal pain, anal bleeding, anorexia, change in bowel habit, nausea and vomiting.   Endocrine: Positive for cold intolerance. Negative for heat intolerance, polydipsia, polyphagia and polyuria.   Genitourinary: Positive for frequency. Negative for decreased urine volume.   Musculoskeletal: Positive for back pain, gait problem, joint  "swelling and neck stiffness. Negative for arthralgias and neck pain.        Right leg pain    Skin: Negative.  Negative for color change, pallor, rash and wound.        Pain right inner thigh    Allergic/Immunologic: Negative.  Negative for environmental allergies, food allergies and immunocompromised state.   Neurological: Positive for syncope. Negative for dizziness, vertigo, tremors, seizures, facial asymmetry, speech difficulty, weakness, light-headedness, numbness and headaches.   Hematological: Negative.  Negative for adenopathy. Does not bruise/bleed easily.   Psychiatric/Behavioral: Positive for decreased concentration. Negative for agitation, behavioral problems, confusion, dysphoric mood, hallucinations, self-injury, sleep disturbance and suicidal ideas. The patient is nervous/anxious. The patient is not hyperactive.        Objective   /72   Temp 97.4 °F (36.3 °C) (Tympanic)   Ht 175.3 cm (69\")   Wt 99.3 kg (219 lb)   BMI 32.34 kg/m²   Physical Exam  Vitals and nursing note reviewed.   Constitutional:       Appearance: Normal appearance. He is normal weight.   HENT:      Nose: Nose normal.      Mouth/Throat:      Mouth: Mucous membranes are moist.   Cardiovascular:      Rate and Rhythm: Normal rate.      Pulses: Normal pulses.   Pulmonary:      Effort: Pulmonary effort is normal.   Abdominal:      General: Abdomen is flat.   Musculoskeletal:      Cervical back: Normal range of motion.      Comments: Tender area right inner thigh-warm to touch    Neurological:      General: No focal deficit present.      Mental Status: He is alert.         Assessment/Plan   Problems Addressed this Visit     None      Visit Diagnoses     Leg pain, central, right    -  Primary    Relevant Orders    US venous doppler lower extremity right (duplex) (Completed)      Diagnoses       Codes Comments    Leg pain, central, right    -  Primary ICD-10-CM: M79.604  ICD-9-CM: 729.5          No orders of the defined types were " placed in this encounter.        PROCEDURE: US LOWER EXTREMITY VEINS LIMITED/UNILATERAL/FOLLOW UP     CLINICAL HISTORY:   pain, M79.604 Pain in right leg     INDICATION: Same as above.     COMPARISON: None .     TECHNIQUE:      Gray scale imaging with duplex interrogation of the right lower  extremity venous system was performed and multiple static images  were obtained.     FINDINGS:      Utilizing compression and augmentation, there is no deep venous  thrombus in the common femoral, femoral, profunda femoris or  popliteal veins.      The peroneal and the posterior tibial veins are patent and  compressible.       The right greater saphenous veins at its respective  saphenofemoral junction is patent and compressible.     There are no subcutaneous fluid collections.     IMPRESSION:     There is no acute deep venous thrombosis in the imaged deep veins  of the right lower extremity.     Electronically signed by:  Sven Doe MD  5/26/2021 2:14 PM CDT  Workstation: [id number]        Specimen Collected: 05/26/21 13        Warm compresses to area -meds as directed-follow up if worsen, patient agrees

## 2021-07-30 RX ORDER — HYDROXYCHLOROQUINE SULFATE 200 MG/1
200 TABLET, FILM COATED ORAL 2 TIMES DAILY
Qty: 180 TABLET | Refills: 3 | Status: SHIPPED | OUTPATIENT
Start: 2021-07-30 | End: 2022-08-23 | Stop reason: SDUPTHER

## 2021-07-30 RX ORDER — POTASSIUM CITRATE 10 MEQ/1
20 TABLET, EXTENDED RELEASE ORAL 4 TIMES DAILY
Qty: 240 TABLET | Refills: 1 | Status: SHIPPED | OUTPATIENT
Start: 2021-07-30 | End: 2021-10-04 | Stop reason: SDUPTHER

## 2021-07-30 RX ORDER — AMLODIPINE BESYLATE 10 MG/1
10 TABLET ORAL DAILY
Qty: 90 TABLET | Refills: 3 | Status: SHIPPED | OUTPATIENT
Start: 2021-07-30 | End: 2022-10-22 | Stop reason: SDUPTHER

## 2021-07-30 RX ORDER — HYDROXYCHLOROQUINE SULFATE 200 MG/1
200 TABLET, FILM COATED ORAL 2 TIMES DAILY
Qty: 180 TABLET | Refills: 3 | Status: CANCELLED | OUTPATIENT
Start: 2021-07-30 | End: 2022-07-25

## 2021-08-06 RX ORDER — METOPROLOL SUCCINATE 25 MG/1
25 TABLET, EXTENDED RELEASE ORAL DAILY
Qty: 30 TABLET | Refills: 5 | Status: SHIPPED | OUTPATIENT
Start: 2021-08-06 | End: 2022-04-18 | Stop reason: SDUPTHER

## 2021-09-08 RX ORDER — ALLOPURINOL 100 MG/1
100 TABLET ORAL 2 TIMES DAILY
Qty: 180 TABLET | Refills: 0 | Status: SHIPPED | OUTPATIENT
Start: 2021-09-08 | End: 2021-12-10 | Stop reason: SDUPTHER

## 2021-10-05 RX ORDER — POTASSIUM CITRATE 10 MEQ/1
20 TABLET, EXTENDED RELEASE ORAL 4 TIMES DAILY
Qty: 240 TABLET | Refills: 1 | Status: SHIPPED | OUTPATIENT
Start: 2021-10-05 | End: 2022-04-03 | Stop reason: SDUPTHER

## 2021-10-14 RX ORDER — VALSARTAN AND HYDROCHLOROTHIAZIDE 320; 25 MG/1; MG/1
1 TABLET, FILM COATED ORAL DAILY
Qty: 90 TABLET | Refills: 0 | Status: SHIPPED | OUTPATIENT
Start: 2021-10-14 | End: 2022-04-03 | Stop reason: SDUPTHER

## 2021-12-10 RX ORDER — ALLOPURINOL 100 MG/1
100 TABLET ORAL 2 TIMES DAILY
Qty: 180 TABLET | Refills: 0 | Status: SHIPPED | OUTPATIENT
Start: 2021-12-10 | End: 2022-06-06 | Stop reason: SDUPTHER

## 2022-04-04 RX ORDER — POTASSIUM CITRATE 10 MEQ/1
20 TABLET, EXTENDED RELEASE ORAL 4 TIMES DAILY
Qty: 240 TABLET | Refills: 1 | Status: SHIPPED | OUTPATIENT
Start: 2022-04-04 | End: 2022-08-08 | Stop reason: SDUPTHER

## 2022-04-04 RX ORDER — VALSARTAN AND HYDROCHLOROTHIAZIDE 320; 25 MG/1; MG/1
1 TABLET, FILM COATED ORAL DAILY
Qty: 90 TABLET | Refills: 0 | Status: SHIPPED | OUTPATIENT
Start: 2022-04-04 | End: 2022-07-11 | Stop reason: SDUPTHER

## 2022-04-18 RX ORDER — METOPROLOL SUCCINATE 25 MG/1
25 TABLET, EXTENDED RELEASE ORAL DAILY
Qty: 30 TABLET | Refills: 5 | Status: SHIPPED | OUTPATIENT
Start: 2022-04-18 | End: 2022-10-23 | Stop reason: SDUPTHER

## 2022-06-06 ENCOUNTER — TELEPHONE (OUTPATIENT)
Dept: FAMILY MEDICINE CLINIC | Facility: CLINIC | Age: 72
End: 2022-06-06

## 2022-06-06 DIAGNOSIS — I10 ESSENTIAL HYPERTENSION: Primary | ICD-10-CM

## 2022-06-06 DIAGNOSIS — R53.81 MALAISE AND FATIGUE: ICD-10-CM

## 2022-06-06 DIAGNOSIS — E78.2 MIXED HYPERLIPIDEMIA: ICD-10-CM

## 2022-06-06 DIAGNOSIS — R53.83 MALAISE AND FATIGUE: ICD-10-CM

## 2022-06-06 DIAGNOSIS — Z12.5 PROSTATE CANCER SCREENING: ICD-10-CM

## 2022-06-06 RX ORDER — ALLOPURINOL 100 MG/1
100 TABLET ORAL 2 TIMES DAILY
Qty: 180 TABLET | Refills: 0 | Status: SHIPPED | OUTPATIENT
Start: 2022-06-06 | End: 2022-09-06 | Stop reason: SDUPTHER

## 2022-06-10 ENCOUNTER — LAB (OUTPATIENT)
Dept: LAB | Facility: HOSPITAL | Age: 72
End: 2022-06-10

## 2022-06-10 LAB
ALBUMIN SERPL-MCNC: 4.1 G/DL (ref 3.5–5.2)
ALBUMIN/GLOB SERPL: 1.2 G/DL
ALP SERPL-CCNC: 68 U/L (ref 39–117)
ALT SERPL W P-5'-P-CCNC: 14 U/L (ref 1–41)
ANION GAP SERPL CALCULATED.3IONS-SCNC: 11.2 MMOL/L (ref 5–15)
AST SERPL-CCNC: 25 U/L (ref 1–40)
BASOPHILS # BLD AUTO: 0.05 10*3/MM3 (ref 0–0.2)
BASOPHILS NFR BLD AUTO: 0.8 % (ref 0–1.5)
BILIRUB SERPL-MCNC: 0.7 MG/DL (ref 0–1.2)
BUN SERPL-MCNC: 20 MG/DL (ref 8–23)
BUN/CREAT SERPL: 15.4 (ref 7–25)
CALCIUM SPEC-SCNC: 10 MG/DL (ref 8.6–10.5)
CHLORIDE SERPL-SCNC: 102 MMOL/L (ref 98–107)
CHOLEST SERPL-MCNC: 156 MG/DL (ref 0–200)
CO2 SERPL-SCNC: 27.8 MMOL/L (ref 22–29)
CREAT SERPL-MCNC: 1.3 MG/DL (ref 0.76–1.27)
DEPRECATED RDW RBC AUTO: 42.4 FL (ref 37–54)
EGFRCR SERPLBLD CKD-EPI 2021: 57.6 ML/MIN/1.73
EOSINOPHIL # BLD AUTO: 0.18 10*3/MM3 (ref 0–0.4)
EOSINOPHIL NFR BLD AUTO: 2.9 % (ref 0.3–6.2)
ERYTHROCYTE [DISTWIDTH] IN BLOOD BY AUTOMATED COUNT: 13.5 % (ref 12.3–15.4)
GLOBULIN UR ELPH-MCNC: 3.3 GM/DL
GLUCOSE SERPL-MCNC: 99 MG/DL (ref 65–99)
HCT VFR BLD AUTO: 45.2 % (ref 37.5–51)
HDLC SERPL-MCNC: 40 MG/DL (ref 40–60)
HGB BLD-MCNC: 15.4 G/DL (ref 13–17.7)
IMM GRANULOCYTES # BLD AUTO: 0.01 10*3/MM3 (ref 0–0.05)
IMM GRANULOCYTES NFR BLD AUTO: 0.2 % (ref 0–0.5)
LDLC SERPL CALC-MCNC: 102 MG/DL (ref 0–100)
LDLC/HDLC SERPL: 2.55 {RATIO}
LYMPHOCYTES # BLD AUTO: 1.33 10*3/MM3 (ref 0.7–3.1)
LYMPHOCYTES NFR BLD AUTO: 21.3 % (ref 19.6–45.3)
MCH RBC QN AUTO: 29.4 PG (ref 26.6–33)
MCHC RBC AUTO-ENTMCNC: 34.1 G/DL (ref 31.5–35.7)
MCV RBC AUTO: 86.4 FL (ref 79–97)
MONOCYTES # BLD AUTO: 0.67 10*3/MM3 (ref 0.1–0.9)
MONOCYTES NFR BLD AUTO: 10.7 % (ref 5–12)
NEUTROPHILS NFR BLD AUTO: 4 10*3/MM3 (ref 1.7–7)
NEUTROPHILS NFR BLD AUTO: 64.1 % (ref 42.7–76)
NRBC BLD AUTO-RTO: 0 /100 WBC (ref 0–0.2)
PLATELET # BLD AUTO: 218 10*3/MM3 (ref 140–450)
PMV BLD AUTO: 10.3 FL (ref 6–12)
POTASSIUM SERPL-SCNC: 4.7 MMOL/L (ref 3.5–5.2)
PROT SERPL-MCNC: 7.4 G/DL (ref 6–8.5)
PSA SERPL-MCNC: 0.79 NG/ML (ref 0–4)
RBC # BLD AUTO: 5.23 10*6/MM3 (ref 4.14–5.8)
SODIUM SERPL-SCNC: 141 MMOL/L (ref 136–145)
TRIGL SERPL-MCNC: 71 MG/DL (ref 0–150)
TSH SERPL DL<=0.05 MIU/L-ACNC: 2.14 UIU/ML (ref 0.27–4.2)
VIT B12 BLD-MCNC: 437 PG/ML (ref 211–946)
VLDLC SERPL-MCNC: 14 MG/DL (ref 5–40)
WBC NRBC COR # BLD: 6.24 10*3/MM3 (ref 3.4–10.8)

## 2022-06-10 PROCEDURE — 36415 COLL VENOUS BLD VENIPUNCTURE: CPT | Performed by: NURSE PRACTITIONER

## 2022-06-10 PROCEDURE — 85025 COMPLETE CBC W/AUTO DIFF WBC: CPT | Performed by: NURSE PRACTITIONER

## 2022-06-10 PROCEDURE — 82607 VITAMIN B-12: CPT | Performed by: NURSE PRACTITIONER

## 2022-06-10 PROCEDURE — 80053 COMPREHEN METABOLIC PANEL: CPT | Performed by: NURSE PRACTITIONER

## 2022-06-10 PROCEDURE — G0103 PSA SCREENING: HCPCS | Performed by: NURSE PRACTITIONER

## 2022-06-10 PROCEDURE — 84443 ASSAY THYROID STIM HORMONE: CPT | Performed by: NURSE PRACTITIONER

## 2022-06-10 PROCEDURE — 80061 LIPID PANEL: CPT | Performed by: NURSE PRACTITIONER

## 2022-06-21 RX ORDER — ERGOCALCIFEROL 1.25 MG/1
50000 CAPSULE ORAL
Qty: 4 CAPSULE | Refills: 11 | Status: SHIPPED | OUTPATIENT
Start: 2022-06-21

## 2022-06-22 ENCOUNTER — OFFICE VISIT (OUTPATIENT)
Dept: FAMILY MEDICINE CLINIC | Facility: CLINIC | Age: 72
End: 2022-06-22

## 2022-06-22 VITALS
SYSTOLIC BLOOD PRESSURE: 138 MMHG | DIASTOLIC BLOOD PRESSURE: 88 MMHG | HEIGHT: 71 IN | WEIGHT: 217 LBS | BODY MASS INDEX: 30.38 KG/M2

## 2022-06-22 DIAGNOSIS — H46.9 OPTIC NEUROPATHY: ICD-10-CM

## 2022-06-22 DIAGNOSIS — I10 ESSENTIAL HYPERTENSION: Primary | ICD-10-CM

## 2022-06-22 DIAGNOSIS — R53.81 MALAISE AND FATIGUE: ICD-10-CM

## 2022-06-22 DIAGNOSIS — E78.2 MIXED HYPERLIPIDEMIA: ICD-10-CM

## 2022-06-22 DIAGNOSIS — R53.83 MALAISE AND FATIGUE: ICD-10-CM

## 2022-06-22 PROCEDURE — 99214 OFFICE O/P EST MOD 30 MIN: CPT | Performed by: NURSE PRACTITIONER

## 2022-06-22 RX ORDER — ERGOCALCIFEROL 1.25 MG/1
50000 CAPSULE ORAL
Qty: 15 CAPSULE | Refills: 3 | Status: SHIPPED | OUTPATIENT
Start: 2022-06-22 | End: 2022-09-21 | Stop reason: HOSPADM

## 2022-06-22 RX ORDER — ASCORBIC ACID 1000 MG
2000 TABLET, EXTENDED RELEASE ORAL DAILY
COMMUNITY
End: 2022-10-31

## 2022-06-22 NOTE — PROGRESS NOTES
Chief Complaint   Patient presents with   • Yearly check up     Had labs done last week     Subjective   Henrik Sands is a 72 y.o. male.           Presents with recheck of health care needs    Hypertension  This is a chronic problem. The current episode started more than 1 month ago. The problem has been gradually improving since onset. The problem is controlled. Associated symptoms include headaches, malaise/fatigue and neck pain. Pertinent negatives include no anxiety, blurred vision, chest pain, orthopnea, palpitations, peripheral edema, PND, shortness of breath or sweats. Risk factors for coronary artery disease include dyslipidemia, male gender and sedentary lifestyle. Past treatments include ACE inhibitors, calcium channel blockers and beta blockers. Current antihypertension treatment includes alpha 1 blockers. The current treatment provides mild improvement. Compliance problems include diet.  Hypertensive end-organ damage includes angina. There is no history of kidney disease, CAD/MI, CVA, heart failure, left ventricular hypertrophy, PVD or retinopathy. There is no history of chronic renal disease, coarctation of the aorta, hyperaldosteronism, hypercortisolism, hyperparathyroidism, a hypertension causing med, pheochromocytoma, renovascular disease, sleep apnea or a thyroid problem.   Hyperlipidemia  This is a chronic problem. The problem is controlled. Recent lipid tests were reviewed and are normal. He has no history of chronic renal disease, diabetes, hypothyroidism, liver disease, obesity or nephrotic syndrome. Associated symptoms include myalgias. Pertinent negatives include no chest pain or shortness of breath. The current treatment provides mild improvement of lipids. Compliance problems include adherence to diet.  Risk factors for coronary artery disease include dyslipidemia and hypertension.   Fatigue  This is a recurrent problem. The current episode started 1 to 4 weeks ago. The problem  occurs daily. The problem has been resolved. Associated symptoms include arthralgias, fatigue, headaches, joint swelling, myalgias, neck pain and a visual change. Pertinent negatives include no abdominal pain, anorexia, change in bowel habit, chest pain, chills, congestion, coughing, diaphoresis, fever, nausea, numbness, rash, sore throat, swollen glands, urinary symptoms, vertigo, vomiting or weakness. The treatment provided mild relief.   Loss of Vision  This is a chronic problem. The current episode started more than 1 year ago. The problem occurs constantly. The problem has been unchanged. Associated symptoms include arthralgias, fatigue, headaches, joint swelling, myalgias, neck pain and a visual change. Pertinent negatives include no abdominal pain, anorexia, change in bowel habit, chest pain, chills, congestion, coughing, diaphoresis, fever, nausea, numbness, rash, sore throat, swollen glands, urinary symptoms, vertigo, vomiting or weakness. The treatment provided no relief.        The following portions of the patient's history were reviewed and updated as appropriate: allergies, current medications, past social history and problem list.    Review of Systems   Constitutional: Positive for activity change, appetite change, fatigue, malaise/fatigue and unexpected weight change. Negative for chills, diaphoresis and fever.   HENT: Positive for hearing loss. Negative for congestion, dental problem, drooling, ear discharge, ear pain, facial swelling, mouth sores, nosebleeds, postnasal drip, sinus pressure, sneezing, sore throat, tinnitus, trouble swallowing and voice change.         Hard of hearing    Eyes: Positive for visual disturbance. Negative for blurred vision, pain, discharge, redness and itching.        Chronic vision loss -opic neuropathy-followed by [facility]omal[facility]    Respiratory: Negative.  Negative for apnea, cough, choking, chest tightness, shortness of breath, wheezing and stridor.    Cardiovascular:  "Negative.  Negative for chest pain, palpitations, orthopnea, leg swelling and PND.   Gastrointestinal: Negative.  Negative for abdominal distention, abdominal pain, anal bleeding, anorexia, blood in stool, change in bowel habit, constipation, nausea and vomiting.   Endocrine: Positive for cold intolerance. Negative for heat intolerance, polydipsia, polyphagia and polyuria.   Genitourinary: Positive for frequency. Negative for decreased urine volume and difficulty urinating.   Musculoskeletal: Positive for arthralgias, back pain, gait problem, joint swelling, myalgias, neck pain and neck stiffness.   Skin: Negative.  Negative for color change, pallor and rash.   Allergic/Immunologic: Negative.  Negative for environmental allergies, food allergies and immunocompromised state.   Neurological: Positive for syncope and headaches. Negative for dizziness, vertigo, tremors, seizures, facial asymmetry, speech difficulty, weakness, light-headedness and numbness.   Hematological: Negative.  Negative for adenopathy. Does not bruise/bleed easily.   Psychiatric/Behavioral: Positive for decreased concentration. Negative for agitation, behavioral problems, confusion, dysphoric mood, hallucinations, self-injury, sleep disturbance and suicidal ideas. The patient is nervous/anxious. The patient is not hyperactive.        Objective   /88   Ht 180.3 cm (71\")   Wt 98.4 kg (217 lb)   BMI 30.27 kg/m²   Physical Exam  Vitals and nursing note reviewed.   Constitutional:       Appearance: Normal appearance. He is obese.   HENT:      Head: Normocephalic.      Comments: Limited vision     Right Ear: Tympanic membrane normal.      Nose: Nose normal.      Mouth/Throat:      Mouth: Mucous membranes are moist.   Eyes:      Pupils: Pupils are equal, round, and reactive to light.   Cardiovascular:      Rate and Rhythm: Normal rate.      Pulses: Normal pulses.   Pulmonary:      Effort: Pulmonary effort is normal.   Abdominal:      General: " Abdomen is flat.   Musculoskeletal:         General: Normal range of motion.      Cervical back: Normal range of motion.   Skin:     General: Skin is warm.      Coloration: Skin is not jaundiced or pale.      Findings: No bruising or erythema.   Neurological:      General: No focal deficit present.      Mental Status: He is alert and oriented to person, place, and time.   Psychiatric:         Mood and Affect: Mood normal.              Assessment & Plan     Problems Addressed this Visit        Cardiac and Vasculature    Essential hypertension - Primary    Mixed hyperlipidemia      Other Visit Diagnoses     Malaise and fatigue        Optic neuropathy          Diagnoses       Codes Comments    Essential hypertension    -  Primary ICD-10-CM: I10  ICD-9-CM: 401.9     Mixed hyperlipidemia     ICD-10-CM: E78.2  ICD-9-CM: 272.2     Malaise and fatigue     ICD-10-CM: R53.81, R53.83  ICD-9-CM: 780.79     Optic neuropathy     ICD-10-CM: H46.9  ICD-9-CM: 377.39            New Medications Ordered This Visit   Medications   • vitamin D (ERGOCALCIFEROL) 1.25 MG (18608 UT) capsule capsule     Sig: Take 1 capsule by mouth Every 7 (Seven) Days.     Dispense:  15 capsule     Refill:  3   • vitamin E 200 UNIT capsule     Sig: Take 1 capsule by mouth Daily.     Dispense:  30 capsule     Refill:  11     Current Outpatient Medications on File Prior to Visit   Medication Sig Dispense Refill   • albuterol (PROVENTIL HFA;VENTOLIN HFA) 108 (90 Base) MCG/ACT inhaler Inhale 2 puffs Every 4 (Four) Hours As Needed for Wheezing. 18 g 11   • allopurinol (ZYLOPRIM) 100 MG tablet Take 1 tablet by mouth 2 (Two) Times a Day. 180 tablet 0   • amLODIPine (NORVASC) 10 MG tablet Take 1 tablet by mouth Daily. 90 tablet 3   • Ascorbic Acid (Vitamin C ER) 1000 MG tablet controlled-release Take 2,000 mg by mouth Daily.     • glucose blood (FREESTYLE LITE) test strip Use to test blood sugar up to 3 times daily **Freestyle Lite** (Patient taking differently: Use  to test blood sugar up to 3 times daily **Freestyle Lite**) 100 each 12   • hydroxychloroquine (PLAQUENIL) 200 MG tablet Take 1 tablet by mouth 2 (Two) Times a Day 180 tablet 3   • Lancets (freestyle) lancets Use to test blood sugar up to 3 times daily **Freestyle** (Patient taking differently: Use to test blood sugar up to 3 times daily **Freestyle**) 100 each 12   • metoprolol succinate XL (TOPROL-XL) 25 MG 24 hr tablet Take 1 tablet by mouth Daily. 30 tablet 5   • potassium citrate (UROCIT-K) 10 MEQ (1080 MG) CR tablet Take 2 tablets by mouth 4 (Four) Times a Day for hypokalemia. 240 tablet 1   • valsartan-hydrochlorothiazide (DIOVAN-HCT) 320-25 MG per tablet Take 1 tablet by mouth Daily **Need to make appointment for more refills** 90 tablet 0   • vitamin D (ERGOCALCIFEROL) 1.25 MG (20916 UT) capsule capsule Take 1 capsule by mouth Every 7 (Seven) Days. 4 capsule 11   • [DISCONTINUED] raNITIdine (ZANTAC) 150 MG tablet Take 150 mg by mouth 2 (Two) Times a Day.       No current facility-administered medications on file prior to visit.       19 minutes   Follow Up   Return in about 6 weeks (around 8/3/2022).        It's not just what you eat, but when you eat   Eat breakfast, and eat smaller meals throughout the day. A healthy breakfast can jumpstart your metabolism, while eating small, healthy meals (rather than the standard three large meals) keeps your energy up.   Avoid eating at night. Try to eat dinner earlier and fast for 14-16 hours until breakfast the next morning. Studies suggest that eating only when you’re most active and giving your digestive system a long break each day may help to regulate weight.     meds as directed, labs reviewed, see back in 6months sooner if needed. Refill meds   Schedule medicare wellness in 6 weeks

## 2022-07-09 RX ORDER — VALSARTAN AND HYDROCHLOROTHIAZIDE 320; 25 MG/1; MG/1
1 TABLET, FILM COATED ORAL DAILY
Qty: 90 TABLET | Refills: 0 | Status: CANCELLED | OUTPATIENT
Start: 2022-07-09

## 2022-07-11 RX ORDER — VALSARTAN AND HYDROCHLOROTHIAZIDE 320; 25 MG/1; MG/1
1 TABLET, FILM COATED ORAL DAILY
Qty: 90 TABLET | Refills: 0 | Status: SHIPPED | OUTPATIENT
Start: 2022-07-11 | End: 2022-10-04 | Stop reason: SDUPTHER

## 2022-08-04 ENCOUNTER — OFFICE VISIT (OUTPATIENT)
Dept: FAMILY MEDICINE CLINIC | Facility: CLINIC | Age: 72
End: 2022-08-04

## 2022-08-04 VITALS
HEIGHT: 71 IN | SYSTOLIC BLOOD PRESSURE: 122 MMHG | BODY MASS INDEX: 30.1 KG/M2 | WEIGHT: 215 LBS | HEART RATE: 64 BPM | DIASTOLIC BLOOD PRESSURE: 80 MMHG | OXYGEN SATURATION: 95 %

## 2022-08-04 DIAGNOSIS — Z12.11 ENCOUNTER FOR SCREENING COLONOSCOPY: ICD-10-CM

## 2022-08-04 DIAGNOSIS — Z00.00 MEDICARE ANNUAL WELLNESS VISIT, SUBSEQUENT: Primary | ICD-10-CM

## 2022-08-04 PROCEDURE — G0439 PPPS, SUBSEQ VISIT: HCPCS | Performed by: NURSE PRACTITIONER

## 2022-08-04 PROCEDURE — 1159F MED LIST DOCD IN RCRD: CPT | Performed by: NURSE PRACTITIONER

## 2022-08-04 PROCEDURE — 1170F FXNL STATUS ASSESSED: CPT | Performed by: NURSE PRACTITIONER

## 2022-08-04 PROCEDURE — 1126F AMNT PAIN NOTED NONE PRSNT: CPT | Performed by: NURSE PRACTITIONER

## 2022-08-04 NOTE — PROGRESS NOTES
The ABCs of the Annual Wellness Visit  Subsequent Medicare Wellness Visit    Chief Complaint   Patient presents with   • Medicare Wellness-subsequent      Subjective    History of Present Illness:  Henrik Sands is a 72 y.o. male who presents for a Subsequent Medicare Wellness Visit.    The following portions of the patient's history were reviewed and   updated as appropriate: allergies, current medications, past family history, past medical history, past social history, past surgical history and problem list.    Compared to one year ago, the patient feels his physical   health is the same.    Compared to one year ago, the patient feels his mental   health is the same.    Recent Hospitalizations:  He was not admitted to the hospital during the last year.       Current Medical Providers:  Patient Care Team:  Caron Stinson APRN as PCP - Jason Alba MD as Surgeon (General Surgery)    Outpatient Medications Prior to Visit   Medication Sig Dispense Refill   • albuterol (PROVENTIL HFA;VENTOLIN HFA) 108 (90 Base) MCG/ACT inhaler Inhale 2 puffs Every 4 (Four) Hours As Needed for Wheezing. 18 g 11   • allopurinol (ZYLOPRIM) 100 MG tablet Take 1 tablet by mouth 2 (Two) Times a Day. 180 tablet 0   • amLODIPine (NORVASC) 10 MG tablet Take 1 tablet by mouth Daily. 90 tablet 3   • Ascorbic Acid (Vitamin C ER) 1000 MG tablet controlled-release Take 2,000 mg by mouth Daily.     • glucose blood (FREESTYLE LITE) test strip Use to test blood sugar up to 3 times daily **Freestyle Lite** (Patient taking differently: Use to test blood sugar up to 3 times daily **Freestyle Lite**) 100 each 12   • hydroxychloroquine (PLAQUENIL) 200 MG tablet Take 1 tablet by mouth 2 (Two) Times a Day 180 tablet 3   • Lancets (freestyle) lancets Use to test blood sugar up to 3 times daily **Freestyle** (Patient taking differently: Use to test blood sugar up to 3 times daily **Freestyle**) 100 each 12   • metoprolol  "succinate XL (TOPROL-XL) 25 MG 24 hr tablet Take 1 tablet by mouth Daily. 30 tablet 5   • potassium citrate (UROCIT-K) 10 MEQ (1080 MG) CR tablet Take 2 tablets by mouth 4 (Four) Times a Day for hypokalemia. 240 tablet 1   • valsartan-hydrochlorothiazide (DIOVAN-HCT) 320-25 MG per tablet Take 1 tablet by mouth Daily **Need to make appointment for more refills** 90 tablet 0   • vitamin D (ERGOCALCIFEROL) 1.25 MG (06686 UT) capsule capsule Take 1 capsule by mouth Every 7 (Seven) Days. 4 capsule 11   • vitamin D (ERGOCALCIFEROL) 1.25 MG (96662 UT) capsule capsule Take 1 capsule by mouth Every 7 (Seven) Days. 15 capsule 3   • vitamin E 200 UNIT capsule Take 1 capsule by mouth Daily. 30 capsule 11     No facility-administered medications prior to visit.       No opioid medication identified on active medication list. I have reviewed chart for other potential  high risk medication/s and harmful drug interactions in the elderly.          Aspirin is not on active medication list.  Aspirin use is not indicated based on review of current medical condition/s. Risk of harm outweighs potential benefits.  .    Patient Active Problem List   Diagnosis   • Bruit of left carotid artery   • General medical exam   • Screening for prostate cancer   • Essential hypertension   • Mixed hyperlipidemia   • Malaise   • Dyspnea on exertion   • Chest pain   • Palpitation   • Vasovagal syncope   • Angina pectoris (HCC)   • Blind in both eyes   • Need for vaccination   • Cellulitis and abscess of right leg   • Inguinal pain     Advance Care Planning  Advance Directive is on file.  ACP discussion was held with the patient during this visit. yes           Objective    Vitals:    08/04/22 0823   BP: 122/80   Pulse: 64   SpO2: 95%   Weight: 97.5 kg (215 lb)   Height: 180.3 cm (71\")   PainSc: 0-No pain     Estimated body mass index is 29.99 kg/m² as calculated from the following:    Height as of this encounter: 180.3 cm (71\").    Weight as of this " encounter: 97.5 kg (215 lb).    BMI is >= 30 and <35. (Class 1 Obesity). The following options were offered after discussion;: weight loss educational material (shared in after visit summary)      Does the patient have evidence of cognitive impairment? No    Physical Exam  Lab Results   Component Value Date    TRIG 71 06/10/2022    HDL 40 06/10/2022     (H) 06/10/2022    VLDL 14 06/10/2022            HEALTH RISK ASSESSMENT    Smoking Status:  Social History     Tobacco Use   Smoking Status Never Smoker   Smokeless Tobacco Never Used     Alcohol Consumption:  Social History     Substance and Sexual Activity   Alcohol Use No     Fall Risk Screen:    STEADI Fall Risk Assessment was completed, and patient is at MODERATE risk for falls. Assessment completed on:8/4/2022    Depression Screening:  PHQ-2/PHQ-9 Depression Screening 8/4/2022   Retired Total Score -   Little Interest or Pleasure in Doing Things -   Feeling Down, Depressed or Hopeless 0-->not at all   PHQ-9: Brief Depression Severity Measure Score 0       Health Habits and Functional and Cognitive Screening:  Functional & Cognitive Status 8/4/2022   Do you have difficulty preparing food and eating? No   Do you have difficulty bathing yourself, getting dressed or grooming yourself? No   Do you have difficulty using the toilet? No   Do you have difficulty moving around from place to place? No   Do you have trouble with steps or getting out of a bed or a chair? No   Current Diet Well Balanced Diet   Dental Exam Not up to date   Eye Exam Not up to date   Exercise (times per week) 0 times per week   Current Exercises Include No Regular Exercise   Do you need help using the phone?  No   Are you deaf or do you have serious difficulty hearing?  Yes   Do you need help with transportation? Yes   Do you need help shopping? Yes   Do you need help preparing meals?  Yes   Do you need help with housework?  Yes   Do you need help with laundry? No   Do you need help  taking your medications? No   Do you need help managing money? No   Do you ever drive or ride in a car without wearing a seat belt? No   Have you felt unusual stress, anger or loneliness in the last month? Yes   Who do you live with? Spouse   If you need help, do you have trouble finding someone available to you? No   Have you been bothered in the last four weeks by sexual problems? No   Do you have difficulty concentrating, remembering or making decisions? No       Age-appropriate Screening Schedule:  Refer to the list below for future screening recommendations based on patient's age, sex and/or medical conditions. Orders for these recommended tests are listed in the plan section. The patient has been provided with a written plan.    Health Maintenance   Topic Date Due   • ZOSTER VACCINE (2 of 2) 08/04/2022 (Originally 6/20/2018)   • TDAP/TD VACCINES (1 - Tdap) 06/22/2023 (Originally 1/24/1969)   • INFLUENZA VACCINE  10/01/2022   • LIPID PANEL  06/10/2023   • COLONOSCOPY  02/11/2026              Assessment & Plan   CMS Preventative Services Quick Reference  Risk Factors Identified During Encounter  None Identified  The above risks/problems have been discussed with the patient.  Follow up actions/plans if indicated are seen below in the Assessment/Plan Section.  Pertinent information has been shared with the patient in the After Visit Summary.    Diagnoses and all orders for this visit:    1. Medicare annual wellness visit, subsequent (Primary)    2. Encounter for screening colonoscopy  -     Ambulatory Referral to Gastroenterology        Follow Up:   Return for Next scheduled follow up.     An After Visit Summary and PPPS were made available to the patient.          I spent 15 minutes caring for Henrik on this date of service. This time includes time spent by me in the following activities:performing a medically appropriate examination and/or evaluation     No changes for now     Refer to gastro for updated  colonoscopy

## 2022-08-08 RX ORDER — POTASSIUM CITRATE 10 MEQ/1
20 TABLET, EXTENDED RELEASE ORAL 4 TIMES DAILY
Qty: 240 TABLET | Refills: 1 | Status: SHIPPED | OUTPATIENT
Start: 2022-08-08 | End: 2022-12-09 | Stop reason: SDUPTHER

## 2022-08-22 RX ORDER — HYDROXYCHLOROQUINE SULFATE 200 MG/1
200 TABLET, FILM COATED ORAL 2 TIMES DAILY
Qty: 180 TABLET | Refills: 3 | Status: CANCELLED | OUTPATIENT
Start: 2022-08-22 | End: 2023-08-17

## 2022-08-23 RX ORDER — HYDROXYCHLOROQUINE SULFATE 200 MG/1
200 TABLET, FILM COATED ORAL DAILY
Qty: 90 TABLET | Refills: 3 | Status: SHIPPED | OUTPATIENT
Start: 2022-08-23 | End: 2022-08-26 | Stop reason: DRUGHIGH

## 2022-08-24 ENCOUNTER — OFFICE VISIT (OUTPATIENT)
Dept: GASTROENTEROLOGY | Facility: CLINIC | Age: 72
End: 2022-08-24

## 2022-08-24 VITALS
HEART RATE: 64 BPM | SYSTOLIC BLOOD PRESSURE: 142 MMHG | HEIGHT: 71 IN | WEIGHT: 216 LBS | DIASTOLIC BLOOD PRESSURE: 69 MMHG | BODY MASS INDEX: 30.24 KG/M2

## 2022-08-24 DIAGNOSIS — R19.7 DIARRHEA, UNSPECIFIED TYPE: Primary | ICD-10-CM

## 2022-08-24 DIAGNOSIS — Z86.010 HISTORY OF COLON POLYPS: ICD-10-CM

## 2022-08-24 DIAGNOSIS — R15.9 INCONTINENCE OF FECES WITH FECAL URGENCY: ICD-10-CM

## 2022-08-24 DIAGNOSIS — K58.0 IRRITABLE BOWEL SYNDROME WITH DIARRHEA: ICD-10-CM

## 2022-08-24 DIAGNOSIS — R15.2 INCONTINENCE OF FECES WITH FECAL URGENCY: ICD-10-CM

## 2022-08-24 DIAGNOSIS — Z85.038 HISTORY OF COLON CANCER: ICD-10-CM

## 2022-08-24 PROCEDURE — 99204 OFFICE O/P NEW MOD 45 MIN: CPT | Performed by: PHYSICIAN ASSISTANT

## 2022-08-24 RX ORDER — DEXTROSE AND SODIUM CHLORIDE 5; .45 G/100ML; G/100ML
30 INJECTION, SOLUTION INTRAVENOUS CONTINUOUS PRN
Status: CANCELLED | OUTPATIENT
Start: 2022-09-21

## 2022-08-24 NOTE — PROGRESS NOTES
Chief Complaint   Patient presents with    Colonoscopy     Screening         ENDO PROCEDURE ORDERED: COLON hx colon ca, hx polps,     Subjective    Henrik Sands is a 72 y.o. male. he is being seen for consultation today at the request of NOHEMY Marshall.    History of Present Illness    This 72-year-old retired gentleman was accompanied by his wife. Was sent for evaluation for colon cancer screening for personal history of colon cancer by Caron Kumar who saw the patient with a wellness exam on 08/04/2022. Patient currently denies abdominal pain, heartburn, nausea, vomiting, dysphagia. He has frequent loose stools and diarrhea. He states sometimes he will have 10-12 bowel movements in a day but no blood. He does have a lot of mucus but usually small volume stools. He has seen Dr. Chiu in the past and I could not review those previous notes. According to the patient's wife he tried Align but it did not seem to help. He never really tried any prescriptive medications that they recall. He had had a colonoscopy 01/30/2007, showed a tubular adenoma, rectal mass with moderately differentiated adenocarcinoma. On surgery he had 6 out of 19 positive nodes. Follow-up colonoscopy showed a hyperplastic polyp 03/18/2008. Colonoscopy 04/30/2010 showed a tubular adenoma, negative biopsy from the rectum. It is not clear if he had a colonoscopy in 2015, I could only pull up pathology. It shows a pathology report but no results and I am not certain if this was done. He did, however, have another colonoscopy 02/11/2016. He had 3 tubular adenomas removed at that time and that colonoscopy was done by Dr. Chiu. Patient had previously seen Dr. Lewis and is on Plaquenil for arthritis.     Laboratory 06/10/2022 showed normal PSA, B12, TSH, CBC. CMP showed a creatinine 1.3, GFR 57.6, otherwise normal. Cholesterol panel showed an LDL of 102 otherwise normal, normal B12, TSH, CBC.     The patient currently denies tobacco,  alcohol or illicit substance use. He has a history of kidney stones, asthma, colorectal cancer with resection of the left colon. Father  at unknown age, mother  at age 88, spouse in good health,  52 years. One brother in poor health, one sister in good health, one child in good health.     A/P: Patient with personal history of colorectal cancer, personal history of colon polyps with frequent stools, may be multifactorial. Recommend colonoscopy. I did suggest a trial on Xifaxan for possible IBS-D. He could also have a potential stricture. It is not clear if he had radiation colitis in the past and as I could not review any of his previous endoscopy or the office notes at that time. Depending on how he does with the Xifaxan who also thought that the Plaquenil could be causing diarrhea but that has helped his arthritis significantly and he would like to continue on that. Would consider other treatments depending on the results of the above but will plan follow-up after the above.     Thank you very much Caron for this consultation, and for allowing us to participate in the care of your patient. Will keep you informed.       The following portions of the patient's history were reviewed and updated as appropriate:   Past Medical History:   Diagnosis Date    Abdominal bloating     After-cataract with vision obscured     left    Allergic rhinitis     Artificial lens present     both    Asthma     cough variant    Asthma     IgE-mediated allergic asthma       Colonic polyp     Polyp of large intestine - multiple       Cranial nerve disorder, unspecified      right 3rd x 10days to 2wks       Diabetes mellitus (HCC)     Disorder of skin     lesion to right forehead       Essential hypertension     which may be renovascular in origin       MEL (generalized anxiety disorder)     Generalized colicky abdominal pain     History of colonic polyps     History of echocardiogram 2008    normal systolic  function,minimal eft atrial enlargement,aortic root upper limits of normal    History of malignant neoplasm of colon     Hyperlipidemia     Hypokalemia     persistent    Ischemic optic neuropathy of both eyes     Kidney stone     with hyperoxaluria now for 1st time       Long term use of drug     Malaise and fatigue     Neoplasm of uncertain behavior of skin     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    Osteoarthritis     Pain in lower limb     Primary malignant neoplasm of colon (HCC)     metastatic,without evidence of recurrent metastase       Pseudophakia     Renal impairment     stage 1    Stasis edema     Type 2 diabetes mellitus (HCC)     no BDR    Vitamin D deficiency     on treatment     Past Surgical History:   Procedure Laterality Date    CARDIAC CATHETERIZATION  04/25/2008    selective coronary angiography,right iliofemoral,mild nonobstructive CAD,potential for dilated ascending aorta given the necessity of using a JL5    CARDIAC CATHETERIZATION N/A 7/19/2018    Procedure: Left Heart Cath/ PCI if indicated;  Surgeon: Erik So MD;  Location: [facility] INVASIVE LOCATION;  Service: Cardiovascular    CATARACT EXTRACTION  06/18/2014    AFTER CATARACT LASER SURGERY 85193 (1)       CATARACT EXTRACTION W/ INTRAOCULAR LENS IMPLANT Left 12/19/2006    COLECTOMY PARTIAL / TOTAL  01/31/2007    low anterior resection of the colon with stapled colorectal anastomosis.Middle rectal cancer w/suspected sigmoid serosal implants at the rectosigmoid junction    COLONOSCOPY W/ POLYPECTOMY  02/11/2016    Patent end-to-end colo-colonic anastomosis.Melanosis in the colon.One 2 mm polyp at 85 cm prox.to anus.Resected and retrieved.One 5 mm polyp at 80 cm prox to anus.Resected and retrieved.One 1 mm polyp at 40 cm prox. to anus.Resected and retrieved.    EXCISION LESION  11/06/2001    Electrodesiccation and curettage x 3, right upper back    EXTRACORPOREAL SHOCK WAVE LITHOTRIPSY (ESWL)  08/24/2005    right extracorporeal shock  wave 1000 shocks at an energy level 9.Bilateral ureteral calculus,right one symptomatic.Stone 5 x7    MEDIPORT INSERTION, SINGLE  04/11/2007    left subclavin Mediport,metastatic colon cancer     History reviewed. No pertinent family history.    Allergies   Allergen Reactions    Latex Rash     Social History     Socioeconomic History    Marital status:    Tobacco Use    Smoking status: Never Smoker    Smokeless tobacco: Never Used   Substance and Sexual Activity    Alcohol use: No     Current Medications:  Prior to Admission medications    Medication Sig Start Date End Date Taking? Authorizing Provider   albuterol (PROVENTIL HFA;VENTOLIN HFA) 108 (90 Base) MCG/ACT inhaler Inhale 2 puffs Every 4 (Four) Hours As Needed for Wheezing. 4/25/18   Caron Stinson APRN   allopurinol (ZYLOPRIM) 100 MG tablet Take 1 tablet by mouth 2 (Two) Times a Day. 6/6/22   Caron Stinson APRN   amLODIPine (NORVASC) 10 MG tablet Take 1 tablet by mouth Daily. 7/30/21   Caron Stinson APRN   Ascorbic Acid (Vitamin C ER) 1000 MG tablet controlled-release Take 2,000 mg by mouth Daily.    Provider, MD Tianna   glucose blood (FREESTYLE LITE) test strip Use to test blood sugar up to 3 times daily **Freestyle Lite**  Patient taking differently: Use to test blood sugar up to 3 times daily **Freestyle Lite** 4/15/21   Caron Stinson APRN   hydroxychloroquine (PLAQUENIL) 200 MG tablet Take 1 tablet by mouth Daily. 8/23/22 8/18/23  Caron Stinson APRN   Lancets (freestyle) lancets Use to test blood sugar up to 3 times daily **Freestyle**  Patient taking differently: Use to test blood sugar up to 3 times daily **Freestyle** 4/15/21   Caron Stinson APRN   metoprolol succinate XL (TOPROL-XL) 25 MG 24 hr tablet Take 1 tablet by mouth Daily. 4/18/22   Caron Stinson APRN   potassium citrate (UROCIT-K) 10 MEQ (1080 MG) CR tablet Take 2 tablets by mouth 4 (Four) Times a Day for hypokalemia.  "8/8/22   Caron Stinson APRN   valsartan-hydrochlorothiazide (DIOVAN-HCT) 320-25 MG per tablet Take 1 tablet by mouth Daily **Need to make appointment for more refills** 7/11/22   Caron Stinson APRN   vitamin D (ERGOCALCIFEROL) 1.25 MG (54192 UT) capsule capsule Take 1 capsule by mouth Every 7 (Seven) Days. 6/21/22   Caron Stinson APRN   vitamin D (ERGOCALCIFEROL) 1.25 MG (12590 UT) capsule capsule Take 1 capsule by mouth Every 7 (Seven) Days. 6/22/22   Caron Stinson APRN   vitamin E 200 UNIT capsule Take 1 capsule by mouth Daily. 6/22/22   Caron Stinson APRN     Review of Systems  Review of Systems   HENT: Negative for trouble swallowing.    Gastrointestinal: Positive for abdominal pain, diarrhea and nausea. Negative for anal bleeding, blood in stool and constipation.          Objective    /69   Pulse 64   Ht 180.3 cm (71\")   Wt 98 kg (216 lb)   BMI 30.13 kg/m²   Physical Exam  Vitals and nursing note reviewed.   Constitutional:       General: He is not in acute distress.     Appearance: He is well-developed. He is obese.   HENT:      Head: Normocephalic and atraumatic.   Eyes:      Pupils: Pupils are equal, round, and reactive to light.   Cardiovascular:      Rate and Rhythm: Normal rate and regular rhythm.      Heart sounds: Normal heart sounds.   Pulmonary:      Effort: Pulmonary effort is normal.      Breath sounds: Normal breath sounds.   Abdominal:      General: Bowel sounds are normal. There is no distension or abdominal bruit.      Palpations: Abdomen is soft. Abdomen is not rigid. There is no shifting dullness or mass.      Tenderness: There is abdominal tenderness. There is no guarding or rebound.      Hernia: No hernia is present. There is no hernia in the ventral area.   Musculoskeletal:         General: Normal range of motion.      Cervical back: Normal range of motion.   Skin:     General: Skin is warm and dry.   Neurological:      Mental Status: " He is alert and oriented to person, place, and time.   Psychiatric:         Behavior: Behavior normal.         Thought Content: Thought content normal.         Judgment: Judgment normal.       Assessment & Plan      1. Diarrhea, unspecified type    2. History of colon cancer    3. History of colon polyps    4. Incontinence of feces with fecal urgency    5. Irritable bowel syndrome with diarrhea    .   Diagnoses and all orders for this visit:    1. Diarrhea, unspecified type (Primary)  -     Case Request; Standing  -     Case Request  -     riFAXIMin (XIFAXAN) 550 MG tablet; Take 1 tablet by mouth 3 (Three) Times a Day.  Dispense: 42 tablet; Refill: 1    2. History of colon cancer  -     Case Request; Standing  -     Case Request    3. History of colon polyps  -     Case Request; Standing  -     Case Request    4. Incontinence of feces with fecal urgency  -     Case Request; Standing  -     Case Request    5. Irritable bowel syndrome with diarrhea  -     riFAXIMin (XIFAXAN) 550 MG tablet; Take 1 tablet by mouth 3 (Three) Times a Day.  Dispense: 42 tablet; Refill: 1    Other orders  -     Follow Anesthesia Guidelines / Standing Orders; Future  -     Obtain Informed Consent; Future        Orders placed during this encounter include:  Orders Placed This Encounter   Procedures    Follow Anesthesia Guidelines / Standing Orders     Standing Status:   Future    Obtain Informed Consent     Standing Status:   Future     Order Specific Question:   Informed Consent Given For     Answer:   COLONOSCOPY       Medications prescribed:  New Medications Ordered This Visit   Medications    riFAXIMin (XIFAXAN) 550 MG tablet     Sig: Take 1 tablet by mouth 3 (Three) Times a Day.     Dispense:  42 tablet     Refill:  1       Requested Prescriptions     Signed Prescriptions Disp Refills    riFAXIMin (XIFAXAN) 550 MG tablet 42 tablet 1     Sig: Take 1 tablet by mouth 3 (Three) Times a Day.       Review and/or summary of lab tests,  radiology, procedures, medications. Review and summary of old records and obtaining of history. The risks and benefits of my recommendations, as well as other treatment options were discussed with the patient today. Questions were answered.    Follow-up: Return in about 1 month (around 9/24/2022), or if symptoms worsen or fail to improve.     COLONOSCOPY--bx (N/A)      This document has been electronically signed by Tashi Ribeiro PA-C on August 24, 2022 19:19 CDT      Results for orders placed or performed in visit on 06/06/22   PSA Screen    Specimen: Blood   Result Value Ref Range    PSA 0.794 0.000 - 4.000 ng/mL   CBC Auto Differential    Specimen: Blood   Result Value Ref Range    WBC 6.24 3.40 - 10.80 10*3/mm3    RBC 5.23 4.14 - 5.80 10*6/mm3    Hemoglobin 15.4 13.0 - 17.7 g/dL    Hematocrit 45.2 37.5 - 51.0 %    MCV 86.4 79.0 - 97.0 fL    MCH 29.4 26.6 - 33.0 pg    MCHC 34.1 31.5 - 35.7 g/dL    RDW 13.5 12.3 - 15.4 %    RDW-SD 42.4 37.0 - 54.0 fl    MPV 10.3 6.0 - 12.0 fL    Platelets 218 140 - 450 10*3/mm3    Neutrophil % 64.1 42.7 - 76.0 %    Lymphocyte % 21.3 19.6 - 45.3 %    Monocyte % 10.7 5.0 - 12.0 %    Eosinophil % 2.9 0.3 - 6.2 %    Basophil % 0.8 0.0 - 1.5 %    Immature Grans % 0.2 0.0 - 0.5 %    Neutrophils, Absolute 4.00 1.70 - 7.00 10*3/mm3    Lymphocytes, Absolute 1.33 0.70 - 3.10 10*3/mm3    Monocytes, Absolute 0.67 0.10 - 0.90 10*3/mm3    Eosinophils, Absolute 0.18 0.00 - 0.40 10*3/mm3    Basophils, Absolute 0.05 0.00 - 0.20 10*3/mm3    Immature Grans, Absolute 0.01 0.00 - 0.05 10*3/mm3    nRBC 0.0 0.0 - 0.2 /100 WBC   TSH    Specimen: Blood   Result Value Ref Range    TSH 2.140 0.270 - 4.200 uIU/mL   Vitamin B12    Specimen: Blood   Result Value Ref Range    Vitamin B-12 437 211 - 946 pg/mL   Lipid Panel    Specimen: Blood   Result Value Ref Range    Total Cholesterol 156 0 - 200 mg/dL    Triglycerides 71 0 - 150 mg/dL    HDL Cholesterol 40 40 - 60 mg/dL    LDL Cholesterol  102 (H) 0 - 100  mg/dL    VLDL Cholesterol 14 5 - 40 mg/dL    LDL/HDL Ratio 2.55    Comprehensive Metabolic Panel    Specimen: Blood   Result Value Ref Range    Glucose 99 65 - 99 mg/dL    BUN 20 8 - 23 mg/dL    Creatinine 1.30 (H) 0.76 - 1.27 mg/dL    Sodium 141 136 - 145 mmol/L    Potassium 4.7 3.5 - 5.2 mmol/L    Chloride 102 98 - 107 mmol/L    CO2 27.8 22.0 - 29.0 mmol/L    Calcium 10.0 8.6 - 10.5 mg/dL    Total Protein 7.4 6.0 - 8.5 g/dL    Albumin 4.10 3.50 - 5.20 g/dL    ALT (SGPT) 14 1 - 41 U/L    AST (SGOT) 25 1 - 40 U/L    Alkaline Phosphatase 68 39 - 117 U/L    Total Bilirubin 0.7 0.0 - 1.2 mg/dL    Globulin 3.3 gm/dL    A/G Ratio 1.2 g/dL    BUN/Creatinine Ratio 15.4 7.0 - 25.0    Anion Gap 11.2 5.0 - 15.0 mmol/L    eGFR 57.6 (L) >60.0 mL/min/1.73   Results for orders placed or performed in visit on 11/20/20   PSA Screen    Specimen: Blood   Result Value Ref Range    PSA 0.859 0.000 - 4.000 ng/mL   CBC Auto Differential    Specimen: Blood   Result Value Ref Range    WBC 5.75 3.40 - 10.80 10*3/mm3    RBC 5.73 4.14 - 5.80 10*6/mm3    Hemoglobin 16.9 13.0 - 17.7 g/dL    Hematocrit 48.2 37.5 - 51.0 %    MCV 84.1 79.0 - 97.0 fL    MCH 29.5 26.6 - 33.0 pg    MCHC 35.1 31.5 - 35.7 g/dL    RDW 13.4 12.3 - 15.4 %    RDW-SD 40.4 37.0 - 54.0 fl    MPV 10.0 6.0 - 12.0 fL    Platelets 162 140 - 450 10*3/mm3    Neutrophil % 60.6 42.7 - 76.0 %    Lymphocyte % 25.0 19.6 - 45.3 %    Monocyte % 10.4 5.0 - 12.0 %    Eosinophil % 2.8 0.3 - 6.2 %    Basophil % 1.0 0.0 - 1.5 %    Immature Grans % 0.2 0.0 - 0.5 %    Neutrophils, Absolute 3.48 1.70 - 7.00 10*3/mm3    Lymphocytes, Absolute 1.44 0.70 - 3.10 10*3/mm3    Monocytes, Absolute 0.60 0.10 - 0.90 10*3/mm3    Eosinophils, Absolute 0.16 0.00 - 0.40 10*3/mm3    Basophils, Absolute 0.06 0.00 - 0.20 10*3/mm3    Immature Grans, Absolute 0.01 0.00 - 0.05 10*3/mm3    nRBC 0.0 0.0 - 0.2 /100 WBC   TSH    Specimen: Blood   Result Value Ref Range    TSH 2.940 0.270 - 4.200 uIU/mL   Magnesium     Specimen: Blood   Result Value Ref Range    Magnesium 2.0 1.6 - 2.4 mg/dL   Hemoglobin A1c    Specimen: Blood   Result Value Ref Range    Hemoglobin A1C 5.70 (H) 4.80 - 5.60 %   Vitamin B12    Specimen: Blood   Result Value Ref Range    Vitamin B-12 428 211 - 946 pg/mL   Lipid Panel    Specimen: Blood   Result Value Ref Range    Total Cholesterol 152 0 - 200 mg/dL    Triglycerides 94 0 - 150 mg/dL    HDL Cholesterol 39 (L) 40 - 60 mg/dL    LDL Cholesterol  95 0 - 100 mg/dL    VLDL Cholesterol 18 5 - 40 mg/dL    LDL/HDL Ratio 2.42    Comprehensive Metabolic Panel    Specimen: Blood   Result Value Ref Range    Glucose 94 65 - 99 mg/dL    BUN 19 8 - 23 mg/dL    Creatinine 1.22 0.76 - 1.27 mg/dL    Sodium 137 136 - 145 mmol/L    Potassium 4.0 3.5 - 5.2 mmol/L    Chloride 100 98 - 107 mmol/L    CO2 29.3 (H) 22.0 - 29.0 mmol/L    Calcium 9.9 8.6 - 10.5 mg/dL    Total Protein 7.6 6.0 - 8.5 g/dL    Albumin 4.40 3.50 - 5.20 g/dL    ALT (SGPT) 13 1 - 41 U/L    AST (SGOT) 23 1 - 40 U/L    Alkaline Phosphatase 84 39 - 117 U/L    Total Bilirubin 0.9 0.0 - 1.2 mg/dL    eGFR Non African Amer 58 (L) >60 mL/min/1.73    Globulin 3.2 gm/dL    A/G Ratio 1.4 g/dL    BUN/Creatinine Ratio 15.6 7.0 - 25.0    Anion Gap 7.7 5.0 - 15.0 mmol/L     *Note: Due to a large number of results and/or encounters for the requested time period, some results have not been displayed. A complete set of results can be found in Results Review.

## 2022-08-26 ENCOUNTER — TELEPHONE (OUTPATIENT)
Dept: GASTROENTEROLOGY | Facility: CLINIC | Age: 72
End: 2022-08-26

## 2022-08-26 RX ORDER — METRONIDAZOLE 500 MG/1
500 TABLET ORAL 2 TIMES DAILY
Qty: 20 TABLET | Refills: 0 | Status: SHIPPED | OUTPATIENT
Start: 2022-08-26 | End: 2022-10-05 | Stop reason: SDUPTHER

## 2022-08-26 RX ORDER — HYDROXYCHLOROQUINE SULFATE 200 MG/1
200 TABLET, FILM COATED ORAL DAILY
Qty: 60 TABLET | Refills: 3 | Status: SHIPPED | OUTPATIENT
Start: 2022-08-26 | End: 2022-09-28

## 2022-08-26 RX ORDER — HYDROXYCHLOROQUINE SULFATE 200 MG/1
200 TABLET, FILM COATED ORAL 2 TIMES DAILY
Qty: 60 TABLET | Refills: 11 | Status: CANCELLED | OUTPATIENT
Start: 2022-08-26

## 2022-08-26 NOTE — TELEPHONE ENCOUNTER
----- Message from Tashi Ribeiro PA-C sent at 8/26/2022  2:38 PM CDT -----  Contact: [phone]  I sent in Flagyl  ----- Message -----  From: Liliana Mitchell  Sent: 8/26/2022   2:28 PM CDT  To: Tashi Ribeiro PA-C    Patient states the xifanxan was over $800 dollars and we are out of samples of mediation. Patient is wanting to know if wanted to prescribe something different.

## 2022-08-26 NOTE — TELEPHONE ENCOUNTER
Spoke with patient wife Jessie and let her know that Tashi has sent another prescription to his pharmacy for flagyl. Jessie acknowledged.

## 2022-09-06 RX ORDER — ALLOPURINOL 100 MG/1
100 TABLET ORAL 2 TIMES DAILY
Qty: 180 TABLET | Refills: 0 | Status: SHIPPED | OUTPATIENT
Start: 2022-09-06 | End: 2022-12-09 | Stop reason: SDUPTHER

## 2022-09-06 RX ORDER — SODIUM, POTASSIUM,MAG SULFATES 17.5-3.13G
SOLUTION, RECONSTITUTED, ORAL ORAL
Qty: 354 ML | Refills: 0 | Status: SHIPPED | OUTPATIENT
Start: 2022-09-06 | End: 2022-10-05

## 2022-09-21 ENCOUNTER — HOSPITAL ENCOUNTER (OUTPATIENT)
Facility: HOSPITAL | Age: 72
Setting detail: HOSPITAL OUTPATIENT SURGERY
Discharge: HOME | End: 2022-09-21
Attending: INTERNAL MEDICINE | Admitting: INTERNAL MEDICINE

## 2022-09-21 ENCOUNTER — ANESTHESIA (OUTPATIENT)
Dept: GASTROENTEROLOGY | Facility: HOSPITAL | Age: 72
End: 2022-09-21

## 2022-09-21 ENCOUNTER — ANESTHESIA EVENT (OUTPATIENT)
Dept: GASTROENTEROLOGY | Facility: HOSPITAL | Age: 72
End: 2022-09-21

## 2022-09-21 VITALS
TEMPERATURE: 97 F | RESPIRATION RATE: 19 BRPM | SYSTOLIC BLOOD PRESSURE: 154 MMHG | OXYGEN SATURATION: 97 % | DIASTOLIC BLOOD PRESSURE: 67 MMHG | HEART RATE: 77 BPM

## 2022-09-21 DIAGNOSIS — Z85.038 HISTORY OF COLON CANCER: ICD-10-CM

## 2022-09-21 DIAGNOSIS — R15.9 INCONTINENCE OF FECES WITH FECAL URGENCY: ICD-10-CM

## 2022-09-21 DIAGNOSIS — R15.2 INCONTINENCE OF FECES WITH FECAL URGENCY: ICD-10-CM

## 2022-09-21 DIAGNOSIS — Z86.010 HISTORY OF COLON POLYPS: ICD-10-CM

## 2022-09-21 DIAGNOSIS — R19.7 DIARRHEA, UNSPECIFIED TYPE: ICD-10-CM

## 2022-09-21 PROCEDURE — 45380 COLONOSCOPY AND BIOPSY: CPT | Performed by: INTERNAL MEDICINE

## 2022-09-21 PROCEDURE — 25010000002 PROPOFOL 10 MG/ML EMULSION: Performed by: NURSE ANESTHETIST, CERTIFIED REGISTERED

## 2022-09-21 PROCEDURE — 88305 TISSUE EXAM BY PATHOLOGIST: CPT

## 2022-09-21 PROCEDURE — 25010000002 ONDANSETRON PER 1 MG: Performed by: NURSE ANESTHETIST, CERTIFIED REGISTERED

## 2022-09-21 RX ORDER — ONDANSETRON 2 MG/ML
INJECTION INTRAMUSCULAR; INTRAVENOUS AS NEEDED
Status: DISCONTINUED | OUTPATIENT
Start: 2022-09-21 | End: 2022-09-21 | Stop reason: SURG

## 2022-09-21 RX ORDER — DEXTROSE AND SODIUM CHLORIDE 5; .45 G/100ML; G/100ML
30 INJECTION, SOLUTION INTRAVENOUS CONTINUOUS PRN
Status: DISCONTINUED | OUTPATIENT
Start: 2022-09-21 | End: 2022-09-21 | Stop reason: HOSPADM

## 2022-09-21 RX ORDER — PROPOFOL 10 MG/ML
VIAL (ML) INTRAVENOUS AS NEEDED
Status: DISCONTINUED | OUTPATIENT
Start: 2022-09-21 | End: 2022-09-21 | Stop reason: SURG

## 2022-09-21 RX ADMIN — PROPOFOL 20 MG: 10 INJECTION, EMULSION INTRAVENOUS at 12:37

## 2022-09-21 RX ADMIN — PROPOFOL 70 MG: 10 INJECTION, EMULSION INTRAVENOUS at 12:32

## 2022-09-21 RX ADMIN — DEXTROSE AND SODIUM CHLORIDE 30 ML/HR: 5; 450 INJECTION, SOLUTION INTRAVENOUS at 11:26

## 2022-09-21 RX ADMIN — ONDANSETRON 4 MG: 2 INJECTION INTRAMUSCULAR; INTRAVENOUS at 12:47

## 2022-09-21 RX ADMIN — PROPOFOL 20 MG: 10 INJECTION, EMULSION INTRAVENOUS at 12:39

## 2022-09-21 RX ADMIN — PROPOFOL 20 MG: 10 INJECTION, EMULSION INTRAVENOUS at 12:34

## 2022-09-21 NOTE — ANESTHESIA PREPROCEDURE EVALUATION
Anesthesia Evaluation     Patient summary reviewed and Nursing notes reviewed   NPO Solid Status: > 8 hours  NPO Liquid Status: > 2 hours           Airway   Mallampati: II  No difficulty expected  Dental      Pulmonary - normal exam   (+) asthma,shortness of breath,   Cardiovascular     Rhythm: regular  Rate: normal    (+) hypertension well controlled less than 2 medications, hyperlipidemia,       Neuro/Psych  (+) syncope, psychiatric history Anxiety,    GI/Hepatic/Renal/Endo    (+)   diabetes mellitus type 2 well controlled,     Musculoskeletal     Abdominal    Substance History      OB/GYN          Other   arthritis,                      Anesthesia Plan    ASA 3     MAC     intravenous induction     Anesthetic plan, risks, benefits, and alternatives have been provided, discussed and informed consent has been obtained with: patient.    Plan discussed with CRNA.        CODE STATUS:

## 2022-09-21 NOTE — ANESTHESIA POSTPROCEDURE EVALUATION
Patient: Henrik Sands    Procedure Summary     Date: 09/21/22 Room / Location: [facility] ENDOSCOPY 1 / [facility] ENDOSCOPY    Anesthesia Start: 1228 Anesthesia Stop: 1247    Procedure: COLONOSCOPY--bx (N/A ) Diagnosis:       Diarrhea, unspecified type      History of colon cancer      History of colon polyps      Incontinence of feces with fecal urgency      (Diarrhea, unspecified type [R19.7])      (History of colon cancer [Z85.038])      (History of colon polyps [Z86.010])      (Incontinence of feces with fecal urgency [R15.9, R15.2])    Surgeons: Nehemiah Ramirez MD Provider: Nghia Vergara CRNA    Anesthesia Type: MAC ASA Status: 3          Anesthesia Type: MAC    Vitals  No vitals data found for the desired time range.          Post Anesthesia Care and Evaluation    Patient location during evaluation: bedside  Patient participation: complete - patient participated  Level of consciousness: awake and alert  Pain score: 0  Pain management: adequate    Airway patency: patent  Anesthetic complications: No anesthetic complications  PONV Status: none  Cardiovascular status: acceptable  Respiratory status: acceptable  Hydration status: acceptable    Comments: -------------------------              09/21/22                    1248        -------------------------   BP:         155/72        Pulse:        77          Resp:         19          Temp:   97 °F (36.1 °C)   SpO2:         97%        -------------------------

## 2022-09-23 LAB — REF LAB TEST METHOD: NORMAL

## 2022-09-28 RX ORDER — HYDROXYCHLOROQUINE SULFATE 200 MG/1
200 TABLET, FILM COATED ORAL 2 TIMES DAILY
Qty: 180 TABLET | Refills: 3 | Status: CANCELLED | OUTPATIENT
Start: 2022-09-28 | End: 2023-09-23

## 2022-09-28 RX ORDER — HYDROXYCHLOROQUINE SULFATE 200 MG/1
200 TABLET, FILM COATED ORAL 2 TIMES DAILY
Qty: 180 TABLET | Refills: 3 | Status: SHIPPED | OUTPATIENT
Start: 2022-09-28 | End: 2023-09-23

## 2022-09-28 NOTE — TELEPHONE ENCOUNTER
Pt says that Pharmacy needs the new script to say 2 X daily hydroxychloroquine (PLAQUENIL) 200 MG tablet [55954] (Order [id number])      [facility] - The pharmacy said last fill of the  script was only for 1 X daily and patient was told to take 2 X daily and needs 2X daily on script according to what pharmacy told them

## 2022-10-04 RX ORDER — VALSARTAN AND HYDROCHLOROTHIAZIDE 320; 25 MG/1; MG/1
1 TABLET, FILM COATED ORAL DAILY
Qty: 90 TABLET | Refills: 1 | Status: SHIPPED | OUTPATIENT
Start: 2022-10-04

## 2022-10-05 ENCOUNTER — TELEPHONE (OUTPATIENT)
Dept: GASTROENTEROLOGY | Facility: CLINIC | Age: 72
End: 2022-10-05

## 2022-10-05 ENCOUNTER — OFFICE VISIT (OUTPATIENT)
Dept: GASTROENTEROLOGY | Facility: CLINIC | Age: 72
End: 2022-10-05

## 2022-10-05 VITALS
OXYGEN SATURATION: 100 % | BODY MASS INDEX: 30.18 KG/M2 | HEART RATE: 66 BPM | DIASTOLIC BLOOD PRESSURE: 54 MMHG | SYSTOLIC BLOOD PRESSURE: 102 MMHG | WEIGHT: 215.6 LBS | HEIGHT: 71 IN

## 2022-10-05 DIAGNOSIS — Z86.010 HISTORY OF COLON POLYPS: ICD-10-CM

## 2022-10-05 DIAGNOSIS — Z85.038 HISTORY OF COLON CANCER: ICD-10-CM

## 2022-10-05 DIAGNOSIS — D48.9 VILLOUS ADENOMA: Primary | ICD-10-CM

## 2022-10-05 DIAGNOSIS — R19.7 DIARRHEA, UNSPECIFIED TYPE: ICD-10-CM

## 2022-10-05 PROCEDURE — 99214 OFFICE O/P EST MOD 30 MIN: CPT | Performed by: PHYSICIAN ASSISTANT

## 2022-10-05 RX ORDER — METRONIDAZOLE 500 MG/1
500 TABLET ORAL 2 TIMES DAILY
Qty: 28 TABLET | Refills: 0 | Status: SHIPPED | OUTPATIENT
Start: 2022-10-05 | End: 2022-10-31 | Stop reason: SDUPTHER

## 2022-10-05 NOTE — TELEPHONE ENCOUNTER
10/05/2022--- Patient is needing Dr. Ribeiro to ask Dr. Ramirez if radiation is a possibility treatment. Patients call back number is [phone].      I returned the patient call and there was no answer I did leave a message for patient to call our office back. I spoke with Tashi Ribeiro PA-C and he stated that the patient doesn't have colon cancer that it was precancerous and does not need radiation at this time

## 2022-10-05 NOTE — PROGRESS NOTES
Chief Complaint   Patient presents with    Follow-up     Colonoscopy--- 09/21/22       ENDO PROCEDURE ORDERED:    Subjective    Henrik Sands is a 72 y.o. male. he is here today for follow-up.    History of Present Illness    Patient seen on a recheck of his diarrhea, personal history of colon cancer, colon polyps. Last seen 08/24/2022. I had initially tried to give him Xifaxan, insurance would not cover though it was a high co-pay and he was given Flagyl. He is accompanied by his wife. While he was on the Flagyl it did help, he was feeling much better while taking it. He had a lot less gas and cramping. He has had a lot of loose fragmentary stools with a lot of gas since he had a colon resection. He usually has 10-12 bowel movements per day. Weight is down less than half a pound since last visit. He is on Plaquenil. Denied nausea, vomiting, fevers. He does have personal history of polyps, personal history of colon cancer with previous resection.     Patient underwent colonoscopy with Dr. Conte on 09/21/2022, showed a polyp at the appendiceal orifice. Biopsy showed this to be a tubular villous adenoma. He was recommended to have repeat colonoscopy at 1 year. I was not able to talk to Dr. Conte at the time I saw the patient.     A/P: Patient with personal history of colon cancer with tubular villous adenoma at the opening of his appendiceal orifice. I did discuss with Dr. Conte. He does not feel this can be removed endoscopically. I had previously discussed with the patient and his wife potential following repeat endoscopy. I did talk to Dr. Conte about the potential for possible appendectomy as a potential way to remove this area without requiring significant colectomy or right colon resection. Patient did have a poor prep despite his complaints of diarrhea. Will attempt to discuss with surgery. I have asked the patient to try a longer course of the Flagyl for 14 days and see if we have a better  response to his gas and diarrhea. Will also try probiotics. I have asked him to come back in about 3 weeks and will pursue further workup of his colon.     NOTE: The patient's wife was very concerned that the had colon cancer. I tried to explain to her that this was not colon cancer, this was a villous adenoma but it is arising out of the appendix. Will plan further pending clinical course and the results of the above.         The following portions of the patient's history were reviewed and updated as appropriate:   Past Medical History:   Diagnosis Date    Abdominal bloating     After-cataract with vision obscured     left    Allergic rhinitis     Artificial lens present     both    Asthma     cough variant    Asthma     IgE-mediated allergic asthma       Colonic polyp     Polyp of large intestine - multiple       Cranial nerve disorder, unspecified      right 3rd x 10days to 2wks       Diabetes mellitus (HCC)     Disorder of skin     lesion to right forehead       Essential hypertension     which may be renovascular in origin       MEL (generalized anxiety disorder)     Generalized colicky abdominal pain     History of colonic polyps     History of echocardiogram 04/25/2008    normal systolic function,minimal eft atrial enlargement,aortic root upper limits of normal    History of malignant neoplasm of colon     Hyperlipidemia     Hypokalemia     persistent    Ischemic optic neuropathy of both eyes     Kidney stone     with hyperoxaluria now for 1st time       Long term use of drug     Malaise and fatigue     Neoplasm of uncertain behavior of skin     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    Osteoarthritis     Pain in lower limb     Primary malignant neoplasm of colon (HCC)     metastatic,without evidence of recurrent metastase       Pseudophakia     Renal impairment     stage 1    Stasis edema     Type 2 diabetes mellitus (HCC)     no BDR    Vitamin D deficiency     on treatment     Past Surgical History:   Procedure Laterality  Date    CARDIAC CATHETERIZATION  04/25/2008    selective coronary angiography,right iliofemoral,mild nonobstructive CAD,potential for dilated ascending aorta given the necessity of using a JL5    CARDIAC CATHETERIZATION N/A 7/19/2018    Procedure: Left Heart Cath/ PCI if indicated;  Surgeon: Erik So MD;  Location: [facility] CATH INVASIVE LOCATION;  Service: Cardiovascular    CATARACT EXTRACTION  06/18/2014    AFTER CATARACT LASER SURGERY 46280 (1)       CATARACT EXTRACTION W/ INTRAOCULAR LENS IMPLANT Left 12/19/2006    COLECTOMY PARTIAL / TOTAL  01/31/2007    low anterior resection of the colon with stapled colorectal anastomosis.Middle rectal cancer w/suspected sigmoid serosal implants at the rectosigmoid junction    COLONOSCOPY N/A 9/21/2022    Procedure: COLONOSCOPY--bx;  Surgeon: Nehemiah Ramirez MD;  Location: [facility] ENDOSCOPY;  Service: Gastroenterology;  Laterality: N/A;    COLONOSCOPY W/ POLYPECTOMY  02/11/2016    Patent end-to-end colo-colonic anastomosis.Melanosis in the colon.One 2 mm polyp at 85 cm prox.to anus.Resected and retrieved.One 5 mm polyp at 80 cm prox to anus.Resected and retrieved.One 1 mm polyp at 40 cm prox. to anus.Resected and retrieved.    EXCISION LESION  11/06/2001    Electrodesiccation and curettage x 3, right upper back    EXTRACORPOREAL SHOCK WAVE LITHOTRIPSY (ESWL)  08/24/2005    right extracorporeal shock wave 1000 shocks at an energy level 9.Bilateral ureteral calculus,right one symptomatic.Stone 5 x7    MEDIPORT INSERTION, SINGLE  04/11/2007    left subclavin Mediport,metastatic colon cancer     History reviewed. No pertinent family history.    Allergies   Allergen Reactions    Latex Rash     Social History     Socioeconomic History    Marital status:    Tobacco Use    Smoking status: Never    Smokeless tobacco: Never   Substance and Sexual Activity    Alcohol use: No     Current Medications:  Prior to Admission medications    Medication Sig Start Date End  Date Taking? Authorizing Provider   albuterol (PROVENTIL HFA;VENTOLIN HFA) 108 (90 Base) MCG/ACT inhaler Inhale 2 puffs Every 4 (Four) Hours As Needed for Wheezing. 4/25/18  Yes Caron Stinson APRN   allopurinol (ZYLOPRIM) 100 MG tablet Take 1 tablet by mouth 2 (Two) Times a Day. 9/6/22  Yes Caron Stinson APRN   amLODIPine (NORVASC) 10 MG tablet Take 1 tablet by mouth Daily. 7/30/21  Yes Caron Stinson APRN   Ascorbic Acid (Vitamin C ER) 1000 MG tablet controlled-release Take 2,000 mg by mouth Daily.   Yes Provider, MD [place]   glucose blood (FREESTYLE LITE) test strip Use to test blood sugar up to 3 times daily **Freestyle Lite**  Patient taking differently: Use to test blood sugar up to 3 times daily **Freestyle Lite** 4/15/21  Yes Caron Stinson APRN   hydroxychloroquine (PLAQUENIL) 200 MG tablet Take 1 tablet by mouth 2 (Two) Times a Day. 9/28/22 9/23/23 Yes Caron Stinson APRN   Lancets (freestyle) lancets Use to test blood sugar up to 3 times daily **Freestyle**  Patient taking differently: Use to test blood sugar up to 3 times daily **Freestyle** 4/15/21  Yes Caron Stinson APRN   metoprolol succinate XL (TOPROL-XL) 25 MG 24 hr tablet Take 1 tablet by mouth Daily. 4/18/22  Yes Caron Stinson APRN   metroNIDAZOLE (FLAGYL) 500 MG tablet Take 1 tablet by mouth 2 (Two) Times a Day. 8/26/22  Yes Tashi Ribeiro PA-C   potassium citrate (UROCIT-K) 10 MEQ (1080 MG) CR tablet Take 2 tablets by mouth 4 (Four) Times a Day for hypokalemia. 8/8/22  Yes Caron Stinson APRN   valsartan-hydrochlorothiazide (DIOVAN-HCT) 320-25 MG per tablet Take 1 tablet by mouth Daily. 10/4/22  Yes Caron Stinson APRN   vitamin D (ERGOCALCIFEROL) 1.25 MG (06292 UT) capsule capsule Take 1 capsule by mouth Every 7 (Seven) Days. 6/21/22  Yes Caron Stinson APRN   vitamin E 200 UNIT capsule Take 1 capsule by mouth Daily. 6/22/22  Yes Caron Stinson,  "APRN   sodium-potassium-magnesium sulfates (Suprep Bowel Prep Kit) 17.5-3.13-1.6 GM/177ML solution oral solution Dilute and take by mouth according to the instructions given in office 9/6/22 10/5/22  Tashi Ribeiro PA-C     Review of Systems  Review of Systems       Objective    /54 (BP Location: Left arm, Patient Position: Sitting, Cuff Size: Large Adult)   Pulse 66   Ht 180.3 cm (71\")   Wt 97.8 kg (215 lb 9.6 oz)   SpO2 100%   BMI 30.07 kg/m²   Physical Exam  Vitals and nursing note reviewed.   Constitutional:       General: He is not in acute distress.     Appearance: He is well-developed. He is obese.   HENT:      Head: Normocephalic and atraumatic.   Eyes:      Pupils: Pupils are equal, round, and reactive to light.   Cardiovascular:      Rate and Rhythm: Normal rate and regular rhythm.      Heart sounds: Normal heart sounds.   Pulmonary:      Effort: Pulmonary effort is normal.      Breath sounds: Normal breath sounds.   Abdominal:      General: Bowel sounds are normal. There is no distension or abdominal bruit.      Palpations: Abdomen is soft. Abdomen is not rigid. There is no shifting dullness or mass.      Tenderness: There is abdominal tenderness. There is no guarding or rebound.      Hernia: No hernia is present. There is no hernia in the ventral area.   Musculoskeletal:         General: Normal range of motion.      Cervical back: Normal range of motion.   Skin:     General: Skin is warm and dry.   Neurological:      Mental Status: He is alert and oriented to person, place, and time.   Psychiatric:         Behavior: Behavior normal.         Thought Content: Thought content normal.         Judgment: Judgment normal.       Assessment & Plan      1. Villous adenoma    2. History of colon cancer    3. History of colon polyps    4. Diarrhea, unspecified type    .   Diagnoses and all orders for this visit:    1. Villous adenoma (Primary)    2. History of colon cancer    3. History of colon " polyps    4. Diarrhea, unspecified type    Other orders  -     metroNIDAZOLE (FLAGYL) 500 MG tablet; Take 1 tablet by mouth 2 (Two) Times a Day.  Dispense: 28 tablet; Refill: 0        Orders placed during this encounter include:  No orders of the defined types were placed in this encounter.      Medications prescribed:  New Medications Ordered This Visit   Medications    metroNIDAZOLE (FLAGYL) 500 MG tablet     Sig: Take 1 tablet by mouth 2 (Two) Times a Day.     Dispense:  28 tablet     Refill:  0     Discontinued Medications         Reason for Discontinue     sodium-potassium-magnesium sulfates (Suprep Bowel Prep Kit) 17.5-3.13-1.6 GM/177ML solution oral solution    *Therapy completed          Requested Prescriptions     Signed Prescriptions Disp Refills    metroNIDAZOLE (FLAGYL) 500 MG tablet 28 tablet 0     Sig: Take 1 tablet by mouth 2 (Two) Times a Day.       Review and/or summary of lab tests, radiology, procedures, medications. Review and summary of old records and obtaining of history. The risks and benefits of my recommendations, as well as other treatment options were discussed with the patient today. Questions were answered.    Follow-up: Return in about 1 month (around 11/5/2022), or if symptoms worsen or fail to improve.     * Surgery not found *      This document has been electronically signed by Tashi Ribeiro PA-C on October 19, 2022 18:39 CDT      Results for orders placed or performed during the hospital encounter of 09/21/22   TISSUE EXAM, [facility] ([facility])    Specimen: Large Intestine, Appendix; Tissue   Result Value Ref Range    Reference Lab Report       [facility]  [address] [place] [address]  Phone: [phone] or [phone]  Fax: [phone]  Kishor Wu M.D., Medical Director    PATIENT NAME                           LABORATORY NO.  CLARA GARCIA.                 [id number]  [id number]                         AGE      "         SEX  SSN           CLIENT REF #  [facility]           72      1950      [id number]   [id number]    [place]                       REQUESTING M.D.     ATTENDING M.D.     COPY TO.  [address] [place] [address]                 [facility] [place] [address]             DATE COLLECTED      DATE RECEIVED      DATE REPORTED  2022    DIAGNOSIS:  COLON, POLYP, APPENDICEAL ORIFICE:  Tubulovillous adenoma    JBS/pah    CLINICAL HISTORY:  Diarrhea, unspecified type, history of colon cancer, history of colon polyps,  incontinence of feces with fecal urgency    SPECIMENS  RECEIVED:  COLON, POLYP, APPENDICEAL ORIFICE    MICROSCOPIC DESCRIPTION:  Tissue blocks are prepared and slides are examined microscopically on all  specimens. See diagnosis for details.    Professional interpretation rendered by Abdelrahman Eagle M.D. at [facility]  [employer], [address] [place] [address].    GROSS DESCRIPTION:  Specimen is received in 1 formalin filled container labeled \"appendix\" and  consists of 3 portions of tan soft tissue measuring 0.5 x 0.5 x 0.2 cm.  The  specimen is submitted entirely in 1 cassette.  BW    REVIEWED, DIAGNOSED AND ELECTRONICALLY  SIGNED BY:    Abdelrahman Eagle M.D.  CPT CODES:  05414     Results for orders placed or performed in visit on 22   PSA Screen    Specimen: Blood   Result Value Ref Range    PSA 0.794 0.000 - 4.000 ng/mL   CBC Auto Differential    Specimen: Blood   Result Value Ref Range    WBC 6.24 3.40 - 10.80 10*3/mm3    RBC 5.23 4.14 - 5.80 10*6/mm3    Hemoglobin 15.4 13.0 - 17.7 g/dL    Hematocrit 45.2 37.5 - 51.0 %    MCV 86.4 79.0 - 97.0 fL    MCH 29.4 26.6 - 33.0 pg    MCHC 34.1 31.5 - 35.7 g/dL    RDW 13.5 12.3 - 15.4 %    RDW-SD 42.4 37.0 - 54.0 fl    MPV 10.3 6.0 - 12.0 fL    Platelets 218 140 - 450 10*3/mm3    Neutrophil % 64.1 42.7 - 76.0 %    Lymphocyte % 21.3 19.6 - 45.3 %    Monocyte " % 10.7 5.0 - 12.0 %    Eosinophil % 2.9 0.3 - 6.2 %    Basophil % 0.8 0.0 - 1.5 %    Immature Grans % 0.2 0.0 - 0.5 %    Neutrophils, Absolute 4.00 1.70 - 7.00 10*3/mm3    Lymphocytes, Absolute 1.33 0.70 - 3.10 10*3/mm3    Monocytes, Absolute 0.67 0.10 - 0.90 10*3/mm3    Eosinophils, Absolute 0.18 0.00 - 0.40 10*3/mm3    Basophils, Absolute 0.05 0.00 - 0.20 10*3/mm3    Immature Grans, Absolute 0.01 0.00 - 0.05 10*3/mm3    nRBC 0.0 0.0 - 0.2 /100 WBC   TSH    Specimen: Blood   Result Value Ref Range    TSH 2.140 0.270 - 4.200 uIU/mL   Vitamin B12    Specimen: Blood   Result Value Ref Range    Vitamin B-12 437 211 - 946 pg/mL   Lipid Panel    Specimen: Blood   Result Value Ref Range    Total Cholesterol 156 0 - 200 mg/dL    Triglycerides 71 0 - 150 mg/dL    HDL Cholesterol 40 40 - 60 mg/dL    LDL Cholesterol  102 (H) 0 - 100 mg/dL    VLDL Cholesterol 14 5 - 40 mg/dL    LDL/HDL Ratio 2.55    Comprehensive Metabolic Panel    Specimen: Blood   Result Value Ref Range    Glucose 99 65 - 99 mg/dL    BUN 20 8 - 23 mg/dL    Creatinine 1.30 (H) 0.76 - 1.27 mg/dL    Sodium 141 136 - 145 mmol/L    Potassium 4.7 3.5 - 5.2 mmol/L    Chloride 102 98 - 107 mmol/L    CO2 27.8 22.0 - 29.0 mmol/L    Calcium 10.0 8.6 - 10.5 mg/dL    Total Protein 7.4 6.0 - 8.5 g/dL    Albumin 4.10 3.50 - 5.20 g/dL    ALT (SGPT) 14 1 - 41 U/L    AST (SGOT) 25 1 - 40 U/L    Alkaline Phosphatase 68 39 - 117 U/L    Total Bilirubin 0.7 0.0 - 1.2 mg/dL    Globulin 3.3 gm/dL    A/G Ratio 1.2 g/dL    BUN/Creatinine Ratio 15.4 7.0 - 25.0    Anion Gap 11.2 5.0 - 15.0 mmol/L    eGFR 57.6 (L) >60.0 mL/min/1.73   Results for orders placed or performed in visit on 11/20/20   PSA Screen    Specimen: Blood   Result Value Ref Range    PSA 0.859 0.000 - 4.000 ng/mL   CBC Auto Differential    Specimen: Blood   Result Value Ref Range    WBC 5.75 3.40 - 10.80 10*3/mm3    RBC 5.73 4.14 - 5.80 10*6/mm3    Hemoglobin 16.9 13.0 - 17.7 g/dL    Hematocrit 48.2 37.5 - 51.0 %     MCV 84.1 79.0 - 97.0 fL    MCH 29.5 26.6 - 33.0 pg    MCHC 35.1 31.5 - 35.7 g/dL    RDW 13.4 12.3 - 15.4 %    RDW-SD 40.4 37.0 - 54.0 fl    MPV 10.0 6.0 - 12.0 fL    Platelets 162 140 - 450 10*3/mm3    Neutrophil % 60.6 42.7 - 76.0 %    Lymphocyte % 25.0 19.6 - 45.3 %    Monocyte % 10.4 5.0 - 12.0 %    Eosinophil % 2.8 0.3 - 6.2 %    Basophil % 1.0 0.0 - 1.5 %    Immature Grans % 0.2 0.0 - 0.5 %    Neutrophils, Absolute 3.48 1.70 - 7.00 10*3/mm3    Lymphocytes, Absolute 1.44 0.70 - 3.10 10*3/mm3    Monocytes, Absolute 0.60 0.10 - 0.90 10*3/mm3    Eosinophils, Absolute 0.16 0.00 - 0.40 10*3/mm3    Basophils, Absolute 0.06 0.00 - 0.20 10*3/mm3    Immature Grans, Absolute 0.01 0.00 - 0.05 10*3/mm3    nRBC 0.0 0.0 - 0.2 /100 WBC   TSH    Specimen: Blood   Result Value Ref Range    TSH 2.940 0.270 - 4.200 uIU/mL   Magnesium    Specimen: Blood   Result Value Ref Range    Magnesium 2.0 1.6 - 2.4 mg/dL   Hemoglobin A1c    Specimen: Blood   Result Value Ref Range    Hemoglobin A1C 5.70 (H) 4.80 - 5.60 %   Vitamin B12    Specimen: Blood   Result Value Ref Range    Vitamin B-12 428 211 - 946 pg/mL   Lipid Panel    Specimen: Blood   Result Value Ref Range    Total Cholesterol 152 0 - 200 mg/dL    Triglycerides 94 0 - 150 mg/dL    HDL Cholesterol 39 (L) 40 - 60 mg/dL    LDL Cholesterol  95 0 - 100 mg/dL    VLDL Cholesterol 18 5 - 40 mg/dL    LDL/HDL Ratio 2.42    Comprehensive Metabolic Panel    Specimen: Blood   Result Value Ref Range    Glucose 94 65 - 99 mg/dL    BUN 19 8 - 23 mg/dL    Creatinine 1.22 0.76 - 1.27 mg/dL    Sodium 137 136 - 145 mmol/L    Potassium 4.0 3.5 - 5.2 mmol/L    Chloride 100 98 - 107 mmol/L    CO2 29.3 (H) 22.0 - 29.0 mmol/L    Calcium 9.9 8.6 - 10.5 mg/dL    Total Protein 7.6 6.0 - 8.5 g/dL    Albumin 4.40 3.50 - 5.20 g/dL    ALT (SGPT) 13 1 - 41 U/L    AST (SGOT) 23 1 - 40 U/L    Alkaline Phosphatase 84 39 - 117 U/L    Total Bilirubin 0.9 0.0 - 1.2 mg/dL    eGFR Non  Amer 58 (L) >60  mL/min/1.73    Globulin 3.2 gm/dL    A/G Ratio 1.4 g/dL    BUN/Creatinine Ratio 15.6 7.0 - 25.0    Anion Gap 7.7 5.0 - 15.0 mmol/L     *Note: Due to a large number of results and/or encounters for the requested time period, some results have not been displayed. A complete set of results can be found in Results Review.

## 2022-10-24 RX ORDER — AMLODIPINE BESYLATE 10 MG/1
10 TABLET ORAL DAILY
Qty: 90 TABLET | Refills: 3 | Status: SHIPPED | OUTPATIENT
Start: 2022-10-24

## 2022-10-24 RX ORDER — METOPROLOL SUCCINATE 25 MG/1
25 TABLET, EXTENDED RELEASE ORAL DAILY
Qty: 30 TABLET | Refills: 5 | Status: SHIPPED | OUTPATIENT
Start: 2022-10-24 | End: 2022-10-31

## 2022-10-27 ENCOUNTER — OFFICE VISIT (OUTPATIENT)
Dept: GASTROENTEROLOGY | Facility: CLINIC | Age: 72
End: 2022-10-27

## 2022-10-27 VITALS
SYSTOLIC BLOOD PRESSURE: 112 MMHG | HEART RATE: 68 BPM | WEIGHT: 217.2 LBS | DIASTOLIC BLOOD PRESSURE: 78 MMHG | HEIGHT: 71 IN | OXYGEN SATURATION: 98 % | BODY MASS INDEX: 30.41 KG/M2

## 2022-10-27 DIAGNOSIS — Z86.010 HISTORY OF COLON POLYPS: ICD-10-CM

## 2022-10-27 DIAGNOSIS — Z85.038 HISTORY OF COLON CANCER: ICD-10-CM

## 2022-10-27 DIAGNOSIS — R19.7 DIARRHEA, UNSPECIFIED TYPE: ICD-10-CM

## 2022-10-27 DIAGNOSIS — D48.9 VILLOUS ADENOMA: Primary | ICD-10-CM

## 2022-10-27 PROCEDURE — 99214 OFFICE O/P EST MOD 30 MIN: CPT | Performed by: PHYSICIAN ASSISTANT

## 2022-10-27 NOTE — PROGRESS NOTES
Chief Complaint   Patient presents with   • Follow-up     Further work up on colon       ENDO PROCEDURE ORDERED:    Subjective    Henrik Sands is a 72 y.o. male. he is here today for follow-up.    History of Present Illness    The patient is seen on a recheck of his personal history of colon cancer, adenomatous polyp at the appendiceal orifice, diarrhea, abdominal pain, last seen 10/05/2022. He is again accompanied by his wife. I gave him a longer course of the Flagyl for 14 days. He also started taking the probiotic about 2 weeks ago his bowel movements have been much better. He has had more formed stools, not as much gas and urgency. On Sunday, he had several bowel movements with some increasing explosive gas, but since then has been more normal. He has been having 10-14 bowel movements a day and that has improved. He denied nausea, vomiting. No blood in the stool. His weight is up 1.5 pounds since last visit. Last colonoscopy was done on 09/21/2022. He had a polyp at the appendiceal orifice that could not be resected. This was as tubulovillous adenoma. He did have poor prep. I had discussed with Dr. Conte who did not feel he could remove this lesion endoscopically.    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 I did talk to Dr. Perkins about this patient who did feel that a standard appendectomy would be adequate, but could do try to do a partial resection of this area to try to remove the entire lesion. It does not appear to be malignant at this point, but would need further study. We discussed the possibility of pursuing CT imaging or further studies, but will await his opinions. The patient was encouraged to continue on the probiotics for now. Discussed of this with the patient and his wife. They agreed to proceed as above. We will plan followup in 6 weeks, further pending clinical course and the results of the above.      The  "following portions of the patient's history were reviewed and updated as appropriate:   Past Medical History:   Diagnosis Date   • Abdominal bloating    • After-cataract with vision obscured     left   • Allergic rhinitis    • Arthritis     RA   • Artificial lens present     both   • Asthma     \"mild\"   • Colon cancer (HCC)    • Colonic polyp     Polyp of large intestine - multiple      • Cranial nerve disorder, unspecified      right 3rd x 10days to 2wks      • Diabetes mellitus (HCC)    • Disorder of skin     lesion to right forehead      • Essential hypertension     which may be renovascular in origin      • MEL (generalized anxiety disorder)    • Generalized colicky abdominal pain    • GERD (gastroesophageal reflux disease)    • History of colonic polyps    • History of echocardiogram 04/25/2008    normal systolic function,minimal eft atrial enlargement,aortic root upper limits of normal   • History of malignant neoplasm of colon    • Hyperlipidemia    • Hypokalemia     persistent   • Ischemic optic neuropathy of both eyes    • Kidney stone     with hyperoxaluria now for 1st time      • Long term use of drug    • Malaise and fatigue    • Neoplasm of uncertain behavior of skin    • 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   • Osteoarthritis    • Pain in lower limb    • Primary malignant neoplasm of colon (HCC)    • Pseudophakia    • Renal impairment     stage 1   • Skin cancer     basal cell   • Stasis edema    • Type 2 diabetes mellitus (HCC)     no BDR   • Vitamin D deficiency     on treatment     Past Surgical History:   Procedure Laterality Date   • CARDIAC CATHETERIZATION  04/25/2008    selective coronary angiography,right iliofemoral,mild nonobstructive CAD,potential for dilated ascending aorta given the necessity of using a JL5   • CARDIAC CATHETERIZATION N/A 7/19/2018    Procedure: Left Heart Cath/ PCI if indicated;  Surgeon: Erik So MD;  Location: [facility] INVASIVE LOCATION;  Service: Cardiovascular "   • CATARACT EXTRACTION  06/18/2014    AFTER CATARACT LASER SURGERY 42021 (1)      • CATARACT EXTRACTION W/ INTRAOCULAR LENS IMPLANT Left 12/19/2006   • COLECTOMY PARTIAL / TOTAL  01/31/2007    low anterior resection of the colon with stapled colorectal anastomosis.Middle rectal cancer w/suspected sigmoid serosal implants at the rectosigmoid junction   • COLONOSCOPY N/A 9/21/2022    Procedure: COLONOSCOPY--bx;  Surgeon: Nehemiah Ramirez MD;  Location: [facility] ENDOSCOPY;  Service: Gastroenterology;  Laterality: N/A;   • COLONOSCOPY W/ POLYPECTOMY  02/11/2016    Patent end-to-end colo-colonic anastomosis.Melanosis in the colon.One 2 mm polyp at 85 cm prox.to anus.Resected and retrieved.One 5 mm polyp at 80 cm prox to anus.Resected and retrieved.One 1 mm polyp at 40 cm prox. to anus.Resected and retrieved.   • EXCISION LESION  11/06/2001    Electrodesiccation and curettage x 3, right upper back   • EXTRACORPOREAL SHOCK WAVE LITHOTRIPSY (ESWL)  08/24/2005    right extracorporeal shock wave 1000 shocks at an energy level 9.Bilateral ureteral calculus,right one symptomatic.Stone 5 x7   • MEDIPORT INSERTION, SINGLE  04/11/2007    left subclavin Mediport,metastatic colon cancer     History reviewed. No pertinent family history.    Allergies   Allergen Reactions   • Latex Rash     Social History     Socioeconomic History   • Marital status:    Tobacco Use   • Smoking status: Never   • Smokeless tobacco: Never   Vaping Use   • Vaping Use: Never used   Substance and Sexual Activity   • Alcohol use: No   • Drug use: Never   • Sexual activity: Defer     Current Medications:  Prior to Admission medications    Medication Sig Start Date End Date Taking? Authorizing Provider   albuterol (PROVENTIL HFA;VENTOLIN HFA) 108 (90 Base) MCG/ACT inhaler Inhale 2 puffs Every 4 (Four) Hours As Needed for Wheezing. 4/25/18  Yes Caron Stinson APRN   allopurinol (ZYLOPRIM) 100 MG tablet Take 1 tablet by mouth 2 (Two) Times a Day.  "9/6/22  Yes Caron Stinson APRN   amLODIPine (NORVASC) 10 MG tablet Take 1 tablet by mouth Daily. 10/24/22  Yes Caron Stinson APRN   Ascorbic Acid (Vitamin C ER) 1000 MG tablet controlled-release Take 2,000 mg by mouth Daily.   Yes Tianna Durán MD   glucose blood (FREESTYLE LITE) test strip Use to test blood sugar up to 3 times daily **Freestyle Lite**  Patient taking differently: Use to test blood sugar up to 3 times daily **Freestyle Lite** 4/15/21  Yes Caron Stinson APRN   hydroxychloroquine (PLAQUENIL) 200 MG tablet Take 1 tablet by mouth 2 (Two) Times a Day. 9/28/22 9/23/23 Yes Caron Stinson APRN   Lancets (freestyle) lancets Use to test blood sugar up to 3 times daily **Freestyle**  Patient taking differently: Use to test blood sugar up to 3 times daily **Freestyle** 4/15/21  Yes Caron Stinson APRN   metoprolol succinate XL (TOPROL-XL) 25 MG 24 hr tablet Take 1 tablet by mouth Daily. 10/24/22  Yes Caron Stinson APRN   potassium citrate (UROCIT-K) 10 MEQ (1080 MG) CR tablet Take 2 tablets by mouth 4 (Four) Times a Day for hypokalemia. 8/8/22  Yes Caron Stinson APRN   Unable to find 1 each 1 (One) Time. Med Name:Physician's Choice Probiotic   Yes ProviderTianna MD   valsartan-hydrochlorothiazide (DIOVAN-HCT) 320-25 MG per tablet Take 1 tablet by mouth Daily. 10/4/22  Yes Caron Stinson APRN   vitamin D (ERGOCALCIFEROL) 1.25 MG (99883 UT) capsule capsule Take 1 capsule by mouth Every 7 (Seven) Days. 6/21/22  Yes Caron Stinson APRN   vitamin E 200 UNIT capsule Take 1 capsule by mouth Daily. 6/22/22  Yes Caron Stinson APRN   metroNIDAZOLE (FLAGYL) 500 MG tablet Take 1 tablet by mouth 2 (Two) Times a Day. 10/5/22   Tashi Ribeiro PA-C     Review of Systems  Review of Systems       Objective    /78 (BP Location: Left arm, Patient Position: Sitting, Cuff Size: Large Adult)   Pulse 68   Ht 180.3 cm (71\")  "  Wt 98.5 kg (217 lb 3.2 oz)   SpO2 98%   BMI 30.29 kg/m²   Physical Exam  Vitals and nursing note reviewed.   Constitutional:       General: He is not in acute distress.     Appearance: He is well-developed.   HENT:      Head: Normocephalic and atraumatic.   Eyes:      Pupils: Pupils are equal, round, and reactive to light.   Cardiovascular:      Rate and Rhythm: Normal rate and regular rhythm.      Heart sounds: Normal heart sounds.   Pulmonary:      Effort: Pulmonary effort is normal.      Breath sounds: Normal breath sounds.   Abdominal:      General: Bowel sounds are normal. There is no distension or abdominal bruit.      Palpations: Abdomen is soft. Abdomen is not rigid. There is no shifting dullness or mass.      Tenderness: There is abdominal tenderness. There is no guarding or rebound.      Hernia: No hernia is present. There is no hernia in the ventral area.   Musculoskeletal:         General: Normal range of motion.      Cervical back: Normal range of motion.   Skin:     General: Skin is warm and dry.   Neurological:      Mental Status: He is alert and oriented to person, place, and time.   Psychiatric:         Behavior: Behavior normal.         Thought Content: Thought content normal.         Judgment: Judgment normal.       Assessment & Plan      1. Villous adenoma    2. History of colon cancer    3. History of colon polyps    4. Diarrhea, unspecified type    .   Diagnoses and all orders for this visit:    1. Villous adenoma (Primary)  Comments:  in appendiceal orafice, unable to remove endoscopically  Orders:  -     Ambulatory Referral to General Surgery    2. History of colon cancer  -     Ambulatory Referral to General Surgery    3. History of colon polyps    4. Diarrhea, unspecified type        Orders placed during this encounter include:  Orders Placed This Encounter   Procedures   • Ambulatory Referral to General Surgery     Referral Priority:   Routine     Referral Type:   Consultation      Referral Reason:   Specialty Services Required     Referred to Provider:   Sylvester Fowler MD     Requested Specialty:   General Surgery     Number of Visits Requested:   1       Medications prescribed:  No orders of the defined types were placed in this encounter.      Requested Prescriptions      No prescriptions requested or ordered in this encounter       Review and/or summary of lab tests, radiology, procedures, medications. Review and summary of old records and obtaining of history. The risks and benefits of my recommendations, as well as other treatment options were discussed with the patient today. Questions were answered.    Follow-up: Return in about 6 weeks (around 12/8/2022), or if symptoms worsen or fail to improve.     * Surgery not found *      This document has been electronically signed by Tashi Ribeiro PA-C on October 31, 2022 19:41 CDT      Results for orders placed or performed in visit on 10/31/22   PREVIOUS HISTORY    Specimen: Blood   Result Value Ref Range    Previous History Patient needs ABO/RH on day of surgery.    CBC (No Diff)    Specimen: Blood   Result Value Ref Range    WBC 5.75 3.40 - 10.80 10*3/mm3    RBC 5.55 4.14 - 5.80 10*6/mm3    Hemoglobin 16.4 13.0 - 17.7 g/dL    Hematocrit 47.6 37.5 - 51.0 %    MCV 85.8 79.0 - 97.0 fL    MCH 29.5 26.6 - 33.0 pg    MCHC 34.5 31.5 - 35.7 g/dL    RDW 13.6 12.3 - 15.4 %    RDW-SD 42.6 37.0 - 54.0 fl    MPV 9.8 6.0 - 12.0 fL    Platelets 215 140 - 450 10*3/mm3   Type & Screen    Specimen: Blood   Result Value Ref Range    ABO Type O     RH type Positive     Antibody Screen Negative     T&S Expiration Date 11/3/2022 11:59:59 PM    Comprehensive Metabolic Panel    Specimen: Blood   Result Value Ref Range    Glucose 90 65 - 99 mg/dL    BUN 20 8 - 23 mg/dL    Creatinine 1.33 (H) 0.76 - 1.27 mg/dL    Sodium 137 136 - 145 mmol/L    Potassium 3.8 3.5 - 5.2 mmol/L    Chloride 99 98 - 107 mmol/L    CO2 26.0 22.0 - 29.0 mmol/L    Calcium 10.0 8.6 -  10.5 mg/dL    Total Protein 7.3 6.0 - 8.5 g/dL    Albumin 4.30 3.50 - 5.20 g/dL    ALT (SGPT) 13 1 - 41 U/L    AST (SGOT) 23 1 - 40 U/L    Alkaline Phosphatase 83 39 - 117 U/L    Total Bilirubin 0.6 0.0 - 1.2 mg/dL    Globulin 3.0 gm/dL    A/G Ratio 1.4 g/dL    BUN/Creatinine Ratio 15.0 7.0 - 25.0    Anion Gap 12.0 5.0 - 15.0 mmol/L    eGFR 56.1 (L) >60.0 mL/min/1.73   ECG 12 Lead   Result Value Ref Range    QT Interval 402 ms    QTC Interval 411 ms   Results for orders placed or performed during the hospital encounter of 22   TISSUE EXAM, [facility])    Specimen: Large Intestine, Appendix; Tissue   Result Value Ref Range    Reference Lab Report       [facility]  [address] [place] [address]  Phone: [phone] or [phone]  Fax: [phone]  Kishor Wu M.D., Medical Director    PATIENT NAME                           LABORATORY NO.  CLARA GARCIA.                 [id number]  [id number]                         AGE              SEX  N           CLIENT REF #  [facility]           72      1950      [id number]   [id number]    [place]                       REQUESTING M.D.     ATTENDING M.D.     [address] [place] [address]             DATE COLLECTED      DATE RECEIVED      DATE REPORTED  2022    DIAGNOSIS:  COLON, POLYP, APPENDICEAL ORIFICE:  Tubulovillous adenoma    JBS/pah    CLINICAL HISTORY:  Diarrhea, unspecified type, history of colon cancer, history of colon polyps,  incontinence of feces with fecal urgency    SPECIMENS  RECEIVED:  COLON, POLYP, APPENDICEAL ORIFICE    MICROSCOPIC DESCRIPTION:  Tissue blocks are prepared and slides are examined microscopically on all  specimens. See diagnosis for details.    Professional interpretation rendered by Abdelrahman Eagle M.D. at [facility],  [employer],  "[address] [place], [place] [id number].    GROSS DESCRIPTION:  Specimen is received in 1 formalin filled container labeled \"appendix\" and  consists of 3 portions of tan soft tissue measuring 0.5 x 0.5 x 0.2 cm.  The  specimen is submitted entirely in 1 cassette.  BW    REVIEWED, DIAGNOSED AND ELECTRONICALLY  SIGNED BY:    Abdelrahman Eagle M.D.  CPT CODES:  83238     Results for orders placed or performed in visit on 06/06/22   PSA Screen    Specimen: Blood   Result Value Ref Range    PSA 0.794 0.000 - 4.000 ng/mL   CBC Auto Differential    Specimen: Blood   Result Value Ref Range    WBC 6.24 3.40 - 10.80 10*3/mm3    RBC 5.23 4.14 - 5.80 10*6/mm3    Hemoglobin 15.4 13.0 - 17.7 g/dL    Hematocrit 45.2 37.5 - 51.0 %    MCV 86.4 79.0 - 97.0 fL    MCH 29.4 26.6 - 33.0 pg    MCHC 34.1 31.5 - 35.7 g/dL    RDW 13.5 12.3 - 15.4 %    RDW-SD 42.4 37.0 - 54.0 fl    MPV 10.3 6.0 - 12.0 fL    Platelets 218 140 - 450 10*3/mm3    Neutrophil % 64.1 42.7 - 76.0 %    Lymphocyte % 21.3 19.6 - 45.3 %    Monocyte % 10.7 5.0 - 12.0 %    Eosinophil % 2.9 0.3 - 6.2 %    Basophil % 0.8 0.0 - 1.5 %    Immature Grans % 0.2 0.0 - 0.5 %    Neutrophils, Absolute 4.00 1.70 - 7.00 10*3/mm3    Lymphocytes, Absolute 1.33 0.70 - 3.10 10*3/mm3    Monocytes, Absolute 0.67 0.10 - 0.90 10*3/mm3    Eosinophils, Absolute 0.18 0.00 - 0.40 10*3/mm3    Basophils, Absolute 0.05 0.00 - 0.20 10*3/mm3    Immature Grans, Absolute 0.01 0.00 - 0.05 10*3/mm3    nRBC 0.0 0.0 - 0.2 /100 WBC   TSH    Specimen: Blood   Result Value Ref Range    TSH 2.140 0.270 - 4.200 uIU/mL   Vitamin B12    Specimen: Blood   Result Value Ref Range    Vitamin B-12 437 211 - 946 pg/mL   Lipid Panel    Specimen: Blood   Result Value Ref Range    Total Cholesterol 156 0 - 200 mg/dL    Triglycerides 71 0 - 150 mg/dL    HDL Cholesterol 40 40 - 60 mg/dL    LDL Cholesterol  102 (H) 0 - 100 mg/dL    VLDL Cholesterol 14 5 - 40 mg/dL    LDL/HDL Ratio 2.55    Comprehensive Metabolic Panel    " Specimen: Blood   Result Value Ref Range    Glucose 99 65 - 99 mg/dL    BUN 20 8 - 23 mg/dL    Creatinine 1.30 (H) 0.76 - 1.27 mg/dL    Sodium 141 136 - 145 mmol/L    Potassium 4.7 3.5 - 5.2 mmol/L    Chloride 102 98 - 107 mmol/L    CO2 27.8 22.0 - 29.0 mmol/L    Calcium 10.0 8.6 - 10.5 mg/dL    Total Protein 7.4 6.0 - 8.5 g/dL    Albumin 4.10 3.50 - 5.20 g/dL    ALT (SGPT) 14 1 - 41 U/L    AST (SGOT) 25 1 - 40 U/L    Alkaline Phosphatase 68 39 - 117 U/L    Total Bilirubin 0.7 0.0 - 1.2 mg/dL    Globulin 3.3 gm/dL    A/G Ratio 1.2 g/dL    BUN/Creatinine Ratio 15.4 7.0 - 25.0    Anion Gap 11.2 5.0 - 15.0 mmol/L    eGFR 57.6 (L) >60.0 mL/min/1.73   Results for orders placed or performed in visit on 11/20/20   PSA Screen    Specimen: Blood   Result Value Ref Range    PSA 0.859 0.000 - 4.000 ng/mL   CBC Auto Differential    Specimen: Blood   Result Value Ref Range    WBC 5.75 3.40 - 10.80 10*3/mm3    RBC 5.73 4.14 - 5.80 10*6/mm3    Hemoglobin 16.9 13.0 - 17.7 g/dL    Hematocrit 48.2 37.5 - 51.0 %    MCV 84.1 79.0 - 97.0 fL    MCH 29.5 26.6 - 33.0 pg    MCHC 35.1 31.5 - 35.7 g/dL    RDW 13.4 12.3 - 15.4 %    RDW-SD 40.4 37.0 - 54.0 fl    MPV 10.0 6.0 - 12.0 fL    Platelets 162 140 - 450 10*3/mm3    Neutrophil % 60.6 42.7 - 76.0 %    Lymphocyte % 25.0 19.6 - 45.3 %    Monocyte % 10.4 5.0 - 12.0 %    Eosinophil % 2.8 0.3 - 6.2 %    Basophil % 1.0 0.0 - 1.5 %    Immature Grans % 0.2 0.0 - 0.5 %    Neutrophils, Absolute 3.48 1.70 - 7.00 10*3/mm3    Lymphocytes, Absolute 1.44 0.70 - 3.10 10*3/mm3    Monocytes, Absolute 0.60 0.10 - 0.90 10*3/mm3    Eosinophils, Absolute 0.16 0.00 - 0.40 10*3/mm3    Basophils, Absolute 0.06 0.00 - 0.20 10*3/mm3    Immature Grans, Absolute 0.01 0.00 - 0.05 10*3/mm3    nRBC 0.0 0.0 - 0.2 /100 WBC     *Note: Due to a large number of results and/or encounters for the requested time period, some results have not been displayed. A complete set of results can be found in Results Review.

## 2022-10-31 ENCOUNTER — OFFICE VISIT (OUTPATIENT)
Dept: SURGERY | Facility: CLINIC | Age: 72
End: 2022-10-31

## 2022-10-31 ENCOUNTER — HOSPITAL ENCOUNTER (OUTPATIENT)
Dept: GENERAL RADIOLOGY | Facility: HOSPITAL | Age: 72
Discharge: HOME | End: 2022-10-31

## 2022-10-31 ENCOUNTER — PRE-ADMISSION TESTING (OUTPATIENT)
Dept: PREADMISSION TESTING | Facility: HOSPITAL | Age: 72
End: 2022-10-31

## 2022-10-31 VITALS
BODY MASS INDEX: 30.32 KG/M2 | HEIGHT: 71 IN | SYSTOLIC BLOOD PRESSURE: 142 MMHG | DIASTOLIC BLOOD PRESSURE: 64 MMHG | HEART RATE: 70 BPM | OXYGEN SATURATION: 96 % | TEMPERATURE: 97.8 F | WEIGHT: 216.6 LBS

## 2022-10-31 VITALS — RESPIRATION RATE: 16 BRPM

## 2022-10-31 DIAGNOSIS — Z86.010 HISTORY OF COLON POLYPS: Primary | ICD-10-CM

## 2022-10-31 DIAGNOSIS — Z86.010 HISTORY OF COLON POLYPS: ICD-10-CM

## 2022-10-31 PROBLEM — D37.3 APPENDICEAL TUMOR: Status: ACTIVE | Noted: 2022-10-31

## 2022-10-31 LAB
ABO GROUP BLD: NORMAL
ALBUMIN SERPL-MCNC: 4.3 G/DL (ref 3.5–5.2)
ALBUMIN/GLOB SERPL: 1.4 G/DL
ALP SERPL-CCNC: 83 U/L (ref 39–117)
ALT SERPL W P-5'-P-CCNC: 13 U/L (ref 1–41)
ANION GAP SERPL CALCULATED.3IONS-SCNC: 12 MMOL/L (ref 5–15)
AST SERPL-CCNC: 23 U/L (ref 1–40)
BILIRUB SERPL-MCNC: 0.6 MG/DL (ref 0–1.2)
BLD GP AB SCN SERPL QL: NEGATIVE
BUN SERPL-MCNC: 20 MG/DL (ref 8–23)
BUN/CREAT SERPL: 15 (ref 7–25)
CALCIUM SPEC-SCNC: 10 MG/DL (ref 8.6–10.5)
CHLORIDE SERPL-SCNC: 99 MMOL/L (ref 98–107)
CO2 SERPL-SCNC: 26 MMOL/L (ref 22–29)
CREAT SERPL-MCNC: 1.33 MG/DL (ref 0.76–1.27)
DEPRECATED RDW RBC AUTO: 42.6 FL (ref 37–54)
EGFRCR SERPLBLD CKD-EPI 2021: 56.1 ML/MIN/1.73
ERYTHROCYTE [DISTWIDTH] IN BLOOD BY AUTOMATED COUNT: 13.6 % (ref 12.3–15.4)
GLOBULIN UR ELPH-MCNC: 3 GM/DL
GLUCOSE SERPL-MCNC: 90 MG/DL (ref 65–99)
HCT VFR BLD AUTO: 47.6 % (ref 37.5–51)
HGB BLD-MCNC: 16.4 G/DL (ref 13–17.7)
Lab: NORMAL
MCH RBC QN AUTO: 29.5 PG (ref 26.6–33)
MCHC RBC AUTO-ENTMCNC: 34.5 G/DL (ref 31.5–35.7)
MCV RBC AUTO: 85.8 FL (ref 79–97)
PLATELET # BLD AUTO: 215 10*3/MM3 (ref 140–450)
PMV BLD AUTO: 9.8 FL (ref 6–12)
POTASSIUM SERPL-SCNC: 3.8 MMOL/L (ref 3.5–5.2)
PROT SERPL-MCNC: 7.3 G/DL (ref 6–8.5)
QT INTERVAL: 402 MS
QTC INTERVAL: 411 MS
RBC # BLD AUTO: 5.55 10*6/MM3 (ref 4.14–5.8)
RH BLD: POSITIVE
SODIUM SERPL-SCNC: 137 MMOL/L (ref 136–145)
T&S EXPIRATION DATE: NORMAL
WBC NRBC COR # BLD: 5.75 10*3/MM3 (ref 3.4–10.8)

## 2022-10-31 PROCEDURE — 86901 BLOOD TYPING SEROLOGIC RH(D): CPT

## 2022-10-31 PROCEDURE — 86850 RBC ANTIBODY SCREEN: CPT

## 2022-10-31 PROCEDURE — 85027 COMPLETE CBC AUTOMATED: CPT

## 2022-10-31 PROCEDURE — 71046 X-RAY EXAM CHEST 2 VIEWS: CPT

## 2022-10-31 PROCEDURE — 80053 COMPREHEN METABOLIC PANEL: CPT

## 2022-10-31 PROCEDURE — 36415 COLL VENOUS BLD VENIPUNCTURE: CPT

## 2022-10-31 PROCEDURE — 93005 ELECTROCARDIOGRAM TRACING: CPT

## 2022-10-31 PROCEDURE — 99204 OFFICE O/P NEW MOD 45 MIN: CPT | Performed by: STUDENT IN AN ORGANIZED HEALTH CARE EDUCATION/TRAINING PROGRAM

## 2022-10-31 PROCEDURE — 86900 BLOOD TYPING SEROLOGIC ABO: CPT

## 2022-10-31 PROCEDURE — 93010 ELECTROCARDIOGRAM REPORT: CPT | Performed by: INTERNAL MEDICINE

## 2022-10-31 RX ORDER — METOPROLOL SUCCINATE 25 MG/1
25 TABLET, EXTENDED RELEASE ORAL NIGHTLY
COMMUNITY
End: 2022-12-14 | Stop reason: SDUPTHER

## 2022-10-31 RX ORDER — OMEPRAZOLE 40 MG/1
40 CAPSULE, DELAYED RELEASE ORAL DAILY
COMMUNITY

## 2022-10-31 RX ORDER — SODIUM CHLORIDE 9 MG/ML
1000 INJECTION, SOLUTION INTRAVENOUS CONTINUOUS
Status: CANCELLED | OUTPATIENT
Start: 2022-11-04

## 2022-10-31 RX ORDER — MELOXICAM 15 MG/1
15 TABLET ORAL ONCE
Status: CANCELLED | OUTPATIENT
Start: 2022-11-04 | End: 2022-10-31

## 2022-10-31 RX ORDER — NEOMYCIN SULFATE 500 MG/1
1000 TABLET ORAL ONCE
Qty: 2 TABLET | Refills: 0 | Status: SHIPPED | OUTPATIENT
Start: 2022-10-31 | End: 2022-10-31

## 2022-10-31 RX ORDER — SCOLOPAMINE TRANSDERMAL SYSTEM 1 MG/1
1 PATCH, EXTENDED RELEASE TRANSDERMAL CONTINUOUS
Status: CANCELLED | OUTPATIENT
Start: 2022-10-31 | End: 2022-11-03

## 2022-10-31 RX ORDER — GABAPENTIN 100 MG/1
600 CAPSULE ORAL ONCE
Status: CANCELLED | OUTPATIENT
Start: 2022-11-04 | End: 2022-10-31

## 2022-10-31 RX ORDER — ASPIRIN 81 MG/1
81 TABLET ORAL NIGHTLY
COMMUNITY

## 2022-10-31 RX ORDER — ALVIMOPAN 12 MG/1
12 CAPSULE ORAL ONCE
Status: CANCELLED | OUTPATIENT
Start: 2022-11-04 | End: 2022-11-07

## 2022-10-31 RX ORDER — HEPARIN SODIUM 5000 [USP'U]/ML
5000 INJECTION, SOLUTION INTRAVENOUS; SUBCUTANEOUS ONCE
Status: CANCELLED | OUTPATIENT
Start: 2022-11-04

## 2022-10-31 RX ORDER — METRONIDAZOLE 500 MG/1
500 TABLET ORAL SEE ADMIN INSTRUCTIONS
Qty: 4 TABLET | Refills: 0 | Status: SHIPPED | OUTPATIENT
Start: 2022-10-31 | End: 2022-11-02

## 2022-10-31 RX ORDER — ACETAMINOPHEN 500 MG
1000 TABLET ORAL EVERY 6 HOURS
Status: CANCELLED | OUTPATIENT
Start: 2022-11-04

## 2022-11-03 ENCOUNTER — ANESTHESIA EVENT (OUTPATIENT)
Dept: PERIOP | Facility: HOSPITAL | Age: 72
End: 2022-11-03

## 2022-11-04 ENCOUNTER — ANESTHESIA (OUTPATIENT)
Dept: PERIOP | Facility: HOSPITAL | Age: 72
End: 2022-11-04

## 2022-11-04 ENCOUNTER — HOSPITAL ENCOUNTER (INPATIENT)
Facility: HOSPITAL | Age: 72
LOS: 4 days | Discharge: HOME | End: 2022-11-08
Attending: STUDENT IN AN ORGANIZED HEALTH CARE EDUCATION/TRAINING PROGRAM | Admitting: STUDENT IN AN ORGANIZED HEALTH CARE EDUCATION/TRAINING PROGRAM

## 2022-11-04 DIAGNOSIS — Z86.010 HISTORY OF COLON POLYPS: ICD-10-CM

## 2022-11-04 DIAGNOSIS — Z74.09 IMPAIRED FUNCTIONAL MOBILITY, BALANCE, GAIT, AND ENDURANCE: ICD-10-CM

## 2022-11-04 DIAGNOSIS — D37.3 APPENDICEAL TUMOR: Primary | ICD-10-CM

## 2022-11-04 LAB
ABO GROUP BLD: NORMAL
ALBUMIN SERPL-MCNC: 3.7 G/DL (ref 3.5–5.2)
ALBUMIN/GLOB SERPL: 1.6 G/DL
ALP SERPL-CCNC: 64 U/L (ref 39–117)
ALT SERPL W P-5'-P-CCNC: 11 U/L (ref 1–41)
ANION GAP SERPL CALCULATED.3IONS-SCNC: 17 MMOL/L (ref 5–15)
AST SERPL-CCNC: 22 U/L (ref 1–40)
BILIRUB SERPL-MCNC: 1 MG/DL (ref 0–1.2)
BLD GP AB SCN SERPL QL: NEGATIVE
BUN SERPL-MCNC: 16 MG/DL (ref 8–23)
BUN/CREAT SERPL: 9.9 (ref 7–25)
CALCIUM SPEC-SCNC: 8.9 MG/DL (ref 8.6–10.5)
CHLORIDE SERPL-SCNC: 100 MMOL/L (ref 98–107)
CO2 SERPL-SCNC: 20 MMOL/L (ref 22–29)
CREAT SERPL-MCNC: 1.61 MG/DL (ref 0.76–1.27)
DEPRECATED RDW RBC AUTO: 41.5 FL (ref 37–54)
EGFRCR SERPLBLD CKD-EPI 2021: 44.6 ML/MIN/1.73
ERYTHROCYTE [DISTWIDTH] IN BLOOD BY AUTOMATED COUNT: 13.5 % (ref 12.3–15.4)
GLOBULIN UR ELPH-MCNC: 2.3 GM/DL
GLUCOSE BLDC GLUCOMTR-MCNC: 100 MG/DL (ref 70–130)
GLUCOSE BLDC GLUCOMTR-MCNC: 83 MG/DL (ref 70–130)
GLUCOSE SERPL-MCNC: 148 MG/DL (ref 65–99)
HCT VFR BLD AUTO: 39.6 % (ref 37.5–51)
HGB BLD-MCNC: 13.6 G/DL (ref 13–17.7)
INR PPP: 1.24 (ref 0.8–1.2)
Lab: NORMAL
MCH RBC QN AUTO: 29.4 PG (ref 26.6–33)
MCHC RBC AUTO-ENTMCNC: 34.3 G/DL (ref 31.5–35.7)
MCV RBC AUTO: 85.7 FL (ref 79–97)
PLATELET # BLD AUTO: 193 10*3/MM3 (ref 140–450)
PMV BLD AUTO: 9.4 FL (ref 6–12)
POTASSIUM SERPL-SCNC: 3.9 MMOL/L (ref 3.5–5.2)
PROT SERPL-MCNC: 6 G/DL (ref 6–8.5)
PROTHROMBIN TIME: 15.6 SECONDS (ref 11.1–15.3)
RBC # BLD AUTO: 4.62 10*6/MM3 (ref 4.14–5.8)
RH BLD: POSITIVE
SODIUM SERPL-SCNC: 137 MMOL/L (ref 136–145)
T&S EXPIRATION DATE: NORMAL
WBC NRBC COR # BLD: 14.48 10*3/MM3 (ref 3.4–10.8)

## 2022-11-04 PROCEDURE — 25010000002 FENTANYL CITRATE (PF) 100 MCG/2ML SOLUTION: Performed by: NURSE ANESTHETIST, CERTIFIED REGISTERED

## 2022-11-04 PROCEDURE — 25010000002 ALBUMIN HUMAN 5% PER 50 ML: Performed by: NURSE ANESTHETIST, CERTIFIED REGISTERED

## 2022-11-04 PROCEDURE — 86901 BLOOD TYPING SEROLOGIC RH(D): CPT | Performed by: ANESTHESIOLOGY

## 2022-11-04 PROCEDURE — 85610 PROTHROMBIN TIME: CPT | Performed by: STUDENT IN AN ORGANIZED HEALTH CARE EDUCATION/TRAINING PROGRAM

## 2022-11-04 PROCEDURE — 25010000002 CEFOXITIN PER 1 G: Performed by: STUDENT IN AN ORGANIZED HEALTH CARE EDUCATION/TRAINING PROGRAM

## 2022-11-04 PROCEDURE — 44140 PARTIAL REMOVAL OF COLON: CPT | Performed by: STUDENT IN AN ORGANIZED HEALTH CARE EDUCATION/TRAINING PROGRAM

## 2022-11-04 PROCEDURE — 25010000002 HEPARIN (PORCINE) PER 1000 UNITS: Performed by: STUDENT IN AN ORGANIZED HEALTH CARE EDUCATION/TRAINING PROGRAM

## 2022-11-04 PROCEDURE — 25010000002 PROPOFOL 200 MG/20ML EMULSION: Performed by: NURSE ANESTHETIST, CERTIFIED REGISTERED

## 2022-11-04 PROCEDURE — 25010000002 DEXAMETHASONE PER 1 MG: Performed by: NURSE ANESTHETIST, CERTIFIED REGISTERED

## 2022-11-04 PROCEDURE — 0 BUPIVACAINE LIPOSOME 1.3 % SUSPENSION: Performed by: STUDENT IN AN ORGANIZED HEALTH CARE EDUCATION/TRAINING PROGRAM

## 2022-11-04 PROCEDURE — P9041 ALBUMIN (HUMAN),5%, 50ML: HCPCS | Performed by: NURSE ANESTHETIST, CERTIFIED REGISTERED

## 2022-11-04 PROCEDURE — 0DJD4ZZ INSPECTION OF LOWER INTESTINAL TRACT, PERCUTANEOUS ENDOSCOPIC APPROACH: ICD-10-PCS | Performed by: STUDENT IN AN ORGANIZED HEALTH CARE EDUCATION/TRAINING PROGRAM

## 2022-11-04 PROCEDURE — 25010000002 PHENYLEPHRINE 10 MG/ML SOLUTION: Performed by: NURSE ANESTHETIST, CERTIFIED REGISTERED

## 2022-11-04 PROCEDURE — 25010000002 NEOSTIGMINE 10 MG/10ML SOLUTION: Performed by: NURSE ANESTHETIST, CERTIFIED REGISTERED

## 2022-11-04 PROCEDURE — 0DNW4ZZ RELEASE PERITONEUM, PERCUTANEOUS ENDOSCOPIC APPROACH: ICD-10-PCS | Performed by: STUDENT IN AN ORGANIZED HEALTH CARE EDUCATION/TRAINING PROGRAM

## 2022-11-04 PROCEDURE — 0DTF0ZZ RESECTION OF RIGHT LARGE INTESTINE, OPEN APPROACH: ICD-10-PCS | Performed by: STUDENT IN AN ORGANIZED HEALTH CARE EDUCATION/TRAINING PROGRAM

## 2022-11-04 PROCEDURE — 86850 RBC ANTIBODY SCREEN: CPT | Performed by: ANESTHESIOLOGY

## 2022-11-04 PROCEDURE — 25010000002 ONDANSETRON PER 1 MG: Performed by: NURSE ANESTHETIST, CERTIFIED REGISTERED

## 2022-11-04 PROCEDURE — 88309 TISSUE EXAM BY PATHOLOGIST: CPT

## 2022-11-04 PROCEDURE — C9290 INJ, BUPIVACAINE LIPOSOME: HCPCS | Performed by: STUDENT IN AN ORGANIZED HEALTH CARE EDUCATION/TRAINING PROGRAM

## 2022-11-04 PROCEDURE — 25010000002 MIDAZOLAM PER 1 MG: Performed by: NURSE ANESTHETIST, CERTIFIED REGISTERED

## 2022-11-04 PROCEDURE — 82962 GLUCOSE BLOOD TEST: CPT

## 2022-11-04 PROCEDURE — 85027 COMPLETE CBC AUTOMATED: CPT | Performed by: STUDENT IN AN ORGANIZED HEALTH CARE EDUCATION/TRAINING PROGRAM

## 2022-11-04 PROCEDURE — 86900 BLOOD TYPING SEROLOGIC ABO: CPT | Performed by: ANESTHESIOLOGY

## 2022-11-04 PROCEDURE — 80053 COMPREHEN METABOLIC PANEL: CPT | Performed by: STUDENT IN AN ORGANIZED HEALTH CARE EDUCATION/TRAINING PROGRAM

## 2022-11-04 DEVICE — PROXIMATE RELOADABLE LINEAR CUTTER WITH SAFETY LOCK-OUT, 75MM
Type: IMPLANTABLE DEVICE | Site: ABDOMEN | Status: FUNCTIONAL
Brand: PROXIMATE

## 2022-11-04 DEVICE — PROXIMATE LINEAR CUTTER RELOAD, BLUE, 75MM
Type: IMPLANTABLE DEVICE | Site: ABDOMEN | Status: FUNCTIONAL
Brand: PROXIMATE

## 2022-11-04 RX ORDER — NEOSTIGMINE METHYLSULFATE 1 MG/ML
INJECTION, SOLUTION INTRAVENOUS AS NEEDED
Status: DISCONTINUED | OUTPATIENT
Start: 2022-11-04 | End: 2022-11-04 | Stop reason: SURG

## 2022-11-04 RX ORDER — OXYCODONE HYDROCHLORIDE 5 MG/1
5 TABLET ORAL EVERY 4 HOURS PRN
Status: DISCONTINUED | OUTPATIENT
Start: 2022-11-04 | End: 2022-11-08 | Stop reason: HOSPADM

## 2022-11-04 RX ORDER — SCOLOPAMINE TRANSDERMAL SYSTEM 1 MG/1
1 PATCH, EXTENDED RELEASE TRANSDERMAL CONTINUOUS
Status: DISCONTINUED | OUTPATIENT
Start: 2022-11-04 | End: 2022-11-06

## 2022-11-04 RX ORDER — SODIUM CHLORIDE 0.9 % (FLUSH) 0.9 %
10 SYRINGE (ML) INJECTION AS NEEDED
Status: DISCONTINUED | OUTPATIENT
Start: 2022-11-04 | End: 2022-11-08 | Stop reason: HOSPADM

## 2022-11-04 RX ORDER — NALOXONE HCL 0.4 MG/ML
0.4 VIAL (ML) INJECTION AS NEEDED
Status: DISCONTINUED | OUTPATIENT
Start: 2022-11-04 | End: 2022-11-04 | Stop reason: HOSPADM

## 2022-11-04 RX ORDER — SODIUM CHLORIDE, SODIUM GLUCONATE, SODIUM ACETATE, POTASSIUM CHLORIDE AND MAGNESIUM CHLORIDE 526; 502; 368; 37; 30 MG/100ML; MG/100ML; MG/100ML; MG/100ML; MG/100ML
INJECTION, SOLUTION INTRAVENOUS CONTINUOUS PRN
Status: DISCONTINUED | OUTPATIENT
Start: 2022-11-04 | End: 2022-11-04 | Stop reason: SURG

## 2022-11-04 RX ORDER — ONDANSETRON 2 MG/ML
4 INJECTION INTRAMUSCULAR; INTRAVENOUS EVERY 6 HOURS PRN
Status: DISCONTINUED | OUTPATIENT
Start: 2022-11-04 | End: 2022-11-08 | Stop reason: HOSPADM

## 2022-11-04 RX ORDER — ONDANSETRON 2 MG/ML
4 INJECTION INTRAMUSCULAR; INTRAVENOUS ONCE AS NEEDED
Status: DISCONTINUED | OUTPATIENT
Start: 2022-11-04 | End: 2022-11-04 | Stop reason: HOSPADM

## 2022-11-04 RX ORDER — FLUMAZENIL 0.1 MG/ML
0.2 INJECTION INTRAVENOUS AS NEEDED
Status: DISCONTINUED | OUTPATIENT
Start: 2022-11-04 | End: 2022-11-04 | Stop reason: HOSPADM

## 2022-11-04 RX ORDER — PROPOFOL 10 MG/ML
INJECTION, EMULSION INTRAVENOUS AS NEEDED
Status: DISCONTINUED | OUTPATIENT
Start: 2022-11-04 | End: 2022-11-04 | Stop reason: SURG

## 2022-11-04 RX ORDER — ACETAMINOPHEN 500 MG
1000 TABLET ORAL EVERY 6 HOURS
Status: DISPENSED | OUTPATIENT
Start: 2022-11-04 | End: 2022-11-06

## 2022-11-04 RX ORDER — FAMOTIDINE 10 MG/ML
20 INJECTION, SOLUTION INTRAVENOUS 2 TIMES DAILY
Status: DISCONTINUED | OUTPATIENT
Start: 2022-11-04 | End: 2022-11-08 | Stop reason: HOSPADM

## 2022-11-04 RX ORDER — METOPROLOL SUCCINATE 25 MG/1
25 TABLET, EXTENDED RELEASE ORAL NIGHTLY
Status: DISCONTINUED | OUTPATIENT
Start: 2022-11-04 | End: 2022-11-08 | Stop reason: HOSPADM

## 2022-11-04 RX ORDER — ONDANSETRON 2 MG/ML
INJECTION INTRAMUSCULAR; INTRAVENOUS AS NEEDED
Status: DISCONTINUED | OUTPATIENT
Start: 2022-11-04 | End: 2022-11-04 | Stop reason: SURG

## 2022-11-04 RX ORDER — SODIUM CHLORIDE 9 MG/ML
1000 INJECTION, SOLUTION INTRAVENOUS CONTINUOUS
Status: DISCONTINUED | OUTPATIENT
Start: 2022-11-04 | End: 2022-11-04

## 2022-11-04 RX ORDER — PHENYLEPHRINE HYDROCHLORIDE 10 MG/ML
INJECTION INTRAVENOUS AS NEEDED
Status: DISCONTINUED | OUTPATIENT
Start: 2022-11-04 | End: 2022-11-04 | Stop reason: SURG

## 2022-11-04 RX ORDER — ACETAMINOPHEN 500 MG
1000 TABLET ORAL EVERY 6 HOURS
Status: DISCONTINUED | OUTPATIENT
Start: 2022-11-04 | End: 2022-11-04 | Stop reason: HOSPADM

## 2022-11-04 RX ORDER — ONDANSETRON 4 MG/1
4 TABLET, FILM COATED ORAL EVERY 6 HOURS PRN
Status: DISCONTINUED | OUTPATIENT
Start: 2022-11-04 | End: 2022-11-08 | Stop reason: HOSPADM

## 2022-11-04 RX ORDER — ALBUMIN, HUMAN INJ 5% 5 %
SOLUTION INTRAVENOUS CONTINUOUS PRN
Status: DISCONTINUED | OUTPATIENT
Start: 2022-11-04 | End: 2022-11-04 | Stop reason: SURG

## 2022-11-04 RX ORDER — EPHEDRINE SULFATE 50 MG/ML
5 INJECTION, SOLUTION INTRAVENOUS ONCE AS NEEDED
Status: DISCONTINUED | OUTPATIENT
Start: 2022-11-04 | End: 2022-11-04 | Stop reason: HOSPADM

## 2022-11-04 RX ORDER — ALVIMOPAN 12 MG/1
12 CAPSULE ORAL ONCE
Status: COMPLETED | OUTPATIENT
Start: 2022-11-04 | End: 2022-11-04

## 2022-11-04 RX ORDER — SODIUM CHLORIDE 0.9 % (FLUSH) 0.9 %
10 SYRINGE (ML) INJECTION EVERY 12 HOURS SCHEDULED
Status: DISCONTINUED | OUTPATIENT
Start: 2022-11-04 | End: 2022-11-08 | Stop reason: HOSPADM

## 2022-11-04 RX ORDER — MIDAZOLAM HYDROCHLORIDE 1 MG/ML
INJECTION INTRAMUSCULAR; INTRAVENOUS AS NEEDED
Status: DISCONTINUED | OUTPATIENT
Start: 2022-11-04 | End: 2022-11-04 | Stop reason: SURG

## 2022-11-04 RX ORDER — ROCURONIUM BROMIDE 10 MG/ML
INJECTION, SOLUTION INTRAVENOUS AS NEEDED
Status: DISCONTINUED | OUTPATIENT
Start: 2022-11-04 | End: 2022-11-04 | Stop reason: SURG

## 2022-11-04 RX ORDER — DEXTROSE, SODIUM CHLORIDE, AND POTASSIUM CHLORIDE 5; .45; .15 G/100ML; G/100ML; G/100ML
75 INJECTION INTRAVENOUS CONTINUOUS
Status: DISCONTINUED | OUTPATIENT
Start: 2022-11-04 | End: 2022-11-05

## 2022-11-04 RX ORDER — ALVIMOPAN 12 MG/1
12 CAPSULE ORAL 2 TIMES DAILY
Status: DISCONTINUED | OUTPATIENT
Start: 2022-11-05 | End: 2022-11-07

## 2022-11-04 RX ORDER — DEXAMETHASONE SODIUM PHOSPHATE 4 MG/ML
INJECTION, SOLUTION INTRA-ARTICULAR; INTRALESIONAL; INTRAMUSCULAR; INTRAVENOUS; SOFT TISSUE AS NEEDED
Status: DISCONTINUED | OUTPATIENT
Start: 2022-11-04 | End: 2022-11-04 | Stop reason: SURG

## 2022-11-04 RX ORDER — MELOXICAM 15 MG/1
15 TABLET ORAL ONCE
Status: COMPLETED | OUTPATIENT
Start: 2022-11-04 | End: 2022-11-04

## 2022-11-04 RX ORDER — GABAPENTIN 100 MG/1
600 CAPSULE ORAL ONCE
Status: COMPLETED | OUTPATIENT
Start: 2022-11-04 | End: 2022-11-04

## 2022-11-04 RX ORDER — HEPARIN SODIUM 5000 [USP'U]/ML
5000 INJECTION, SOLUTION INTRAVENOUS; SUBCUTANEOUS EVERY 12 HOURS SCHEDULED
Status: DISCONTINUED | OUTPATIENT
Start: 2022-11-05 | End: 2022-11-08 | Stop reason: HOSPADM

## 2022-11-04 RX ORDER — HEPARIN SODIUM 5000 [USP'U]/ML
5000 INJECTION, SOLUTION INTRAVENOUS; SUBCUTANEOUS ONCE
Status: COMPLETED | OUTPATIENT
Start: 2022-11-04 | End: 2022-11-04

## 2022-11-04 RX ORDER — FENTANYL CITRATE 50 UG/ML
INJECTION, SOLUTION INTRAMUSCULAR; INTRAVENOUS AS NEEDED
Status: DISCONTINUED | OUTPATIENT
Start: 2022-11-04 | End: 2022-11-04 | Stop reason: SURG

## 2022-11-04 RX ADMIN — FENTANYL CITRATE 50 MCG: 50 INJECTION INTRAMUSCULAR; INTRAVENOUS at 12:12

## 2022-11-04 RX ADMIN — GABAPENTIN 600 MG: 300 CAPSULE ORAL at 10:10

## 2022-11-04 RX ADMIN — FAMOTIDINE 20 MG: 10 INJECTION, SOLUTION INTRAVENOUS at 20:53

## 2022-11-04 RX ADMIN — FENTANYL CITRATE 25 MCG: 50 INJECTION INTRAMUSCULAR; INTRAVENOUS at 11:15

## 2022-11-04 RX ADMIN — Medication 10 ML: at 20:58

## 2022-11-04 RX ADMIN — ROCURONIUM BROMIDE 10 MG: 50 INJECTION INTRAVENOUS at 12:00

## 2022-11-04 RX ADMIN — ONDANSETRON 4 MG: 2 INJECTION INTRAMUSCULAR; INTRAVENOUS at 13:26

## 2022-11-04 RX ADMIN — ROCURONIUM BROMIDE 50 MG: 50 INJECTION INTRAVENOUS at 10:31

## 2022-11-04 RX ADMIN — SODIUM CHLORIDE, SODIUM GLUCONATE, SODIUM ACETATE, POTASSIUM CHLORIDE AND MAGNESIUM CHLORIDE: 526; 502; 368; 37; 30 INJECTION, SOLUTION INTRAVENOUS at 10:34

## 2022-11-04 RX ADMIN — HEPARIN SODIUM 5000 UNITS: 5000 INJECTION, SOLUTION INTRAVENOUS; SUBCUTANEOUS at 10:13

## 2022-11-04 RX ADMIN — PHENYLEPHRINE HYDROCHLORIDE 100 MCG: 10 INJECTION, SOLUTION INTRAVENOUS at 12:55

## 2022-11-04 RX ADMIN — FENTANYL CITRATE 50 MCG: 50 INJECTION INTRAMUSCULAR; INTRAVENOUS at 10:28

## 2022-11-04 RX ADMIN — ALVIMOPAN 12 MG: 12 CAPSULE ORAL at 10:10

## 2022-11-04 RX ADMIN — SODIUM CHLORIDE, SODIUM GLUCONATE, SODIUM ACETATE, POTASSIUM CHLORIDE AND MAGNESIUM CHLORIDE: 526; 502; 368; 37; 30 INJECTION, SOLUTION INTRAVENOUS at 13:13

## 2022-11-04 RX ADMIN — PHENYLEPHRINE HYDROCHLORIDE 100 MCG: 10 INJECTION, SOLUTION INTRAVENOUS at 12:49

## 2022-11-04 RX ADMIN — SCOLOPAMINE TRANSDERMAL SYSTEM 1 PATCH: 1 PATCH, EXTENDED RELEASE TRANSDERMAL at 10:09

## 2022-11-04 RX ADMIN — FENTANYL CITRATE 25 MCG: 50 INJECTION INTRAMUSCULAR; INTRAVENOUS at 12:02

## 2022-11-04 RX ADMIN — POTASSIUM CHLORIDE, DEXTROSE MONOHYDRATE AND SODIUM CHLORIDE 75 ML/HR: 150; 5; 450 INJECTION, SOLUTION INTRAVENOUS at 20:54

## 2022-11-04 RX ADMIN — MIDAZOLAM 0.5 MG: 1 INJECTION, SOLUTION INTRAMUSCULAR; INTRAVENOUS at 10:28

## 2022-11-04 RX ADMIN — PROPOFOL 100 MG: 10 INJECTION, EMULSION INTRAVENOUS at 10:30

## 2022-11-04 RX ADMIN — DEXAMETHASONE SODIUM PHOSPHATE 4 MG: 4 INJECTION, SOLUTION INTRAMUSCULAR; INTRAVENOUS at 11:05

## 2022-11-04 RX ADMIN — GLYCOPYRROLATE 0.5 MCG: 0.2 INJECTION, SOLUTION INTRAMUSCULAR; INTRAVITREAL at 13:40

## 2022-11-04 RX ADMIN — SODIUM CHLORIDE, SODIUM GLUCONATE, SODIUM ACETATE, POTASSIUM CHLORIDE AND MAGNESIUM CHLORIDE: 526; 502; 368; 37; 30 INJECTION, SOLUTION INTRAVENOUS at 11:04

## 2022-11-04 RX ADMIN — NEOSTIGMINE METHYLSULFATE 3 MG: 0.5 INJECTION INTRAVENOUS at 13:40

## 2022-11-04 RX ADMIN — ROCURONIUM BROMIDE 10 MG: 50 INJECTION INTRAVENOUS at 11:18

## 2022-11-04 RX ADMIN — METOPROLOL SUCCINATE 25 MG: 25 TABLET, FILM COATED, EXTENDED RELEASE ORAL at 20:53

## 2022-11-04 RX ADMIN — MIDAZOLAM 0.5 MG: 1 INJECTION, SOLUTION INTRAMUSCULAR; INTRAVENOUS at 10:23

## 2022-11-04 RX ADMIN — FENTANYL CITRATE 50 MCG: 50 INJECTION INTRAMUSCULAR; INTRAVENOUS at 11:58

## 2022-11-04 RX ADMIN — Medication 2 G: at 10:46

## 2022-11-04 RX ADMIN — Medication 2 G: at 12:46

## 2022-11-04 RX ADMIN — ACETAMINOPHEN 1000 MG: 500 TABLET ORAL at 10:09

## 2022-11-04 RX ADMIN — MELOXICAM 15 MG: 15 TABLET ORAL at 10:10

## 2022-11-04 RX ADMIN — SODIUM CHLORIDE 1000 ML: 9 INJECTION, SOLUTION INTRAVENOUS at 10:08

## 2022-11-04 RX ADMIN — ALBUMIN HUMAN: 0.05 INJECTION, SOLUTION INTRAVENOUS at 12:42

## 2022-11-04 RX ADMIN — ROCURONIUM BROMIDE 20 MG: 50 INJECTION INTRAVENOUS at 12:43

## 2022-11-04 NOTE — ANESTHESIA PROCEDURE NOTES
Airway  Urgency: elective    Date/Time: 11/4/2022 10:33 AM  Airway not difficult    General Information and Staff    Patient location during procedure: OR  CRNA/CAA: Elsa Serna CRNA    Indications and Patient Condition  Indications for airway management: airway protection    Preoxygenated: yes  MILS not maintained throughout  Mask difficulty assessment: 2 - vent by mask + OA or adjuvant +/- NMBA    Final Airway Details  Final airway type: endotracheal airway      Successful airway: ETT  Cuffed: yes   Successful intubation technique: direct laryngoscopy  Facilitating devices/methods: intubating stylet  Endotracheal tube insertion site: oral  Blade: Tessy  Blade size: 3  ETT size (mm): 7.5  Cormack-Lehane Classification: grade I - full view of glottis  Placement verified by: chest auscultation and capnometry   Measured from: lips  ETT/EBT  to lips (cm): 22  Number of attempts at approach: 1  Assessment: lips, teeth, and gum same as pre-op and atraumatic intubation

## 2022-11-04 NOTE — ANESTHESIA PREPROCEDURE EVALUATION
Anesthesia Evaluation     Patient summary reviewed and Nursing notes reviewed   no history of anesthetic complications:  NPO Solid Status: > 8 hours  NPO Liquid Status: > 2 hours           Airway   Mallampati: II  TM distance: >3 FB  Neck ROM: limited  No difficulty expected  Dental    (+) partials        Pulmonary     breath sounds clear to auscultation  (+) asthma,  (-) shortness of breath, rhonchi, decreased breath sounds, wheezes, not a smoker, no home oxygen  Cardiovascular   Exercise tolerance: good (4-7 METS)    ECG reviewed  Patient on routine beta blocker and Beta blocker given within 24 hours of surgery  Rhythm: regular  Rate: normal    (+) hypertension 2 medications or greater, CHF Diastolic >=55%, PVD, hyperlipidemia,   (-) pacemaker, past MI, dysrhythmias, angina, murmur, cardiac stents, CABG, DVT    ROS comment: EKG 10/31/2022:  Sinus rhythm with 1st degree AV block  Otherwise normal ECG  When compared with ECG of 25-APR-2018 08:39,  Borderline criteria for Anterior infarct are no longer Present  T wave inversion no longer evident in Inferior leads    Left heart cath 7/12/2018:  Findings:  Left main coronary artery: This was a patent vessel with minor plaques which divided into a left anterior descending coronary artery, ramus intermedius coronary artery and left circumflex coronary artery  Left anterior descending coronary artery: This was a medium-sized vessel with minor luminal irregularities and calcification in the proximal segment, mild ectatic segment in the mid Left Anterior Descending Coronary Artery with minor calcification and at the point of origin of diagonal 2 there was an eccentric noncritical disease up to 40%.  The mid and distal LAD was a small caliber vessel.  Diagonal 1 was a patent vessel.  No flow-limiting lesions noted in the LAD  Ramus intermedius coronary artery: This was a medium size vessel which was patent  Left circumflex coronary artery : This was a medium-sized tortuous  vessel which was patent with minor luminal irregularities  Right Coronary Artery: This was a dominant vessel with minor plaques and calcification the proximal and midsegment with no flow-limiting lesions.  Noncritical disease up to 20-30% was noted in the mid RCA and distal RCA had minor plaques.  PDA and PLV branches were patent.     Left ventriculogram: Left ventricular systolic function was normal with an ejection fraction of 55% with no wall motion abnormalities.  Aortic pressure was 110/55 and left ventricular pressure was 118/6 mmHg.     Impression: Normal left ventricular systolic function with no wall motion abnormalities.  Estimated ejection fraction was 55%.  Noncritical lesions noted in the coronary arteries as described above.  Chest pain most likely noncardiac.    TTE 7/2/2018:  ? Left ventricular systolic function is normal. Estimated EF = 56%.  ? Left ventricular diastolic dysfunction (grade I) consistent with impaired relaxation.    Holter monitor 6/12/2018:  Impression: Holter recording showing sinus rhythm with first-degree AV block.  One short nonsustained run of most likely supraventricular rhythm with aberrancy.  No symptoms are reported by the patient.    CT Coronary angiogram 6/12/2018:  IMPRESSION:  CONCLUSION: Abnormal CT coronary arteriogram.  1. Extensive calcified plaque proximal and mid LAD. There is at  least one focal area of significant stenosis of between 70-75% in  the more distal LAD.  2. Extensive plaque throughout the right coronary artery. The  right coronary artery is chronically occluded at its origin with  some faint visualization of contrast in the distal right coronary  artery and PDA apparently via collaterals.  3. Circumflex/OM1 are normal.     Calcium score 297. Moderate risk for coronary artery disease.  Normal functional analysis. Computer-assisted calculation of left  ventricular ejection fraction 69%.    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 After all the tests (results above) it was found that the angina was really muscle spasm from statins.     Hx of asthma: Rarely uses albuterol inhaler. Hasn't used in 3 months.    S/p colon CA 2007 at that time had a colectomy    Prediabetic: last [id number]=3.7    Pt placed on plaquenil for rheumatoid arthritis. Has some pain in shoulders and neck        Phys Exam Other: Limited ROM of cervical spine due to rheumatoid arthritis.   Partial on the top- currently out              Anesthesia Plan    ASA 3     general     (Hgb=16.4, T&S  Only had broth yesterday. Started fluids in preop  Another IV after induction)  intravenous induction     Anesthetic plan, risks, benefits, and alternatives have been provided, discussed and informed consent has been obtained with: patient and spouse/significant other.  Pre-procedure education provided  Use of blood products discussed with patient  Consented to blood products.   Plan discussed with CRNA.        CODE STATUS:

## 2022-11-04 NOTE — INTERVAL H&P NOTE
H&P reviewed. The patient was examined and there are no changes to the H&P.    Vitals:    11/04/22 0937   BP: 132/68   Pulse: 94   Resp: 21   Temp: 97.7 °F (36.5 °C)   SpO2: 94%      I have counseled the patient about the nature of the problem, the natural history of the disease, the risk and benefits of surgery, the expected recovery, and the alternatives to surgery.  After this discussion, they were given an opportunity to ask questions, have an understanding of diagnosis and treatment options, and wish to proceed with surgery.

## 2022-11-04 NOTE — ANESTHESIA POSTPROCEDURE EVALUATION
Patient: Henrik Sands    Procedure Summary     Date: 11/04/22 Room / Location: [facility] OR 03 / [facility] OR    Anesthesia Start: 1023 Anesthesia Stop:     Procedure: LAPAROSCOPIC TO OPEN RIGHT HEMICOLECTOMY AND LYSIS OF ADHESIONS (Abdomen) Diagnosis:       History of colon polyps      (History of colon polyps [Z86.010])    Surgeons: Sylvester Fowler MD Provider: Ana Laura Dc DO    Anesthesia Type: general ASA Status: 3          Anesthesia Type: general    Vitals  No vitals data found for the desired time range.          Post Anesthesia Care and Evaluation    Patient location during evaluation: PACU  Patient participation: complete - patient cannot participate  Level of consciousness: sleepy but conscious  Pain score: 0    Airway patency: patent  Anesthetic complications: No anesthetic complications  PONV Status: none  Cardiovascular status: acceptable  Respiratory status: acceptable, spontaneous ventilation and face mask  Hydration status: acceptable    Comments: /61, T-97.8, HR 82, sat 96%, RR 12, mildly snoring and comfortable

## 2022-11-04 NOTE — PLAN OF CARE
Problem: Adult Inpatient Plan of Care  Goal: Plan of Care Review  Flowsheets (Taken 11/4/2022 1807)  Progress: improving  Plan of Care Reviewed With: patient  Outcome Evaluation: vss, resting between care, spouse at bedside   Goal Outcome Evaluation:  Plan of Care Reviewed With: patient        Progress: improving  Outcome Evaluation: vss, resting between care, spouse at bedside

## 2022-11-05 LAB
ANION GAP SERPL CALCULATED.3IONS-SCNC: 9 MMOL/L (ref 5–15)
BASOPHILS # BLD AUTO: 0.03 10*3/MM3 (ref 0–0.2)
BASOPHILS NFR BLD AUTO: 0.2 % (ref 0–1.5)
BUN SERPL-MCNC: 18 MG/DL (ref 8–23)
BUN/CREAT SERPL: 13.7 (ref 7–25)
CALCIUM SPEC-SCNC: 9 MG/DL (ref 8.6–10.5)
CHLORIDE SERPL-SCNC: 102 MMOL/L (ref 98–107)
CO2 SERPL-SCNC: 26 MMOL/L (ref 22–29)
CREAT SERPL-MCNC: 1.31 MG/DL (ref 0.76–1.27)
DEPRECATED RDW RBC AUTO: 42.1 FL (ref 37–54)
EGFRCR SERPLBLD CKD-EPI 2021: 57.1 ML/MIN/1.73
EOSINOPHIL # BLD AUTO: 0 10*3/MM3 (ref 0–0.4)
EOSINOPHIL NFR BLD AUTO: 0 % (ref 0.3–6.2)
ERYTHROCYTE [DISTWIDTH] IN BLOOD BY AUTOMATED COUNT: 13.6 % (ref 12.3–15.4)
GLUCOSE SERPL-MCNC: 146 MG/DL (ref 65–99)
HCT VFR BLD AUTO: 39.8 % (ref 37.5–51)
HGB BLD-MCNC: 13.5 G/DL (ref 13–17.7)
IMM GRANULOCYTES # BLD AUTO: 0.05 10*3/MM3 (ref 0–0.05)
IMM GRANULOCYTES NFR BLD AUTO: 0.4 % (ref 0–0.5)
LYMPHOCYTES # BLD AUTO: 0.71 10*3/MM3 (ref 0.7–3.1)
LYMPHOCYTES NFR BLD AUTO: 5.4 % (ref 19.6–45.3)
MAGNESIUM SERPL-MCNC: 1.7 MG/DL (ref 1.6–2.4)
MCH RBC QN AUTO: 29 PG (ref 26.6–33)
MCHC RBC AUTO-ENTMCNC: 33.9 G/DL (ref 31.5–35.7)
MCV RBC AUTO: 85.4 FL (ref 79–97)
MONOCYTES # BLD AUTO: 1.59 10*3/MM3 (ref 0.1–0.9)
MONOCYTES NFR BLD AUTO: 12.1 % (ref 5–12)
NEUTROPHILS NFR BLD AUTO: 10.77 10*3/MM3 (ref 1.7–7)
NEUTROPHILS NFR BLD AUTO: 81.9 % (ref 42.7–76)
NRBC BLD AUTO-RTO: 0 /100 WBC (ref 0–0.2)
PHOSPHATE SERPL-MCNC: 2.7 MG/DL (ref 2.5–4.5)
PLATELET # BLD AUTO: 196 10*3/MM3 (ref 140–450)
PMV BLD AUTO: 9.5 FL (ref 6–12)
POTASSIUM SERPL-SCNC: 4.1 MMOL/L (ref 3.5–5.2)
RBC # BLD AUTO: 4.66 10*6/MM3 (ref 4.14–5.8)
SODIUM SERPL-SCNC: 137 MMOL/L (ref 136–145)
WBC NRBC COR # BLD: 13.15 10*3/MM3 (ref 3.4–10.8)

## 2022-11-05 PROCEDURE — 97162 PT EVAL MOD COMPLEX 30 MIN: CPT

## 2022-11-05 PROCEDURE — 85025 COMPLETE CBC W/AUTO DIFF WBC: CPT | Performed by: STUDENT IN AN ORGANIZED HEALTH CARE EDUCATION/TRAINING PROGRAM

## 2022-11-05 PROCEDURE — 25010000002 HEPARIN (PORCINE) PER 1000 UNITS: Performed by: STUDENT IN AN ORGANIZED HEALTH CARE EDUCATION/TRAINING PROGRAM

## 2022-11-05 PROCEDURE — 84100 ASSAY OF PHOSPHORUS: CPT | Performed by: STUDENT IN AN ORGANIZED HEALTH CARE EDUCATION/TRAINING PROGRAM

## 2022-11-05 PROCEDURE — 99024 POSTOP FOLLOW-UP VISIT: CPT | Performed by: SURGERY

## 2022-11-05 PROCEDURE — 83735 ASSAY OF MAGNESIUM: CPT | Performed by: STUDENT IN AN ORGANIZED HEALTH CARE EDUCATION/TRAINING PROGRAM

## 2022-11-05 PROCEDURE — 80048 BASIC METABOLIC PNL TOTAL CA: CPT | Performed by: STUDENT IN AN ORGANIZED HEALTH CARE EDUCATION/TRAINING PROGRAM

## 2022-11-05 RX ADMIN — ALVIMOPAN 12 MG: 12 CAPSULE ORAL at 22:46

## 2022-11-05 RX ADMIN — FAMOTIDINE 20 MG: 10 INJECTION, SOLUTION INTRAVENOUS at 21:07

## 2022-11-05 RX ADMIN — HEPARIN SODIUM 5000 UNITS: 5000 INJECTION INTRAVENOUS; SUBCUTANEOUS at 21:07

## 2022-11-05 RX ADMIN — ALVIMOPAN 12 MG: 12 CAPSULE ORAL at 11:12

## 2022-11-05 RX ADMIN — ACETAMINOPHEN 1000 MG: 500 TABLET ORAL at 05:42

## 2022-11-05 RX ADMIN — ACETAMINOPHEN 1000 MG: 500 TABLET ORAL at 11:12

## 2022-11-05 RX ADMIN — Medication 10 ML: at 08:21

## 2022-11-05 RX ADMIN — FAMOTIDINE 20 MG: 10 INJECTION, SOLUTION INTRAVENOUS at 08:21

## 2022-11-05 RX ADMIN — METOPROLOL SUCCINATE 25 MG: 25 TABLET, FILM COATED, EXTENDED RELEASE ORAL at 21:07

## 2022-11-05 RX ADMIN — ACETAMINOPHEN 1000 MG: 500 TABLET ORAL at 18:05

## 2022-11-05 RX ADMIN — HEPARIN SODIUM 5000 UNITS: 5000 INJECTION INTRAVENOUS; SUBCUTANEOUS at 08:21

## 2022-11-05 NOTE — PLAN OF CARE
Problem: Adult Inpatient Plan of Care  Goal: Plan of Care Review  Recent Flowsheet Documentation  Taken 11/5/2022 1355 by Sujata Man PT  Plan of Care Reviewed With:   patient   spouse  Outcome Evaluation:   PT evaluation completed. Pt in recliner   wife present and supportive. Both verify RN's report pt has recently walked with nursing around the unit. Pt agreeable to therapy. Pt transferred sit to stand with CGA and ambulated without device 200ft with CGA. Pt veered at times from side to side(likely more due to visual deficit than decreased balance but some unsteadiness. Function limited by decreased strength, balance, and tolerance for functional mobility and activities. Pt will benefit from PT to regain lost function. Anticipate home with assistance at discharge   Goal Outcome Evaluation:  Plan of Care Reviewed With: patient, spouse           Outcome Evaluation: PT evaluation completed. Pt in recliner;wife present and supportive. Both verify RN's report pt has recently walked with nursing around the unit. Pt agreeable to therapy. Pt transferred sit to stand with CGA and ambulated without device 200ft with CGA. Pt veered at times from side to side(likely more due to visual deficit than decreased balance but some unsteadiness. Function limited by decreased strength, balance, and tolerance for functional mobility and activities. Pt will benefit from PT to regain lost function. Anticipate home with assistance at discharge

## 2022-11-05 NOTE — PROGRESS NOTES
GENERAL SURGERY PROGRESS NOTE     LOS: 1 day       Interval History:     No acute events overnight.  Patient tolerating liquid diet and endorses passing flatus.  His pain is well controlled.    Medication Review:   acetaminophen, 1,000 mg, Oral, Q6H  alvimopan, 12 mg, Oral, BID  famotidine, 20 mg, Intravenous, BID  heparin (porcine), 5,000 Units, Subcutaneous, Q12H  metoprolol succinate XL, 25 mg, Oral, Nightly  sodium chloride, 10 mL, Intravenous, Q12H        Scopolamine, 1 patch    Objective     Vital Signs:  Temp:  [96.6 °F (35.9 °C)-99.1 °F (37.3 °C)] 99.1 °F (37.3 °C)  Heart Rate:  [67-92] 79  Resp:  [18-20] 20  BP: (111-151)/(54-68) 151/68    Intake/Output Summary (Last 24 hours) at 11/5/2022 1049  Last data filed at 11/5/2022 0349  Gross per 24 hour   Intake 3350 ml   Output 1745 ml   Net 1605 ml       Physical Exam  Constitutional:       Appearance: Normal appearance.   HENT:      Head: Normocephalic and atraumatic.   Cardiovascular:      Rate and Rhythm: Normal rate and regular rhythm.   Pulmonary:      Effort: Pulmonary effort is normal. No respiratory distress.   Abdominal:      General: There is no distension.      Palpations: Abdomen is soft.      Comments: Appropriately tender to palpation without rebound or guarding   Neurological:      General: No focal deficit present.      Mental Status: He is alert and oriented to person, place, and time.         Results Review:    Results from last 7 days   Lab Units 11/05/22  0640 11/04/22  1605 10/31/22  1051   SODIUM mmol/L 137 137 137   POTASSIUM mmol/L 4.1 3.9 3.8   CHLORIDE mmol/L 102 100 99   CO2 mmol/L 26.0 20.0* 26.0   BUN mg/dL 18 16 20   CREATININE mg/dL 1.31* 1.61* 1.33*   GLUCOSE mg/dL 146* 148* 90   CALCIUM mg/dL 9.0 8.9 10.0     Results from last 7 days   Lab Units 11/05/22  0640 11/04/22  1605 10/31/22  1051   WBC 10*3/mm3 13.15* 14.48* 5.75   HEMOGLOBIN g/dL 13.5 13.6 16.4   HEMATOCRIT % 39.8 39.6 47.6   PLATELETS 10*3/mm3 196 193 215        Assessment:    Appendiceal tumor    History of colon polyps      Plan:    Advance to GI soft diet and discontinue Germain.  Encourage ambulation.        This document has been electronically signed by Jorge A Mason MD on November 5, 2022 10:49 CDT

## 2022-11-05 NOTE — PLAN OF CARE
Goal Outcome Evaluation:  Plan of Care Reviewed With: patient        Progress: no change  Outcome Evaluation: VSS. Rested well. No complaints of pain.

## 2022-11-05 NOTE — PLAN OF CARE
Problem: Adult Inpatient Plan of Care  Goal: Plan of Care Review  Flowsheets (Taken 11/5/2022 1505)  Progress: improving  Plan of Care Reviewed With: patient  Outcome Evaluation: vss, resting between care, d/c carr, ambulating in [place], tolerating diet, incision open to air     Problem: Adult Inpatient Plan of Care  Goal: Plan of Care Review  Flowsheets (Taken 11/5/2022 1505)  Progress: improving  Plan of Care Reviewed With: patient  Outcome Evaluation: vss, resting between care, d/c carr, ambulating in [place], tolerating diet, incision open to air   Goal Outcome Evaluation:  Plan of Care Reviewed With: patient        Progress: improving  Outcome Evaluation: vss, resting between care, d/c carr, ambulating in [place], tolerating diet, incision open to air

## 2022-11-05 NOTE — THERAPY EVALUATION
"Patient Name: Henrik Sands  : 1950    MRN: [id number]                              Today's Date: 2022       Admit Date: 2022    Visit Dx:     ICD-10-CM ICD-9-CM   1. History of colon polyps  Z86.010 V12.72   2. Impaired functional mobility, balance, gait, and endurance  Z74.09 V49.89     Patient Active Problem List   Diagnosis   • Bruit of left carotid artery   • General medical exam   • Screening for prostate cancer   • Essential hypertension   • Mixed hyperlipidemia   • Malaise   • Dyspnea on exertion   • Chest pain   • Palpitation   • Vasovagal syncope   • Angina pectoris (HCC)   • Blind in both eyes   • Need for vaccination   • Cellulitis and abscess of right leg   • Inguinal pain   • Diarrhea   • History of colon cancer   • History of colon polyps   • Incontinence of feces with fecal urgency   • Appendiceal tumor     Past Medical History:   Diagnosis Date   • Abdominal bloating    • After-cataract with vision obscured     left   • Allergic rhinitis    • Arthritis     RA   • Artificial lens present     both   • Asthma     \"mild\"   • Colon cancer (HCC)    • Colonic polyp     Polyp of large intestine - multiple      • Cranial nerve disorder, unspecified      right 3rd x 10days to 2wks      • Diabetes mellitus (HCC)    • Disorder of skin     lesion to right forehead      • Essential hypertension     which may be renovascular in origin      • MEL (generalized anxiety disorder)    • Generalized colicky abdominal pain    • GERD (gastroesophageal reflux disease)    • History of colonic polyps    • History of echocardiogram 2008    normal systolic function,minimal eft atrial enlargement,aortic root upper limits of normal   • History of malignant neoplasm of colon    • Hyperlipidemia    • Hypokalemia     persistent   • Ischemic optic neuropathy of both eyes    • Kidney stone     with hyperoxaluria now for 1st time      • Long term use of drug    • Malaise and fatigue    • Neoplasm of " uncertain behavior of skin    • 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   • Osteoarthritis    • Pain in lower limb    • Primary malignant neoplasm of colon (HCC)    • Pseudophakia    • Renal impairment     stage 1   • Skin cancer     basal cell   • Stasis edema    • Type 2 diabetes mellitus (HCC)     no BDR   • Vitamin D deficiency     on treatment     Past Surgical History:   Procedure Laterality Date   • CARDIAC CATHETERIZATION  04/25/2008    selective coronary angiography,right iliofemoral,mild nonobstructive CAD,potential for dilated ascending aorta given the necessity of using a JL5   • CARDIAC CATHETERIZATION N/A 7/19/2018    Procedure: Left Heart Cath/ PCI if indicated;  Surgeon: Erik So MD;  Location: [facility] CATH INVASIVE LOCATION;  Service: Cardiovascular   • CATARACT EXTRACTION  06/18/2014    AFTER CATARACT LASER SURGERY 45345 (1)      • CATARACT EXTRACTION W/ INTRAOCULAR LENS IMPLANT Left 12/19/2006   • COLECTOMY PARTIAL / TOTAL  01/31/2007    low anterior resection of the colon with stapled colorectal anastomosis.Middle rectal cancer w/suspected sigmoid serosal implants at the rectosigmoid junction   • COLONOSCOPY N/A 9/21/2022    Procedure: COLONOSCOPY--bx;  Surgeon: Nehemiah Ramirez MD;  Location: [facility] ENDOSCOPY;  Service: Gastroenterology;  Laterality: N/A;   • COLONOSCOPY W/ POLYPECTOMY  02/11/2016    Patent end-to-end colo-colonic anastomosis.Melanosis in the colon.One 2 mm polyp at 85 cm prox.to anus.Resected and retrieved.One 5 mm polyp at 80 cm prox to anus.Resected and retrieved.One 1 mm polyp at 40 cm prox. to anus.Resected and retrieved.   • EXCISION LESION  11/06/2001    Electrodesiccation and curettage x 3, right upper back   • EXTRACORPOREAL SHOCK WAVE LITHOTRIPSY (ESWL)  08/24/2005    right extracorporeal shock wave 1000 shocks at an energy level 9.Bilateral ureteral calculus,right one symptomatic.Stone 5 x7   • MEDIPORT INSERTION, SINGLE  04/11/2007    left subclavin Mediport,metastatic  colon cancer      General Information     Row Name 11/05/22 [facility]          Physical Therapy Time and Intention    Document Type evaluation  -     Mode of Treatment physical therapy;individual therapy  -     Row Name 11/05/22 135          General Information    Patient Profile Reviewed yes  -ALLIE     Prior Level of Function independent:;all household mobility  -ALLIE     Existing Precautions/Restrictions fall;other (see comments)  pt is legally blind  -     Barriers to Rehab visual deficit;hearing deficit  -ALLIE     Row Name 11/05/22 1355          Living Environment    People in Home spouse  -ALLIE     Row Name 11/05/22 [facility]          Home Main Entrance    Number of Stairs, Main Entrance one  -ALLIE     Stair Railings, Main Entrance none  -ALLIE     Row Name 11/05/22 1355          Stairs Within Home, Primary    Number of Stairs, Within Home, Primary none  -ALLIE     Row Name 11/05/22 [facility]5          Cognition    Orientation Status (Cognition) oriented x 4  -ALLIE     Row Name 11/05/22 [facility]5          Safety Issues, Functional Mobility    Safety Issues Affecting Function (Mobility) insight into deficits/self-awareness;safety precaution awareness  -     Impairments Affecting Function (Mobility) endurance/activity tolerance;balance  -           User Key  (r) = Recorded By, (t) = Taken By, (c) = Cosigned By    Initials Name Provider Type    Sujata Ordonez, PT Physical Therapist               Mobility     Row Name 11/05/22 [facility]5          Bed Mobility    Comment, (Bed Mobility) pt in chair before and after session  -ALLIE     Row Name 11/05/22 [facility]8          Sit-Stand Transfer    Sit-Stand [place] (Transfers) contact guard  -ALLIE     Row Name 11/05/22 [facility]          Gait/Stairs (Locomotion)    [place] Level (Gait) contact guard  -ALLIE     Distance in Feet (Gait) 200ft  -           User Key  (r) = Recorded By, (t) = Taken By, (c) = Cosigned By    Initials Name Provider Type    Sujata Ordonez, PT Physical Therapist                Obj/Interventions     [facility] Name 11/05/22 1355          Range of Motion Comprehensive    Comment, General Range of Motion AROM WFL all extremities  -[facility] Name 11/05/22 1355          Strength Comprehensive (MMT)    Comment, General Manual Muscle Testing (MMT) Assessment BUE: grossly 4/5 BLE: grossly 4/5  -     Row Name 11/05/22 1355          Balance    Balance Assessment sitting static balance;sitting dynamic balance;sit to stand dynamic balance;standing static balance;standing dynamic balance  -     Static Sitting Balance independent  -     Dynamic Sitting Balance independent  -     Position, Sitting Balance sitting in chair  -     Sit to Stand Dynamic Balance contact guard  -     Static Standing Balance standby assist  -     Dynamic Standing Balance contact guard  -[facility] Name 11/05/22 1355          Sensory Assessment (Somatosensory)    Sensory Assessment (Somatosensory) sensation intact  -           User Key  (r) = Recorded By, (t) = Taken By, (c) = Cosigned By    Initials Name Provider Type    Sujata Ordonez, PT Physical Therapist               Goals/Plan     [facility] Name 11/05/22 [facility]5          Bed Mobility Goal 1 (PT)    Activity/Assistive Device (Bed Mobility Goal 1, PT) sit to supine;supine to sit  -     [place] Level/Cues Needed (Bed Mobility Goal 1, PT) independent  -     Time Frame (Bed Mobility Goal 1, PT) by discharge  -     Progress/Outcomes (Bed Mobility Goal 1, PT) goal not met  -[facility] Name 11/05/22 [facility]5          Transfer Goal 1 (PT)    Activity/Assistive Device (Transfer Goal 1, PT) sit-to-stand/stand-to-sit;bed-to-chair/chair-to-bed  -     [place] Level/Cues Needed (Transfer Goal 1, PT) standby assist;supervision required  -     Time Frame (Transfer Goal 1, PT) by discharge  -     Progress/Outcome (Transfer Goal 1, PT) goal not met  -[facility] Name 11/05/22 1355          Gait Training Goal 1 (PT)    Activity/Assistive Device (Gait Training Goal 1,  PT) gait (walking locomotion);decrease fall risk;increase endurance/gait distance  -     [place] Level (Gait Training Goal 1, PT) contact guard required;standby assist  -     Distance (Gait Training Goal 1, PT) 300ft or more x2  -ALLIE     Time Frame (Gait Training Goal 1, PT) by discharge  -ALLIE     Progress/Outcome (Gait Training Goal 1, PT) goal not met  -     Row Name 11/05/22 1350          Stairs Goal 1 (PT)    Activity/Assistive Device (Stairs Goal 1, PT) ascending stairs;descending stairs;using handrail, right  -     [place] Level/Cues Needed (Stairs Goal 1, PT) standby assist;modified independence  -ALLIE     Time Frame (Stairs Goal 1, PT) by discharge  -ALLIE     Progress/Outcome (Stairs Goal 1, PT) goal not met  -     Row Name 11/05/22 1357          Therapy Assessment/Plan (PT)    Planned Therapy Interventions (PT) balance training;bed mobility training;gait training;home exercise program;patient/family education;stair training;strengthening;transfer training  -           User Key  (r) = Recorded By, (t) = Taken By, (c) = Cosigned By    Initials Name Provider Type    ALLIE Sujata Man, PT Physical Therapist               Clinical Impression     Row Name 11/05/22 1655          Pain    Pretreatment Pain Rating 0/10 - no pain  -     Posttreatment Pain Rating 0/10 - no pain  -     Additional Documentation Pain Scale: Numbers Pre/Post-Treatment (Group)  -[facility] Name 11/05/22 3495          Plan of Care Review    Plan of Care Reviewed With patient;spouse  -     Outcome Evaluation PT evaluation completed. Pt in recliner;wife present and supportive. Both verify RN's report pt has recently walked with nursing around the unit. Pt agreeable to therapy. Pt transferred sit to stand with CGA and ambulated without device 200ft with CGA. Pt veered at times from side to side(likely more due to visual deficit than decreased balance but some unsteadiness. Function limited by decreased strength, balance,  and tolerance for functional mobility and activities. Pt will benefit from PT to regain lost function. Anticipate home with assistance at discharge  -     Row Name 11/05/22 1355          Therapy Assessment/Plan (PT)    Patient/Family Therapy Goals Statement (PT) return to PLOF  -     Rehab Potential (PT) good, to achieve stated therapy goals  -     Criteria for Skilled Interventions Met (PT) yes;meets criteria;skilled treatment is necessary  -     Therapy Frequency (PT) 2 times/day  -     Predicted Duration of Therapy Intervention (PT) until discharge or goals met  -     Row Name 11/05/22 1355          Vital Signs    Pre Systolic BP Rehab 136  -ALLIE     Pre Treatment Diastolic BP 64  -ALLIE     Post Systolic BP Rehab 140  -ALLIE     Post Treatment Diastolic BP 66  -ALLIE     Pretreatment Heart Rate (beats/min) 69  -ALLIE     Posttreatment Heart Rate (beats/min) 71  -ALLIE     Pre SpO2 (%) 93  -ALLIE     O2 Delivery Pre Treatment room air  -ALLIE     Post SpO2 (%) 94  -ALLIE     O2 Delivery Post Treatment room air  -ALLIE     Pre Patient Position Sitting  -ALLIE     Post Patient Position Sitting  -     Row Name 11/05/22 1351          Positioning and Restraints    Pre-Treatment Position sitting in chair/recliner  -ALLIE     Post Treatment Position chair  -ALLIE     In Chair notified nsg;encouraged to call for assist;with nsg;with family/caregiver  -           User Key  (r) = Recorded By, (t) = Taken By, (c) = Cosigned By    Initials Name Provider Type    Sujata Ordonez, PT Physical Therapist               Outcome Measures     Row Name 11/05/22 1355 11/05/22 0819       How much help from another person do you currently need...    Turning from your back to your side while in flat bed without using bedrails? 4  -ALLIE 4  -KR    Moving from lying on back to sitting on the side of a flat bed without bedrails? 4  -ALLIE 4  -KR    Moving to and from a bed to a chair (including a wheelchair)? 3  -ALLIE 3  -KR    Standing up from a chair using your arms  (e.g., wheelchair, bedside chair)? 3  -ALLIE 3  -KR    Climbing 3-5 steps with a railing? 3  -ALLIE 2  -KR    To walk in hospital room? 3  -ALLIE 2  -KR    AM-PAC 6 Clicks Score (PT) 20  -ALLIE 18  -KR    Highest level of mobility 6 --> Walked 10 steps or more  -ALLIE 6 --> Walked 10 steps or more  -KR    Row Name 11/05/22 1227          Functional Assessment    Outcome Measure Options AM-PAC 6 Clicks Basic Mobility (PT)  -           User Key  (r) = Recorded By, (t) = Taken By, (c) = Cosigned By    Initials Name Provider Type    Sujata Ordonez, PT Physical Therapist    Katie Alvarado RN Registered Nurse                             Physical Therapy Education     Title: PT OT SLP Therapies (In Progress)     Topic: Physical Therapy (In Progress)     Point: Mobility training (In Progress)     Learning Progress Summary           Patient Acceptance, E, NR by  at 11/5/2022 1757                   Point: Home exercise program (Not Started)     Learner Progress:  Not documented in this visit.          Point: Body mechanics (In Progress)     Learning Progress Summary           Patient Acceptance, E, NR by  at 11/5/2022 1757                   Point: Precautions (In Progress)     Learning Progress Summary           Patient Acceptance, E, NR by  at 11/5/2022 1757                               User Key     Initials Effective Dates Name Provider Type [facility] 06/16/21 -  Sujata Man, PT Physical Therapist PT              PT Recommendation and Plan  Planned Therapy Interventions (PT): balance training, bed mobility training, gait training, home exercise program, patient/family education, stair training, strengthening, transfer training  Plan of Care Reviewed With: patient, spouse  Outcome Evaluation: PT evaluation completed. Pt in recliner;wife present and supportive. Both verify RN's report pt has recently walked with nursing around the unit. Pt agreeable to therapy. Pt transferred sit to stand with CGA and  ambulated without device 200ft with CGA. Pt veered at times from side to side(likely more due to visual deficit than decreased balance but some unsteadiness. Function limited by decreased strength, balance, and tolerance for functional mobility and activities. Pt will benefit from PT to regain lost function. Anticipate home with assistance at discharge     Time Calculation:    PT Charges     Row Name 11/05/22 1757             Time Calculation    Start Time 1355  -ALLIE      Stop Time 1433  -ALLIE      Time Calculation (min) 38 min  -ALLIE      PT Received On 11/05/22  -      PT Goal Re-Cert Due Date 11/18/22  -         Time Calculation- PT    Total Timed Code Minutes- PT 0 minute(s)  -ALLIE         Untimed Charges    PT Eval/Re-eval Minutes 38  -ALLIE         Total Minutes    Untimed Charges Total Minutes 38  -ALLIE       Total Minutes 38  -ALLIE            User Key  (r) = Recorded By, (t) = Taken By, (c) = Cosigned By    Initials Name Provider Type    ALLIE Sujata Man, PT Physical Therapist              Therapy Charges for Today     Code Description Service Date Service Provider Modifiers Qty    [id number]  PT EVAL MOD COMPLEXITY 3 11/5/2022 Sujata Man, PT GP 1          PT G-Codes  Outcome Measure Options: AM-PAC 6 Clicks Basic Mobility (PT)  AM-PAC 6 Clicks Score (PT): 20    Sujata Man PT  11/5/2022

## 2022-11-06 ENCOUNTER — APPOINTMENT (OUTPATIENT)
Dept: GENERAL RADIOLOGY | Facility: HOSPITAL | Age: 72
End: 2022-11-06

## 2022-11-06 LAB
ANION GAP SERPL CALCULATED.3IONS-SCNC: 8 MMOL/L (ref 5–15)
ARTERIAL PATENCY WRIST A: ABNORMAL
ATMOSPHERIC PRESS: 749 MMHG
BASE EXCESS BLDA CALC-SCNC: 3.4 MMOL/L (ref 0–2)
BASOPHILS # BLD AUTO: 0.03 10*3/MM3 (ref 0–0.2)
BASOPHILS NFR BLD AUTO: 0.3 % (ref 0–1.5)
BDY SITE: ABNORMAL
BUN SERPL-MCNC: 15 MG/DL (ref 8–23)
BUN/CREAT SERPL: 11.8 (ref 7–25)
CALCIUM SPEC-SCNC: 9 MG/DL (ref 8.6–10.5)
CHLORIDE SERPL-SCNC: 100 MMOL/L (ref 98–107)
CO2 SERPL-SCNC: 27 MMOL/L (ref 22–29)
CREAT SERPL-MCNC: 1.27 MG/DL (ref 0.76–1.27)
DEPRECATED RDW RBC AUTO: 42.3 FL (ref 37–54)
EGFRCR SERPLBLD CKD-EPI 2021: 59.3 ML/MIN/1.73
EOSINOPHIL # BLD AUTO: 0.1 10*3/MM3 (ref 0–0.4)
EOSINOPHIL NFR BLD AUTO: 1 % (ref 0.3–6.2)
ERYTHROCYTE [DISTWIDTH] IN BLOOD BY AUTOMATED COUNT: 13.5 % (ref 12.3–15.4)
GLUCOSE SERPL-MCNC: 124 MG/DL (ref 65–99)
HCO3 BLDA-SCNC: 26.6 MMOL/L (ref 20–26)
HCT VFR BLD AUTO: 38.6 % (ref 37.5–51)
HGB BLD-MCNC: 12.8 G/DL (ref 13–17.7)
IMM GRANULOCYTES # BLD AUTO: 0.04 10*3/MM3 (ref 0–0.05)
IMM GRANULOCYTES NFR BLD AUTO: 0.4 % (ref 0–0.5)
INHALED O2 CONCENTRATION: <21 %
LYMPHOCYTES # BLD AUTO: 0.81 10*3/MM3 (ref 0.7–3.1)
LYMPHOCYTES NFR BLD AUTO: 8.2 % (ref 19.6–45.3)
Lab: ABNORMAL
MAGNESIUM SERPL-MCNC: 1.6 MG/DL (ref 1.6–2.4)
MCH RBC QN AUTO: 28.8 PG (ref 26.6–33)
MCHC RBC AUTO-ENTMCNC: 33.2 G/DL (ref 31.5–35.7)
MCV RBC AUTO: 86.9 FL (ref 79–97)
MODALITY: ABNORMAL
MONOCYTES # BLD AUTO: 1.08 10*3/MM3 (ref 0.1–0.9)
MONOCYTES NFR BLD AUTO: 10.9 % (ref 5–12)
NEUTROPHILS NFR BLD AUTO: 7.81 10*3/MM3 (ref 1.7–7)
NEUTROPHILS NFR BLD AUTO: 79.2 % (ref 42.7–76)
NRBC BLD AUTO-RTO: 0 /100 WBC (ref 0–0.2)
PCO2 BLDA: 35.1 MM HG (ref 35–45)
PH BLDA: 7.49 PH UNITS (ref 7.35–7.45)
PHOSPHATE SERPL-MCNC: 1.8 MG/DL (ref 2.5–4.5)
PHOSPHATE SERPL-MCNC: 1.9 MG/DL (ref 2.5–4.5)
PLATELET # BLD AUTO: 173 10*3/MM3 (ref 140–450)
PMV BLD AUTO: 9.9 FL (ref 6–12)
PO2 BLDA: 54.4 MM HG (ref 83–108)
POTASSIUM SERPL-SCNC: 3.8 MMOL/L (ref 3.5–5.2)
RBC # BLD AUTO: 4.44 10*6/MM3 (ref 4.14–5.8)
SAO2 % BLDCOA: 91.4 % (ref 94–99)
SODIUM SERPL-SCNC: 135 MMOL/L (ref 136–145)
VENTILATOR MODE: ABNORMAL
WBC NRBC COR # BLD: 9.87 10*3/MM3 (ref 3.4–10.8)

## 2022-11-06 PROCEDURE — 99024 POSTOP FOLLOW-UP VISIT: CPT | Performed by: SURGERY

## 2022-11-06 PROCEDURE — 36600 WITHDRAWAL OF ARTERIAL BLOOD: CPT

## 2022-11-06 PROCEDURE — 93010 ELECTROCARDIOGRAM REPORT: CPT | Performed by: INTERNAL MEDICINE

## 2022-11-06 PROCEDURE — 25010000002 HEPARIN (PORCINE) PER 1000 UNITS: Performed by: STUDENT IN AN ORGANIZED HEALTH CARE EDUCATION/TRAINING PROGRAM

## 2022-11-06 PROCEDURE — 80048 BASIC METABOLIC PNL TOTAL CA: CPT | Performed by: STUDENT IN AN ORGANIZED HEALTH CARE EDUCATION/TRAINING PROGRAM

## 2022-11-06 PROCEDURE — 71045 X-RAY EXAM CHEST 1 VIEW: CPT

## 2022-11-06 PROCEDURE — 25010000002 HALOPERIDOL LACTATE PER 5 MG: Performed by: SURGERY

## 2022-11-06 PROCEDURE — 93005 ELECTROCARDIOGRAM TRACING: CPT | Performed by: SURGERY

## 2022-11-06 PROCEDURE — 85025 COMPLETE CBC W/AUTO DIFF WBC: CPT | Performed by: STUDENT IN AN ORGANIZED HEALTH CARE EDUCATION/TRAINING PROGRAM

## 2022-11-06 PROCEDURE — 82803 BLOOD GASES ANY COMBINATION: CPT

## 2022-11-06 PROCEDURE — 84100 ASSAY OF PHOSPHORUS: CPT | Performed by: SURGERY

## 2022-11-06 PROCEDURE — 0 MAGNESIUM SULFATE 4 GM/100ML SOLUTION: Performed by: SURGERY

## 2022-11-06 PROCEDURE — 83735 ASSAY OF MAGNESIUM: CPT | Performed by: STUDENT IN AN ORGANIZED HEALTH CARE EDUCATION/TRAINING PROGRAM

## 2022-11-06 PROCEDURE — 97116 GAIT TRAINING THERAPY: CPT

## 2022-11-06 PROCEDURE — 84100 ASSAY OF PHOSPHORUS: CPT | Performed by: STUDENT IN AN ORGANIZED HEALTH CARE EDUCATION/TRAINING PROGRAM

## 2022-11-06 RX ORDER — HALOPERIDOL 5 MG/ML
0.5 INJECTION INTRAMUSCULAR EVERY 6 HOURS PRN
Status: DISCONTINUED | OUTPATIENT
Start: 2022-11-06 | End: 2022-11-08 | Stop reason: HOSPADM

## 2022-11-06 RX ORDER — MAGNESIUM SULFATE HEPTAHYDRATE 40 MG/ML
2 INJECTION, SOLUTION INTRAVENOUS AS NEEDED
Status: DISCONTINUED | OUTPATIENT
Start: 2022-11-06 | End: 2022-11-08 | Stop reason: HOSPADM

## 2022-11-06 RX ORDER — MAGNESIUM SULFATE HEPTAHYDRATE 40 MG/ML
4 INJECTION, SOLUTION INTRAVENOUS AS NEEDED
Status: DISCONTINUED | OUTPATIENT
Start: 2022-11-06 | End: 2022-11-08 | Stop reason: HOSPADM

## 2022-11-06 RX ORDER — POTASSIUM CHLORIDE 750 MG/1
40 CAPSULE, EXTENDED RELEASE ORAL AS NEEDED
Status: DISCONTINUED | OUTPATIENT
Start: 2022-11-06 | End: 2022-11-08 | Stop reason: HOSPADM

## 2022-11-06 RX ORDER — POTASSIUM CHLORIDE 1.5 G/1.77G
40 POWDER, FOR SOLUTION ORAL AS NEEDED
Status: DISCONTINUED | OUTPATIENT
Start: 2022-11-06 | End: 2022-11-08 | Stop reason: HOSPADM

## 2022-11-06 RX ORDER — LANOLIN ALCOHOL/MO/W.PET/CERES
3 CREAM (GRAM) TOPICAL NIGHTLY PRN
Status: DISCONTINUED | OUTPATIENT
Start: 2022-11-06 | End: 2022-11-08 | Stop reason: HOSPADM

## 2022-11-06 RX ADMIN — HALOPERIDOL LACTATE 0.5 MG: 5 INJECTION, SOLUTION INTRAMUSCULAR at 18:27

## 2022-11-06 RX ADMIN — HEPARIN SODIUM 5000 UNITS: 5000 INJECTION INTRAVENOUS; SUBCUTANEOUS at 08:02

## 2022-11-06 RX ADMIN — METOPROLOL SUCCINATE 25 MG: 25 TABLET, FILM COATED, EXTENDED RELEASE ORAL at 21:22

## 2022-11-06 RX ADMIN — MAGNESIUM SULFATE 4 G: 4 INJECTION INTRAVENOUS at 09:00

## 2022-11-06 RX ADMIN — FAMOTIDINE 20 MG: 10 INJECTION, SOLUTION INTRAVENOUS at 08:02

## 2022-11-06 RX ADMIN — HEPARIN SODIUM 5000 UNITS: 5000 INJECTION INTRAVENOUS; SUBCUTANEOUS at 21:20

## 2022-11-06 RX ADMIN — POTASSIUM & SODIUM PHOSPHATES POWDER PACK 280-160-250 MG 2 PACKET: 280-160-250 PACK at 18:04

## 2022-11-06 RX ADMIN — ALVIMOPAN 12 MG: 12 CAPSULE ORAL at 10:00

## 2022-11-06 RX ADMIN — FAMOTIDINE 20 MG: 10 INJECTION, SOLUTION INTRAVENOUS at 21:21

## 2022-11-06 RX ADMIN — ACETAMINOPHEN 1000 MG: 500 TABLET ORAL at 05:58

## 2022-11-06 RX ADMIN — Medication 10 ML: at 08:02

## 2022-11-06 RX ADMIN — Medication 10 ML: at 21:23

## 2022-11-06 RX ADMIN — POTASSIUM & SODIUM PHOSPHATES POWDER PACK 280-160-250 MG 2 PACKET: 280-160-250 PACK at 09:00

## 2022-11-06 RX ADMIN — MELATONIN 3 MG: 3 TAB ORAL at 21:22

## 2022-11-06 NOTE — THERAPY TREATMENT NOTE
"Acute Care - Physical Therapy Treatment Note  [facility]     Patient Name: Henrik Sands  : 1950  MRN: [id number]  Today's Date: 2022      Visit Dx:     ICD-10-CM ICD-9-CM   1. History of colon polyps  Z86.010 V12.72   2. Impaired functional mobility, balance, gait, and endurance  Z74.09 V49.89     Patient Active Problem List   Diagnosis   • Bruit of left carotid artery   • General medical exam   • Screening for prostate cancer   • Essential hypertension   • Mixed hyperlipidemia   • Malaise   • Dyspnea on exertion   • Chest pain   • Palpitation   • Vasovagal syncope   • Angina pectoris (HCC)   • Blind in both eyes   • Need for vaccination   • Cellulitis and abscess of right leg   • Inguinal pain   • Diarrhea   • History of colon cancer   • History of colon polyps   • Incontinence of feces with fecal urgency   • Appendiceal tumor     Past Medical History:   Diagnosis Date   • Abdominal bloating    • After-cataract with vision obscured     left   • Allergic rhinitis    • Arthritis     RA   • Artificial lens present     both   • Asthma     \"mild\"   • Colon cancer (HCC)    • Colonic polyp     Polyp of large intestine - multiple      • Cranial nerve disorder, unspecified      right 3rd x 10days to 2wks      • Diabetes mellitus (HCC)    • Disorder of skin     lesion to right forehead      • Essential hypertension     which may be renovascular in origin      • MEL (generalized anxiety disorder)    • Generalized colicky abdominal pain    • GERD (gastroesophageal reflux disease)    • History of colonic polyps    • History of echocardiogram 2008    normal systolic function,minimal eft atrial enlargement,aortic root upper limits of normal   • History of malignant neoplasm of colon    • Hyperlipidemia    • Hypokalemia     persistent   • Ischemic optic neuropathy of both eyes    • Kidney stone     with hyperoxaluria now for 1st time      • Long term use of drug    • Malaise and fatigue    • " Neoplasm of uncertain behavior of skin    • 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   • Osteoarthritis    • Pain in lower limb    • Primary malignant neoplasm of colon (HCC)    • Pseudophakia    • Renal impairment     stage 1   • Skin cancer     basal cell   • Stasis edema    • Type 2 diabetes mellitus (HCC)     no BDR   • Vitamin D deficiency     on treatment     Past Surgical History:   Procedure Laterality Date   • CARDIAC CATHETERIZATION  04/25/2008    selective coronary angiography,right iliofemoral,mild nonobstructive CAD,potential for dilated ascending aorta given the necessity of using a JL5   • CARDIAC CATHETERIZATION N/A 7/19/2018    Procedure: Left Heart Cath/ PCI if indicated;  Surgeon: Erik So MD;  Location: [facility] CATH INVASIVE LOCATION;  Service: Cardiovascular   • CATARACT EXTRACTION  06/18/2014    AFTER CATARACT LASER SURGERY 22181 (1)      • CATARACT EXTRACTION W/ INTRAOCULAR LENS IMPLANT Left 12/19/2006   • COLECTOMY PARTIAL / TOTAL  01/31/2007    low anterior resection of the colon with stapled colorectal anastomosis.Middle rectal cancer w/suspected sigmoid serosal implants at the rectosigmoid junction   • COLONOSCOPY N/A 9/21/2022    Procedure: COLONOSCOPY--bx;  Surgeon: Nehemiah Ramirez MD;  Location: [facility] ENDOSCOPY;  Service: Gastroenterology;  Laterality: N/A;   • COLONOSCOPY W/ POLYPECTOMY  02/11/2016    Patent end-to-end colo-colonic anastomosis.Melanosis in the colon.One 2 mm polyp at 85 cm prox.to anus.Resected and retrieved.One 5 mm polyp at 80 cm prox to anus.Resected and retrieved.One 1 mm polyp at 40 cm prox. to anus.Resected and retrieved.   • EXCISION LESION  11/06/2001    Electrodesiccation and curettage x 3, right upper back   • EXTRACORPOREAL SHOCK WAVE LITHOTRIPSY (ESWL)  08/24/2005    right extracorporeal shock wave 1000 shocks at an energy level 9.Bilateral ureteral calculus,right one symptomatic.Stone 5 x7   • MEDIPORT INSERTION, SINGLE  04/11/2007    left subclavin  Darryl,metastatic colon cancer     PT Assessment (last 12 hours)     PT Evaluation and Treatment     Row Name 22 142          Physical Therapy Time and Intention    Subjective Information no complaints  -     Document Type therapy note (daily note)  -     Mode of Treatment physical therapy;individual therapy  -     Comment simran olivia tx, pt having confusion  -     Row Name 22          General Information    Patient Profile Reviewed yes  -     Existing Precautions/Restrictions fall;other (see comments)  pt is legally blind  -     Row Name 22          Pain    Pretreatment Pain Rating 0/10 - no pain  -     Posttreatment Pain Rating 0/10 - no pain  -     Row Name 22          Cognition    Orientation Status (Cognition) oriented to;person    -     Row Name 22          Bed Mobility    Bed Mobility supine-sit;sit-supine  -     Supine-Sit Ware (Bed Mobility) minimum assist (75% patient effort);verbal cues  -     Sit-Supine [place] (Bed Mobility) minimum assist (75% patient effort);verbal cues  -     Assistive Device (Bed Mobility) bed rails;head of bed elevated  -     Comment, (Bed Mobility) pt required mult vc's 2' blindness and confusion  -     Row Name 22          Transfers    Transfers sit-stand transfer;stand-sit transfer  -     Row Name 22          Sit-Stand Transfer    Sit-Stand Ware (Transfers) contact guard  -     Row Name 22          Stand-Sit Transfer    Stand-Sit Ware (Transfers) contact guard  -     Row Name 22          Gait/Stairs (Locomotion)    Ware Level (Gait) contact guard;2 person assist  HHA x 2 w/ vc's for directions  -     Distance in Feet (Gait) 300'  -     Row Name 22 142          Safety Issues, Functional Mobility    Impairments Affecting Function (Mobility) endurance/activity tolerance;balance  -     Row Name              Wound 11/04/22 1048 abdomen Incision    Wound - Properties Group Placement Date: 11/04/22 -TW Placement Time: 1048 -TW Location: abdomen  -TW Primary Wound Type: Incision  -TW, X 4 PORT SITES     Retired Wound - Properties Group Placement Date: 11/04/22 -TW Placement Time: 1048 -TW Location: abdomen  -TW Primary Wound Type: Incision  -TW, X 4 PORT SITES     Retired Wound - Properties Group Date first assessed: 11/04/22 -TW Time first assessed: 1048 -TW Location: abdomen  -TW Primary Wound Type: Incision  -TW, X 4 PORT SITES     Row Name             Wound 11/04/22 1144 midline abdomen Incision    Wound - Properties Group Placement Date: 11/04/22 -TW Placement Time: 1144 -TW Orientation: midline  -TW Location: abdomen  -TW Primary Wound Type: Incision  -TW    Retired Wound - Properties Group Placement Date: 11/04/22 -TW Placement Time: 1144 -TW Orientation: midline  -TW Location: abdomen  -TW Primary Wound Type: Incision  -TW    Retired Wound - Properties Group Date first assessed: 11/04/22 -TW Time first assessed: 1144 -TW Location: abdomen  -TW Primary Wound Type: Incision  -TW    Row Name 11/06/22 1425          Plan of Care Review    Plan of Care Reviewed With patient  -JW     Outcome Evaluation nsg oks tx, pt w/ increased confusion, pt also blind and [place], pt t/fers sup<>sit w/ Min x 1 w/ mult vc's, t/kaushik sit<>stand w/ CGA and vc's, pt ambulates ~300' w/ CGA/HHA x 2 and vc's for directions in hallway 2' blindness  -JW     Row Name 11/06/22 1425          Vital Signs    Pretreatment Heart Rate (beats/min) 80  -JW     Pre SpO2 (%) 93  -JW     O2 Delivery Pre Treatment room air  -JW     Pre Patient Position Supine  -JW     Intra Patient Position Standing  -JW     Post Patient Position Supine  -JW     Row Name 11/06/22 1425          Positioning and Restraints    Pre-Treatment Position in bed  -JW     Post Treatment Position bed  -JW     In Bed fowlers;call light within reach;encouraged to call for  assist;exit alarm on;with family/caregiver  -     Row Name 11/06/22 1425          Therapy Assessment/Plan (PT)    Rehab Potential (PT) good, to achieve stated therapy goals  -     Criteria for Skilled Interventions Met (PT) yes;meets criteria;skilled treatment is necessary  -     Therapy Frequency (PT) 2 times/day  -     Row Name 11/06/22 1425          Bed Mobility Goal 1 (PT)    Activity/Assistive Device (Bed Mobility Goal 1, PT) sit to supine;supine to sit  -     [place] Level/Cues Needed (Bed Mobility Goal 1, PT) independent  -JW     Time Frame (Bed Mobility Goal 1, PT) by discharge  -     Progress/Outcomes (Bed Mobility Goal 1, PT) goal not met  -     Row Name 11/06/22 1425          Transfer Goal 1 (PT)    Activity/Assistive Device (Transfer Goal 1, PT) sit-to-stand/stand-to-sit;bed-to-chair/chair-to-bed  -     [place] Level/Cues Needed (Transfer Goal 1, PT) standby assist;supervision required  -     Time Frame (Transfer Goal 1, PT) by discharge  -     Progress/Outcome (Transfer Goal 1, PT) goal not met  -     Row Name 11/06/22 1425          Gait Training Goal 1 (PT)    Activity/Assistive Device (Gait Training Goal 1, PT) gait (walking locomotion);decrease fall risk;increase endurance/gait distance  -     [place] Level (Gait Training Goal 1, PT) contact guard required;standby assist  -     Distance (Gait Training Goal 1, PT) 300ft or more x2  -JW     Time Frame (Gait Training Goal 1, PT) by discharge  -     Progress/Outcome (Gait Training Goal 1, PT) goal not met  -     Row Name 11/06/22 1425          Stairs Goal 1 (PT)    Activity/Assistive Device (Stairs Goal 1, PT) ascending stairs;descending stairs;using handrail, right  -     [place] Level/Cues Needed (Stairs Goal 1, PT) standby assist;modified independence  -JW     Time Frame (Stairs Goal 1, PT) by discharge  -     Progress/Outcome (Stairs Goal 1, PT) goal not met  -           User Key  (r) = Recorded  By, (t) = Taken By, (c) = Cosigned By    Initials Name Provider Type    Jodie Raphael PTA Physical Therapist Assistant    Marisela Lawrence RN Registered Nurse                Physical Therapy Education     Title: PT OT SLP Therapies (In Progress)     Topic: Physical Therapy (In Progress)     Point: Mobility training (In Progress)     Learning Progress Summary           Patient Acceptance, E, NR by  at 11/5/2022 1757                   Point: Home exercise program (Not Started)     Learner Progress:  Not documented in this visit.          Point: Body mechanics (In Progress)     Learning Progress Summary           Patient Acceptance, E, NR by  at 11/5/2022 1757                   Point: Precautions (In Progress)     Learning Progress Summary           Patient Acceptance, E, NR by  at 11/5/2022 1757                               User Key     Initials Effective Dates Name Provider Type [facility] 06/16/21 -  Sujata Man PT Physical Therapist PT              PT Recommendation and Plan  Anticipated Discharge Disposition (PT): home with assist  Therapy Frequency (PT): 2 times/day  Plan of Care Reviewed With: patient  Outcome Evaluation: nsg oks tx, pt w/ increased confusion, pt also blind and [place], pt t/fers sup<>sit w/ Min x 1 w/ mult vc's, t/kaushik sit<>stand w/ CGA and vc's, pt ambulates ~300' w/ CGA/HHA x 2 and vc's for directions in hallway 2' blindness       Time Calculation:    PT Charges     Row Name 11/06/22 1425             Time Calculation    Start Time 1425  -      Stop Time 1443  -      Time Calculation (min) 18 min  -      PT Received On 11/06/22  -         Time Calculation- PT    Total Timed Code Minutes- PT 18 minute(s)  -            User Key  (r) = Recorded By, (t) = Taken By, (c) = Cosigned By    Initials Name Provider Type    Jodie Raphael PTA Physical Therapist Assistant              Therapy Charges for Today     Code Description Service Date Service  Provider Modifiers Qty    [id number]  GAIT TRAINING EA 15 MIN 11/6/2022 Jodie Miles, PTA GP 1          PT G-Codes  Outcome Measure Options: AM-PAC 6 Clicks Basic Mobility (PT)  AM-PAC 6 Clicks Score (PT): 20    Jodie Miles, ANGEL  11/6/2022

## 2022-11-06 NOTE — PROGRESS NOTES
GENERAL SURGERY PROGRESS NOTE     LOS: 2 days       Interval History:     Patient mildly confused overnight.  He is tolerating a diet and passing flatus.  He was started on supplemental oxygen this morning due to a low O2 sat    Medication Review:   acetaminophen, 1,000 mg, Oral, Q6H  alvimopan, 12 mg, Oral, BID  famotidine, 20 mg, Intravenous, BID  heparin (porcine), 5,000 Units, Subcutaneous, Q12H  metoprolol succinate XL, 25 mg, Oral, Nightly  sodium chloride, 10 mL, Intravenous, Q12H        Scopolamine, 1 patch    Objective     Vital Signs:  Temp:  [96.5 °F (35.8 °C)-98.8 °F (37.1 °C)] 97.2 °F (36.2 °C)  Heart Rate:  [66-79] 79  Resp:  [18-21] 21  BP: (136-172)/(61-72) 172/70    Intake/Output Summary (Last 24 hours) at 11/6/2022 0840  Last data filed at 11/6/2022 0450  Gross per 24 hour   Intake 660 ml   Output 1655 ml   Net -995 ml       Physical Exam  Constitutional:       Appearance: Normal appearance.   HENT:      Head: Normocephalic and atraumatic.   Pulmonary:      Effort: Pulmonary effort is normal. No respiratory distress.      Comments: Pulling 1500 on I-S  Abdominal:      General: There is no distension.      Palpations: Abdomen is soft.      Comments: Appropriately tender to palpation, incision is clean/dry/intact   Neurological:      General: No focal deficit present.      Mental Status: He is alert and oriented to person, place, and time.         Results Review:    Results from last 7 days   Lab Units 11/06/22 0533 11/05/22  0640 11/04/22  1605   SODIUM mmol/L 135* 137 137   POTASSIUM mmol/L 3.8 4.1 3.9   CHLORIDE mmol/L 100 102 100   CO2 mmol/L 27.0 26.0 20.0*   BUN mg/dL 15 18 16   CREATININE mg/dL 1.27 1.31* 1.61*   GLUCOSE mg/dL 124* 146* 148*   CALCIUM mg/dL 9.0 9.0 8.9     Results from last 7 days   Lab Units 11/06/22  0533 11/05/22  0640 11/04/22  1605   WBC 10*3/mm3 9.87 13.15* 14.48*   HEMOGLOBIN g/dL 12.8* 13.5 13.6   HEMATOCRIT % 38.6 39.8 39.6   PLATELETS 10*3/mm3 173 196 193     Chest  x-ray demonstrated low volumes    Assessment:    Appendiceal tumor    History of colon polyps      Postop day 2 from lap converted to open colectomy    Plan:    Encourage ambulation and respiratory toilet.  Patient can likely discharge when he is off his supplemental O2        This document has been electronically signed by Jorge A Mason MD on November 6, 2022 08:40 CST

## 2022-11-06 NOTE — PLAN OF CARE
Goal Outcome Evaluation:  Plan of Care Reviewed With: patient, spouse        Progress: declining  Outcome Evaluation: VSS. Increaed confusion and shortness of air. Pt is now on 2L O2. Chest xray ordered.

## 2022-11-06 NOTE — PLAN OF CARE
Goal Outcome Evaluation:  Plan of Care Reviewed With: patient           Outcome Evaluation: nsg oks tx, pt w/ increased confusion, pt also blind and [place], pt t/fers sup<>sit w/ Min x 1 w/ mult vc's, t/kaushik sit<>stand w/ CGA and vc's, pt ambulates ~300' w/ CGA/HHA x 2 and vc's for directions in hallway 2' blindness

## 2022-11-06 NOTE — SIGNIFICANT NOTE
11/06/22 0817   OTHER   Discipline physical therapy assistant   Rehab Time/Intention   Session Not Performed other (see comments)  (nsg states pt w/ low phophorus, to be getting some through IV, check back in the PM after it has had time to run)

## 2022-11-06 NOTE — PLAN OF CARE
Problem: Adult Inpatient Plan of Care  Goal: Plan of Care Review  Flowsheets (Taken 11/6/2022 1060)  Progress: no change  Plan of Care Reviewed With: patient  Outcome Evaluation: vss, confusion continued, removed scope patch, up to bathroom to void, passing gas, ambulating in [place], phosphorus and mag replaced   Goal Outcome Evaluation:  Plan of Care Reviewed With: patient        Progress: no change  Outcome Evaluation: vss, confusion continued, removed scope patch, up to bathroom to void, passing gas, ambulating in [place], phosphorus and mag replaced

## 2022-11-07 LAB
ANION GAP SERPL CALCULATED.3IONS-SCNC: 7 MMOL/L (ref 5–15)
BASOPHILS # BLD AUTO: 0.04 10*3/MM3 (ref 0–0.2)
BASOPHILS NFR BLD AUTO: 0.5 % (ref 0–1.5)
BUN SERPL-MCNC: 10 MG/DL (ref 8–23)
BUN/CREAT SERPL: 9.3 (ref 7–25)
CALCIUM SPEC-SCNC: 8.9 MG/DL (ref 8.6–10.5)
CHLORIDE SERPL-SCNC: 99 MMOL/L (ref 98–107)
CO2 SERPL-SCNC: 28 MMOL/L (ref 22–29)
CREAT SERPL-MCNC: 1.08 MG/DL (ref 0.76–1.27)
DEPRECATED RDW RBC AUTO: 42.3 FL (ref 37–54)
EGFRCR SERPLBLD CKD-EPI 2021: 72 ML/MIN/1.73
EOSINOPHIL # BLD AUTO: 0.09 10*3/MM3 (ref 0–0.4)
EOSINOPHIL NFR BLD AUTO: 1.1 % (ref 0.3–6.2)
ERYTHROCYTE [DISTWIDTH] IN BLOOD BY AUTOMATED COUNT: 13.8 % (ref 12.3–15.4)
GLUCOSE SERPL-MCNC: 105 MG/DL (ref 65–99)
HCT VFR BLD AUTO: 34.7 % (ref 37.5–51)
HGB BLD-MCNC: 12.2 G/DL (ref 13–17.7)
IMM GRANULOCYTES # BLD AUTO: 0.04 10*3/MM3 (ref 0–0.05)
IMM GRANULOCYTES NFR BLD AUTO: 0.5 % (ref 0–0.5)
LYMPHOCYTES # BLD AUTO: 0.89 10*3/MM3 (ref 0.7–3.1)
LYMPHOCYTES NFR BLD AUTO: 10.8 % (ref 19.6–45.3)
MAGNESIUM SERPL-MCNC: 1.9 MG/DL (ref 1.6–2.4)
MCH RBC QN AUTO: 30 PG (ref 26.6–33)
MCHC RBC AUTO-ENTMCNC: 35.2 G/DL (ref 31.5–35.7)
MCV RBC AUTO: 85.3 FL (ref 79–97)
MONOCYTES # BLD AUTO: 1.06 10*3/MM3 (ref 0.1–0.9)
MONOCYTES NFR BLD AUTO: 12.8 % (ref 5–12)
NEUTROPHILS NFR BLD AUTO: 6.15 10*3/MM3 (ref 1.7–7)
NEUTROPHILS NFR BLD AUTO: 74.3 % (ref 42.7–76)
NRBC BLD AUTO-RTO: 0 /100 WBC (ref 0–0.2)
PHOSPHATE SERPL-MCNC: 2.7 MG/DL (ref 2.5–4.5)
PLATELET # BLD AUTO: 162 10*3/MM3 (ref 140–450)
PMV BLD AUTO: 9.7 FL (ref 6–12)
POTASSIUM SERPL-SCNC: 4.2 MMOL/L (ref 3.5–5.2)
RBC # BLD AUTO: 4.07 10*6/MM3 (ref 4.14–5.8)
REF LAB TEST METHOD: NORMAL
SODIUM SERPL-SCNC: 134 MMOL/L (ref 136–145)
WBC NRBC COR # BLD: 8.27 10*3/MM3 (ref 3.4–10.8)

## 2022-11-07 PROCEDURE — 85025 COMPLETE CBC W/AUTO DIFF WBC: CPT | Performed by: STUDENT IN AN ORGANIZED HEALTH CARE EDUCATION/TRAINING PROGRAM

## 2022-11-07 PROCEDURE — 83735 ASSAY OF MAGNESIUM: CPT | Performed by: STUDENT IN AN ORGANIZED HEALTH CARE EDUCATION/TRAINING PROGRAM

## 2022-11-07 PROCEDURE — 80048 BASIC METABOLIC PNL TOTAL CA: CPT | Performed by: STUDENT IN AN ORGANIZED HEALTH CARE EDUCATION/TRAINING PROGRAM

## 2022-11-07 PROCEDURE — 97530 THERAPEUTIC ACTIVITIES: CPT

## 2022-11-07 PROCEDURE — 84100 ASSAY OF PHOSPHORUS: CPT | Performed by: SURGERY

## 2022-11-07 PROCEDURE — 25010000002 HEPARIN (PORCINE) PER 1000 UNITS: Performed by: STUDENT IN AN ORGANIZED HEALTH CARE EDUCATION/TRAINING PROGRAM

## 2022-11-07 RX ADMIN — METOPROLOL SUCCINATE 25 MG: 25 TABLET, FILM COATED, EXTENDED RELEASE ORAL at 19:53

## 2022-11-07 RX ADMIN — Medication 10 ML: at 08:44

## 2022-11-07 RX ADMIN — HEPARIN SODIUM 5000 UNITS: 5000 INJECTION INTRAVENOUS; SUBCUTANEOUS at 19:54

## 2022-11-07 RX ADMIN — FAMOTIDINE 20 MG: 10 INJECTION, SOLUTION INTRAVENOUS at 19:54

## 2022-11-07 RX ADMIN — HEPARIN SODIUM 5000 UNITS: 5000 INJECTION INTRAVENOUS; SUBCUTANEOUS at 08:44

## 2022-11-07 RX ADMIN — FAMOTIDINE 20 MG: 10 INJECTION, SOLUTION INTRAVENOUS at 08:44

## 2022-11-07 NOTE — OP NOTE
Operative Note    Henrik Sands  11/4/2022    Pre-op Diagnosis:   History of colon polyps [Z86.010]    Post-op Diagnosis:     Post-Op Diagnosis Codes:     * History of colon polyps [Z86.010]    Procedure/CPT® Codes:      Procedure(s):  LAPAROSCOPIC TO OPEN RIGHT HEMICOLECTOMY AND LYSIS OF ADHESIONS    Surgeon(s):  Sylvester Fowler MD Cammock, Hiley, MD    Anesthesia: General    Staff:   Circulator: Dayanna Manning RN; Marisela Lee RN  Scrub Person: Jase Carvalho; Mariya Roa  Endo Technician: Leonor Mosquera CST  Assistant: Yaquelin Case    Estimated Blood Loss: 350 mL    Specimens:                ID Type Source Tests Collected by Time   A (Not marked as sent) : RIGHT COLON  Tissue Large Intestine, Right / Ascending Colon TISSUE EXAM, [facility] ([facility], [facility], [facility]) Sylvester Fowler MD 11/4/2022 1248         Drains:   Closed/Suction Drain 1 Right RLQ Bulb 19 Fr. (Active)   Site Description Unable to view 11/07/22 0850   Dressing Status Clean;Dry;Intact 11/07/22 0850   Drainage Appearance Bloody 11/07/22 0850   Status To bulb suction 11/07/22 0850   Output (mL) 30 mL 11/07/22 1000       [REMOVED] NG/OG Tube Orogastric 18 Fr Center mouth (Removed)       [REMOVED] Urethral Catheter Silicone 16 Fr. (Removed)   Daily Indications Selected surgeries ( tract, abdomen) 11/05/22 0819   Site Assessment Skin intact;Clean 11/05/22 0819   Collection Container Standard drainage bag 11/05/22 0819   Securement Method Securing device 11/05/22 0819   Catheter care complete Yes 11/04/22 2100   Output (mL) 300 mL 11/05/22 1100       Findings: Retroperitoneal adhesions. Small amount of radiation enteritis, Significant pelvic adhesions    Complications: none    Indication: 72-year-old gentleman with history of tubovillous adenoma of the appendiceal orifice that was unable to be removed endoscopically.  He presents today for right hemicolectomy.  He has a history of low anterior resection for rectal  cancer.    Operative Note:    Patient was taken to the operating room and placed in the supine position.  He was prepped and draped in the usual sterile fashion.  A timeout was performed indicating correct patient, procedure, and positioning.  Began the operation by entering the abdomen in the right upper quadrant via an Optiview trocar in the standard fashion.  This was done without issue.  There were multiple midline adhesions that were mainly from the omentum.  These were taken down with a combination of blunt and sharp dissection.  An additional 3 trochars were placed triangulating in the right colon.  I began the right colon dissection by attempting a medial to lateral approach.  The ileocolic vessel was identified and a plane was attempted to be created posterior to it to dissect the colon mesentery off of the retroperitoneum.  However this was densely adherent with aberrant scar tissue in this plane.  I therefore elected to perform a lateral to medial dissection.  Identified the white line of [place] lateral to the cecum incised it with the harmonic scalpel.  I continued this up distally along the ascending colon to the hepatic flexure.  I then took down the hepatic flexure using the harmonic scalpel.  I then mobilized the transverse colon by dissecting the omentum off of that and entering the lesser sac and worked proximally along the transverse colon to the [place] flexure taking the omentum off.  Once this was done the entire right colon was mobilized fairly well.  I continued by mobilizing the mesentery off of the retroperitoneal structures.  However as before there were significant adhesions in this area making the dissection quite difficult.  I was able to identify the duodenum and mobilize it off of the colonic mesentery.  This mesentery was densely here to the pancreas however.  The distal small bowel was in the pelvis and adhered to the pelvic sidewall likely secondary to his history of pelvic  radiation.  There was some evidence of radiation enteritis to this area of bowel.  I was not able to mobilize the small bowel from the pelvis laparoscopically and so I placed a hand-assisted port at the midline.  Once I was able to get my hand and there to retract the tissues I was then able to mobilize the small bowel.  Last part of mobilization was done of the colon mesentery of the pancreas.  This point I encountered significant amount of bleeding coming from the anterior surface the pancreas within the colonic mesentery.  This was unable to be controlled laparoscopically and so I rapidly converted to an open operation.  The vessel appeared to be a branch of the right colic and therefore was ligated with a 2-0 Prolene suture.  With her mesentery completely mobilized I selected a proximal transection site of the terminal ileum that showed no evidence of radiation enteritis.  I then selected a site at just proximal to the middle colic vessels and transected the transverse colon with a NAN 75 stapler.  I then performed a high ligation of the ileocolic vessels bringing in the lymph node packet with the specimen.  The intervening mesentery was then taking ensuring the duodenum was not involved.  I then identified the right ureter and ensured that it was well away from our dissection plane.  The specimen was passed off to pathology who did a gross examination and found the polyps with good margin from resection edges.  I performed a side to side antiperistaltic stapled anastomosis with a NAN 75 stapler.  I closed the common enterotomy with 2 layered 3-0 PDS.  I then closed the mesenteric defect with a 3-0 Vicryl suture.  The abdomen was thoroughly irrigated and checked for hemostasis and found to be hemostatic.  I elected to leave a 19 [place] Roly drain near the pancreatic transection although I had a low suspicion for pancreatic injury.  The omentum was mobilized and placed over the anastomosis.  The abdomen was then  closed using #1 PDS sutures and the abdomen was sutured closed with a 4-0 Monocryl.  Overall the patient tolerated the procedure well without complication.  He was extubated and escorted to PACU.    Assistant: Yaquelin Case was responsible for performing the following activities: Suction, Irrigation and Suturing and their skilled assistance was necessary for the success of this case.    Sylvester Fowler MD     Date: 11/7/2022  Time: 13:42 CST

## 2022-11-07 NOTE — PLAN OF CARE
Goal Outcome Evaluation:  Plan of Care Reviewed With: patient, spouse        Progress: improving  Outcome Evaluation: Pt resting at this time. No behaviors noted this shift, drain removed. Dressing applied as ordered. no falls noted.

## 2022-11-07 NOTE — THERAPY TREATMENT NOTE
"Acute Care - Physical Therapy Treatment Note  [facility]     Patient Name: Henrik Sands  : 1950  MRN: [id number]  Today's Date: 2022      Visit Dx:     ICD-10-CM ICD-9-CM   1. History of colon polyps  Z86.010 V12.72   2. Impaired functional mobility, balance, gait, and endurance  Z74.09 V49.89     Patient Active Problem List   Diagnosis   • Bruit of left carotid artery   • General medical exam   • Screening for prostate cancer   • Essential hypertension   • Mixed hyperlipidemia   • Malaise   • Dyspnea on exertion   • Chest pain   • Palpitation   • Vasovagal syncope   • Angina pectoris (HCC)   • Blind in both eyes   • Need for vaccination   • Cellulitis and abscess of right leg   • Inguinal pain   • Diarrhea   • History of colon cancer   • History of colon polyps   • Incontinence of feces with fecal urgency   • Appendiceal tumor     Past Medical History:   Diagnosis Date   • Abdominal bloating    • After-cataract with vision obscured     left   • Allergic rhinitis    • Arthritis     RA   • Artificial lens present     both   • Asthma     \"mild\"   • Colon cancer (HCC)    • Colonic polyp     Polyp of large intestine - multiple      • Cranial nerve disorder, unspecified      right 3rd x 10days to 2wks      • Diabetes mellitus (HCC)    • Disorder of skin     lesion to right forehead      • Essential hypertension     which may be renovascular in origin      • MEL (generalized anxiety disorder)    • Generalized colicky abdominal pain    • GERD (gastroesophageal reflux disease)    • History of colonic polyps    • History of echocardiogram 2008    normal systolic function,minimal eft atrial enlargement,aortic root upper limits of normal   • History of malignant neoplasm of colon    • Hyperlipidemia    • Hypokalemia     persistent   • Ischemic optic neuropathy of both eyes    • Kidney stone     with hyperoxaluria now for 1st time      • Long term use of drug    • Malaise and fatigue    • " Neoplasm of uncertain behavior of skin    • 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   • Osteoarthritis    • Pain in lower limb    • Primary malignant neoplasm of colon (HCC)    • Pseudophakia    • Renal impairment     stage 1   • Skin cancer     basal cell   • Stasis edema    • Type 2 diabetes mellitus (HCC)     no BDR   • Vitamin D deficiency     on treatment     Past Surgical History:   Procedure Laterality Date   • CARDIAC CATHETERIZATION  04/25/2008    selective coronary angiography,right iliofemoral,mild nonobstructive CAD,potential for dilated ascending aorta given the necessity of using a JL5   • CARDIAC CATHETERIZATION N/A 7/19/2018    Procedure: Left Heart Cath/ PCI if indicated;  Surgeon: Erik So MD;  Location: [facility] CATH INVASIVE LOCATION;  Service: Cardiovascular   • CATARACT EXTRACTION  06/18/2014    AFTER CATARACT LASER SURGERY 39433 (1)      • CATARACT EXTRACTION W/ INTRAOCULAR LENS IMPLANT Left 12/19/2006   • COLECTOMY PARTIAL / TOTAL  01/31/2007    low anterior resection of the colon with stapled colorectal anastomosis.Middle rectal cancer w/suspected sigmoid serosal implants at the rectosigmoid junction   • COLONOSCOPY N/A 9/21/2022    Procedure: COLONOSCOPY--bx;  Surgeon: Nehemiah Ramirez MD;  Location: [facility] ENDOSCOPY;  Service: Gastroenterology;  Laterality: N/A;   • COLONOSCOPY W/ POLYPECTOMY  02/11/2016    Patent end-to-end colo-colonic anastomosis.Melanosis in the colon.One 2 mm polyp at 85 cm prox.to anus.Resected and retrieved.One 5 mm polyp at 80 cm prox to anus.Resected and retrieved.One 1 mm polyp at 40 cm prox. to anus.Resected and retrieved.   • EXCISION LESION  11/06/2001    Electrodesiccation and curettage x 3, right upper back   • EXTRACORPOREAL SHOCK WAVE LITHOTRIPSY (ESWL)  08/24/2005    right extracorporeal shock wave 1000 shocks at an energy level 9.Bilateral ureteral calculus,right one symptomatic.Stone 5 x7   • MEDIPORT INSERTION, SINGLE  04/11/2007    left subclavin  Darryl,metastatic colon cancer     PT Assessment (last 12 hours)     PT Evaluation and Treatment     Row Name 22 0931 22 0850       Physical Therapy Time and Intention    Subjective Information -- no complaints  -RW    Document Type therapy note (daily note)  -RW therapy note (daily note)  -RW    Mode of Treatment physical therapy;individual therapy  -RW physical therapy;individual therapy  -RW    Patient Effort good  -RW good  -RW    Comment -- lots of family in room  -RW    Row Name 22 0931 22 0850       General Information    Patient Profile Reviewed yes  -RW yes  -RW    Existing Precautions/Restrictions fall;other (see comments)  pt is legally blind  -RW fall;other (see comments)  pt is legally blind  -RW    Row Name 22 0931 22 0850       Pain    Pretreatment Pain Rating 0/10 - no pain  -RW 0/10 - no pain  -RW    Posttreatment Pain Rating 0/10 - no pain  -RW 0/10 - no pain  -RW    Row Name 22 0931 22 0850       Cognition    Orientation Status (Cognition) oriented to;person    -RW oriented to;person    -RW    Row Name 22 0931 22 0850       Bed Mobility    Bed Mobility supine-sit;sit-supine  -RW supine-sit;sit-supine  -RW    Supine-Sit [place] (Bed Mobility) standby assist  -RW --    Assistive Device (Bed Mobility) bed rails;head of bed elevated  -RW bed rails;head of bed elevated  -RW    Comment, (Bed Mobility) -- sitting to eob  -RW    Row Name 2231 22 0850       Transfers    Transfers sit-stand transfer;stand-sit transfer  -RW sit-stand transfer;stand-sit transfer  -RW    Row Name 22 0931 22 0850       Sit-Stand Transfer    Sit-Stand [place] (Transfers) contact guard  -RW contact guard  -RW    Assistive Device (Sit-Stand Transfers) --  hha  -RW --  hha  -RW    Row Name 2231 22 0850       Stand-Sit Transfer    Stand-Sit [place] (Transfers) contact guard  -RW contact guard  -RW    Row Name  11/07/22 0850          Toilet Transfer    [place] Level (Toilet Transfer) contact guard;minimum assist (75% patient effort);1 person assist  -RW     Assistive Device (Toilet Transfer) commode;grab bars/safety frame  -RW     Comment, (Toilet Transfer) wife at pt side  -RW     Row Name 11/07/22 0931 11/07/22 0850       Gait/Stairs (Locomotion)    [place] Level (Gait) contact guard  HHA vc's for directions  -RW --  HHA x 2 w/ vc's for directions  -RW    Distance in Feet (Gait) 340  -RW --    Pattern (Gait) step-through  -RW --    Row Name 11/07/22 0931 11/07/22 0850       Safety Issues, Functional Mobility    Impairments Affecting Function (Mobility) endurance/activity tolerance;balance  -RW endurance/activity tolerance;balance  -RW    Row Name             Wound 11/04/22 1048 abdomen Incision    Wound - Properties Group Placement Date: 11/04/22 -TW Placement Time: 1048  -TW Location: abdomen  -TW Primary Wound Type: Incision  -TW, X 4 PORT SITES     Retired Wound - Properties Group Placement Date: 11/04/22 -TW Placement Time: 1048  -TW Location: abdomen  -TW Primary Wound Type: Incision  -TW, X 4 PORT SITES     Retired Wound - Properties Group Date first assessed: 11/04/22 -TW Time first assessed: 1048 -TW Location: abdomen  -TW Primary Wound Type: Incision  -TW, X 4 PORT SITES     Row Name             Wound 11/04/22 1144 midline abdomen Incision    Wound - Properties Group Placement Date: 11/04/22 -TW Placement Time: 1144 -TW Orientation: midline  -TW Location: abdomen  -TW Primary Wound Type: Incision  -TW    Retired Wound - Properties Group Placement Date: 11/04/22 -TW Placement Time: 1144  -TW Orientation: midline  -TW Location: abdomen  -TW Primary Wound Type: Incision  -TW    Retired Wound - Properties Group Date first assessed: 11/04/22 -TW Time first assessed: 1144 -TW Location: abdomen  -TW Primary Wound Type: Incision  -TW    Row Name 11/07/22 0931          Coping    Observed Emotional  State calm;cooperative  -RW     Verbalized Emotional State acceptance  -RW     Family/Support Persons spouse;son  -RW     Involvement in Care at bedside;participating in care;supportive of patient  -RW     Row Name 11/07/22 0931 11/07/22 0850       Vital Signs    Pre Systolic BP Rehab -- 182  -RW    Pre Treatment Diastolic BP -- 81  -RW    Pretreatment Heart Rate (beats/min) 97  -RW 90  -RW    Pre SpO2 (%) 100  -RW 94  -RW    O2 Delivery Pre Treatment room air  -RW room air  -RW    Row Name 11/07/22 0931 11/07/22 0850       Positioning and Restraints    Pre-Treatment Position in bed  -RW in bed  -RW    Post Treatment Position bed  -RW bathroom  -RW    In Bed sitting EOB;with family/caregiver  -RW --    Bathroom -- with family/caregiver  -RW    Row Name 11/07/22 0931 11/07/22 0850       Therapy Assessment/Plan (PT)    Rehab Potential (PT) good, to achieve stated therapy goals  -RW good, to achieve stated therapy goals  -RW    Criteria for Skilled Interventions Met (PT) yes;meets criteria;skilled treatment is necessary  -RW yes;meets criteria;skilled treatment is necessary  -RW    Therapy Frequency (PT) 2 times/day  -RW 2 times/day  -RW    Row Name 11/07/22 0931          Progress Summary (PT)    Progress Toward Functional Goals (PT) progress toward functional goals is good  -RW     Row Name 11/07/22 0931 11/07/22 0850       Bed Mobility Goal 1 (PT)    Activity/Assistive Device (Bed Mobility Goal 1, PT) sit to supine;supine to sit  -RW sit to supine;supine to sit  -RW    [place] Level/Cues Needed (Bed Mobility Goal 1, PT) independent  -RW independent  -RW    Time Frame (Bed Mobility Goal 1, PT) by discharge  -RW by discharge  -RW    Progress/Outcomes (Bed Mobility Goal 1, PT) goal not met  -RW goal not met  -RW    Row Name 11/07/22 0931 11/07/22 0850       Transfer Goal 1 (PT)    Activity/Assistive Device (Transfer Goal 1, PT) sit-to-stand/stand-to-sit;bed-to-chair/chair-to-bed  -RW  sit-to-stand/stand-to-sit;bed-to-chair/chair-to-bed  -RW    Person Level/Cues Needed (Transfer Goal 1, PT) standby assist;supervision required  -RW standby assist;supervision required  -RW    Time Frame (Transfer Goal 1, PT) by discharge  -RW by discharge  -RW    Progress/Outcome (Transfer Goal 1, PT) goal not met  -RW goal not met  -RW    Row Name 11/07/22 0931 11/07/22 0850       Gait Training Goal 1 (PT)    Activity/Assistive Device (Gait Training Goal 1, PT) gait (walking locomotion);decrease fall risk;increase endurance/gait distance  -RW gait (walking locomotion);decrease fall risk;increase endurance/gait distance  -RW    Person Level (Gait Training Goal 1, PT) contact guard required;standby assist  -RW contact guard required;standby assist  -RW    Distance (Gait Training Goal 1, PT) 300ft or more x2  -RW 300ft or more x2  -RW    Time Frame (Gait Training Goal 1, PT) by discharge  -RW by discharge  -RW    Progress/Outcome (Gait Training Goal 1, PT) goal not met  -RW goal not met  -RW    Row Name 11/07/22 0931 11/07/22 0850       Stairs Goal 1 (PT)    Activity/Assistive Device (Stairs Goal 1, PT) ascending stairs;descending stairs;using handrail, right  -RW ascending stairs;descending stairs;using handrail, right  -RW    Person Level/Cues Needed (Stairs Goal 1, PT) standby assist;modified independence  -RW standby assist;modified independence  -RW    Time Frame (Stairs Goal 1, PT) by discharge  -RW by discharge  -RW    Progress/Outcome (Stairs Goal 1, PT) goal not met  -RW goal not met  -RW          User Key  (r) = Recorded By, (t) = Taken By, (c) = Cosigned By    Initials Name Provider Type    Erasto Jones PTA Physical Therapist Assistant    Marisela Lawrence, RN Registered Nurse                Physical Therapy Education     Title: PT OT SLP Therapies (In Progress)     Topic: Physical Therapy (In Progress)     Point: Mobility training (In Progress)     Learning Progress Summary            Patient Acceptance, E, NR by ALLIE at 11/5/2022 1757                   Point: Home exercise program (Not Started)     Learner Progress:  Not documented in this visit.          Point: Body mechanics (In Progress)     Learning Progress Summary           Patient Acceptance, E, NR by ALLIE at 11/5/2022 1757                   Point: Precautions (In Progress)     Learning Progress Summary           Patient Acceptance, E, NR by ALLIE at 11/5/2022 1757                               User Key     Initials Effective Dates Name Provider Type Discipline     06/16/21 -  Sujata Man, PT Physical Therapist PT              PT Recommendation and Plan  Anticipated Discharge Disposition (PT): home with assist  Therapy Frequency (PT): 2 times/day  Progress Summary (PT)  Progress Toward Functional Goals (PT): progress toward functional goals is good  Plan of Care Reviewed With: patient, spouse, family  Progress: improving  Outcome Evaluation: pt seated eob with  family in room, agrees to amb but once standing decides he needs to go to bathroom. cga into bathroom and to toilet with wife at bedside. came back later and pt in bed and transfers with sba/mod ind to eob. stands with cga and hha amb 340 around unit and to window. back to room and pt wants to sit eob with 3 family menbers in room.needs close supervision.       Time Calculation:    PT Charges     Row Name 11/07/22 1245 11/07/22 1244          Time Calculation    Start Time 0931  -RW 0850  -RW     Stop Time 0945  -RW 0905  -RW     Time Calculation (min) 14 min  -RW 15 min  -RW        Time Calculation- PT    Total Timed Code Minutes- PT 14 minute(s)  -RW 15 minute(s)  -RW        Timed Charges    88718 - PT Therapeutic Activity Minutes 14  -RW 15  -RW        Total Minutes    Timed Charges Total Minutes 14  -RW 15  -RW      Total Minutes 14  -RW 15  -RW           User Key  (r) = Recorded By, (t) = Taken By, (c) = Cosigned By    Initials Name Provider Type    Erasto Jones, PTA  Physical Therapist Assistant              Therapy Charges for Today     Code Description Service Date Service Provider Modifiers Qty    26260657218 HC PT THERAPEUTIC ACT EA 15 MIN 11/7/2022 Erasto Vergara, PTA GP 1    56296309043 HC PT THERAPEUTIC ACT EA 15 MIN 11/7/2022 Erasto Vergara, PTA GP 1          PT G-Codes  Outcome Measure Options: AM-PAC 6 Clicks Basic Mobility (PT)  AM-PAC 6 Clicks Score (PT): 20    Erasto Vergara PTA  11/7/2022

## 2022-11-07 NOTE — SIGNIFICANT NOTE
Pt rolled over rails out of bed onto wife, RNs and CNAs responded to alarm, pt wife had reapplied 4th side rail after RN and CNA had removed, bed alarm on, fall precautions in place, notified MD, tele placed and EKG entered, skin tear on right arm, abrasion on head, checked drain site and incision, safe report filed, post fall score, education for wife and wife signed paper from ER refusing to be seen, moved close to desk for closer monitoring

## 2022-11-07 NOTE — PLAN OF CARE
"  SUBJECTIVE:   CC: Allen Bills is an 52 year old male who presents for preventative health visit.     Healthy Habits:    Do you get at least three servings of calcium containing foods daily (dairy, green leafy vegetables, etc.)? {YES/NO, DAIRY INTAKE:369815::\"yes\"}    Amount of exercise or daily activities, outside of work: {AMOUNT EXERCISE:192011}    Problems taking medications regularly {Yes /No default:928332::\"No\"}    Medication side effects: {Yes /No default.:965576::\"No\"}    Have you had an eye exam in the past two years? {YESNOBLANK:282052}    Do you see a dentist twice per year? {YESNOBLANK:704467}    Do you have sleep apnea, excessive snoring or daytime drowsiness?{YESNOBLANK:974839}    {Outside tests to abstract? :699986}    {additional problems to add (Optional):282937}    Today's PHQ-2 Score:   PHQ-2 ( 1999 [employer]) 3/20/2015 5/31/2013   Q1: Little interest or pleasure in doing things 0 0   Q2: Feeling down, depressed or hopeless 0 0   PHQ-2 Score 0 0     {PHQ-2 LOOK IN ASSESSMENTS :592168}  Abuse: Current or Past(Physical, Sexual or Emotional)- {YES/NO/NA:291234}  Do you feel safe in your environment - {YES/NO/NA:579429}    Social History   Substance Use Topics     Smoking status: Never Smoker     Smokeless tobacco: Never Used     Alcohol use 0.0 oz/week     0 Standard drinks or equivalent per week      Comment: 2 drinks monthly     {ETOH AUDIT:499770}    Last PSA:   PSA   Date Value Ref Range Status   06/06/2016 1.13 0 - 4 ug/L Final       Reviewed orders with patient. Reviewed health maintenance and updated orders accordingly - {Yes/No:556582::\"Yes\"}  {Chronicprobdata (Optional):274114}    Reviewed and updated as needed this visit by clinical staff         Reviewed and updated as needed this visit by Provider        {HISTORY OPTIONS (Optional):131250}    ROS:  {MALE ROS-adult preventive care package:629549::\"C: NEGATIVE for fever, chills, change in weight\",\"I: NEGATIVE for worrisome rashes, " Goal Outcome Evaluation:  Plan of Care Reviewed With: patient, spouse        Progress: improving  Outcome Evaluation: VSS. 2L NC. Bed alarm set. No falls reported this shift. Ambulating with assitance to void. Spouse at bedside.   "moles or lesions\",\"E: NEGATIVE for vision changes or irritation\",\"ENT: NEGATIVE for ear, mouth and throat problems\",\"R: NEGATIVE for significant cough or SOB\",\"CV: NEGATIVE for chest pain, palpitations or peripheral edema\",\"GI: NEGATIVE for nausea, abdominal pain, heartburn, or change in bowel habits\",\" male: negative for dysuria, hematuria, decreased urinary stream, erectile dysfunction, urethral discharge\",\"M: NEGATIVE for significant arthralgias or myalgia\",\"N: NEGATIVE for weakness, dizziness or paresthesias\",\"P: NEGATIVE for changes in mood or affect\"}    OBJECTIVE:   There were no vitals taken for this visit.  EXAM:  {Exam Choices:398641}    ASSESSMENT/PLAN:   {Diag Picklist:099766}    COUNSELING:  {MALE COUNSELING MESSAGES:438163::\"Reviewed preventive health counseling, as reflected in patient instructions\"}    {BP Counseling- Complete if BP >= 120/80  (Optional):271080}     reports that he has never smoked. He has never used smokeless tobacco.  {Tobacco Cessation -- Complete if patient is a smoker (Optional):466227}  Estimated body mass index is 37.18 kg/(m^2) as calculated from the following:    Height as of 6/6/16: 5' 5.5\" (1.664 m).    Weight as of 6/6/16: 226 lb 14.4 oz (102.9 kg).   {Weight Management Plan (ACO) Complete if BMI is abnormal-  Ages 18-64  BMI >24.9.  Age 65+ with BMI <23 or >30 (Optional):537094}    Counseling Resources:  ATP IV Guidelines  Pooled Cohorts Equation Calculator  FRAX Risk Assessment  ICSI Preventive Guidelines  Dietary Guidelines for Americans, 2010  USDA's MyPlate  ASA Prophylaxis  Lung CA Screening    Hossein Farah MD  [facility]  "

## 2022-11-08 ENCOUNTER — READMISSION MANAGEMENT (OUTPATIENT)
Dept: CALL CENTER | Facility: HOSPITAL | Age: 72
End: 2022-11-08

## 2022-11-08 VITALS
BODY MASS INDEX: 30.6 KG/M2 | WEIGHT: 218.6 LBS | DIASTOLIC BLOOD PRESSURE: 80 MMHG | HEART RATE: 115 BPM | TEMPERATURE: 98.5 F | RESPIRATION RATE: 20 BRPM | OXYGEN SATURATION: 94 % | HEIGHT: 71 IN | SYSTOLIC BLOOD PRESSURE: 147 MMHG

## 2022-11-08 LAB
ANION GAP SERPL CALCULATED.3IONS-SCNC: 13 MMOL/L (ref 5–15)
BASOPHILS # BLD AUTO: 0.04 10*3/MM3 (ref 0–0.2)
BASOPHILS NFR BLD AUTO: 0.5 % (ref 0–1.5)
BUN SERPL-MCNC: 12 MG/DL (ref 8–23)
BUN/CREAT SERPL: 11.4 (ref 7–25)
CALCIUM SPEC-SCNC: 9.2 MG/DL (ref 8.6–10.5)
CHLORIDE SERPL-SCNC: 100 MMOL/L (ref 98–107)
CO2 SERPL-SCNC: 23 MMOL/L (ref 22–29)
CREAT SERPL-MCNC: 1.05 MG/DL (ref 0.76–1.27)
DEPRECATED RDW RBC AUTO: 41.5 FL (ref 37–54)
EGFRCR SERPLBLD CKD-EPI 2021: 74.5 ML/MIN/1.73
EOSINOPHIL # BLD AUTO: 0.08 10*3/MM3 (ref 0–0.4)
EOSINOPHIL NFR BLD AUTO: 0.9 % (ref 0.3–6.2)
ERYTHROCYTE [DISTWIDTH] IN BLOOD BY AUTOMATED COUNT: 13.3 % (ref 12.3–15.4)
GLUCOSE SERPL-MCNC: 100 MG/DL (ref 65–99)
HCT VFR BLD AUTO: 41.9 % (ref 37.5–51)
HGB BLD-MCNC: 14.2 G/DL (ref 13–17.7)
IMM GRANULOCYTES # BLD AUTO: 0.03 10*3/MM3 (ref 0–0.05)
IMM GRANULOCYTES NFR BLD AUTO: 0.3 % (ref 0–0.5)
LYMPHOCYTES # BLD AUTO: 1.04 10*3/MM3 (ref 0.7–3.1)
LYMPHOCYTES NFR BLD AUTO: 11.9 % (ref 19.6–45.3)
MAGNESIUM SERPL-MCNC: 1.7 MG/DL (ref 1.6–2.4)
MCH RBC QN AUTO: 29 PG (ref 26.6–33)
MCHC RBC AUTO-ENTMCNC: 33.9 G/DL (ref 31.5–35.7)
MCV RBC AUTO: 85.7 FL (ref 79–97)
MONOCYTES # BLD AUTO: 1.17 10*3/MM3 (ref 0.1–0.9)
MONOCYTES NFR BLD AUTO: 13.4 % (ref 5–12)
NEUTROPHILS NFR BLD AUTO: 6.36 10*3/MM3 (ref 1.7–7)
NEUTROPHILS NFR BLD AUTO: 73 % (ref 42.7–76)
NRBC BLD AUTO-RTO: 0 /100 WBC (ref 0–0.2)
PHOSPHATE SERPL-MCNC: 2.9 MG/DL (ref 2.5–4.5)
PLATELET # BLD AUTO: 213 10*3/MM3 (ref 140–450)
PMV BLD AUTO: 10 FL (ref 6–12)
POTASSIUM SERPL-SCNC: 3.9 MMOL/L (ref 3.5–5.2)
RBC # BLD AUTO: 4.89 10*6/MM3 (ref 4.14–5.8)
SODIUM SERPL-SCNC: 136 MMOL/L (ref 136–145)
WBC NRBC COR # BLD: 8.72 10*3/MM3 (ref 3.4–10.8)

## 2022-11-08 PROCEDURE — 99024 POSTOP FOLLOW-UP VISIT: CPT | Performed by: STUDENT IN AN ORGANIZED HEALTH CARE EDUCATION/TRAINING PROGRAM

## 2022-11-08 PROCEDURE — 80048 BASIC METABOLIC PNL TOTAL CA: CPT | Performed by: STUDENT IN AN ORGANIZED HEALTH CARE EDUCATION/TRAINING PROGRAM

## 2022-11-08 PROCEDURE — 83735 ASSAY OF MAGNESIUM: CPT | Performed by: STUDENT IN AN ORGANIZED HEALTH CARE EDUCATION/TRAINING PROGRAM

## 2022-11-08 PROCEDURE — 85025 COMPLETE CBC W/AUTO DIFF WBC: CPT | Performed by: STUDENT IN AN ORGANIZED HEALTH CARE EDUCATION/TRAINING PROGRAM

## 2022-11-08 PROCEDURE — 84100 ASSAY OF PHOSPHORUS: CPT | Performed by: STUDENT IN AN ORGANIZED HEALTH CARE EDUCATION/TRAINING PROGRAM

## 2022-11-08 RX ORDER — OXYCODONE HYDROCHLORIDE 5 MG/1
5 TABLET ORAL EVERY 6 HOURS PRN
Qty: 16 TABLET | Refills: 0 | Status: SHIPPED | OUTPATIENT
Start: 2022-11-08 | End: 2022-11-08

## 2022-11-08 NOTE — DISCHARGE SUMMARY
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 He did well from a surgery standpoint but did develop some postoperative delirium likely secondary to medications, being hearing and vision impaired. This improved over a day or so. He was able to have his diet advanced over the weekend and was having normal bowel function at time of discharge. He was ambulatory fairly independently at baseline and his family wished to take him home for further recovery. He was discharged in good condition with ambulatory follow up in one week.     Condition at time of Discharge: Good  Discharge Diet: Regular      Discharge Medications:     Your medication list      START taking these medications      Instructions Last Dose Given Next Dose Due   oxyCODONE 5 MG immediate release tablet  Commonly known as: ROXICODONE      Take 1 tablet by mouth Every 6 (Six) Hours As Needed for Moderate Pain for up to 16 doses.          CONTINUE taking these medications      Instructions Last Dose Given Next Dose Due   allopurinol 100 MG tablet  Commonly known as: ZYLOPRIM      Take 1 tablet by mouth 2 (Two) Times a Day.       amLODIPine 10 MG tablet  Commonly known as: NORVASC      Take 1 tablet by mouth Daily.       aspirin 81 MG EC tablet      Take 1 tablet by mouth Every Night.       freestyle lancets      Use to test blood sugar up to 3 times daily **Freestyle**       FREESTYLE LITE test strip  Generic drug: glucose blood      Use to test blood sugar up to 3 times daily **Freestyle Lite**       hydroxychloroquine 200 MG tablet  Commonly known as: PLAQUENIL      Take 1 tablet by mouth 2 (Two) Times a Day.        metoprolol succinate XL 25 MG 24 hr tablet  Commonly known as: TOPROL-XL      Take 1 tablet by mouth Every Night.       omeprazole 40 MG capsule  Commonly known as: priLOSEC      Take 1 capsule by mouth Daily.       potassium citrate 10 MEQ (1080 MG) CR tablet  Commonly known as: UROCIT-K      Take 2 tablets by mouth 4 (Four) Times a Day for hypokalemia.       Unable to find      1 each 1 (One) Time. Med Name:Physician's Choice Probiotic       valsartan-hydrochlorothiazide 320-25 MG per tablet  Commonly known as: DIOVAN-HCT      Take 1 tablet by mouth Daily.       Ventolin  (90 Base) MCG/ACT inhaler  Generic drug: albuterol sulfate HFA      Inhale 2 puffs Every 4 (Four) Hours As Needed for Wheezing.       Vitamin D (Ergocalciferol) 66658 units capsule      Take 1 capsule by mouth Every 7 (Seven) Days.             Where to Get Your Medications      These medications were sent to [facility]  [address] [place], [place] [address]    Hours: Monday through Friday 7:00am to 5:00pm Phone: [phone]   · oxyCODONE 5 MG immediate release tablet         Activity Instructions     Discharge Activity      1) No driving while having significant abdominal pain and no longer taking narcotics.   2) May shower. Do not tub bath or soak in water for two weeks  3) Do not lift / push / pull more than 10 lbs until released by Dr. Fowler            Your Scheduled Appointments    Dec 14, 2022 10:45 AM  Follow Up with Tashi Ribeiro PA-C  [facility] ([address]  [facility] [address] [place] [id number] [phone]   Arrive 15 minutes prior to appointment.                This document has been electronically signed by Sylvester Fowler MD on November 8, 2022 07:58 CST

## 2022-11-08 NOTE — PLAN OF CARE
Goal Outcome Evaluation:  Plan of Care Reviewed With: patient        Progress: improving  Outcome Evaluation: VSS, sleeping well.

## 2022-11-09 ENCOUNTER — TRANSITIONAL CARE MANAGEMENT TELEPHONE ENCOUNTER (OUTPATIENT)
Dept: CALL CENTER | Facility: HOSPITAL | Age: 72
End: 2022-11-09

## 2022-11-09 LAB
QT INTERVAL: 336 MS
QTC INTERVAL: 404 MS

## 2022-11-09 NOTE — OUTREACH NOTE
Call Center TCM Note    Flowsheet Row Responses   [facility] patient discharged from? [place]   Does the patient have one of the following disease processes/diagnoses(primary or secondary)? General Surgery   TCM attempt successful? Yes   Call start time 1505   Call end time 1509   Discharge diagnosis Laparoscopic converted to open right hemicolectomy   Person spoke with today (if not patient) and relationship dilcia Roman   Meds reviewed with patient/caregiver? Yes   Does the patient have all medications related to this admission filled (includes all antibiotics, pain medications, etc.) No   Nursing Interventions No intervention needed   Prescription comments Patient declined narcotics--doing well with OTC pain control   Is the patient taking all medications as directed (includes completed medication regime)? N/A   Comments Hospital PCP FOLLOW UP APPOINTMENT IS 11/16/22@300pm   Does the patient have an appointment with their PCP within 7 days of discharge? Yes   Has home health visited the patient within 72 hours of discharge? N/A   Psychosocial issues? No   Did the patient receive a copy of their discharge instructions? Yes   Nursing interventions Reviewed instructions with patient   What is the patient's perception of their health status since discharge? Improving  [Wife brief--has no questions or concerns, reports this is his second surgery and aware of what to watch for.]   Nursing interventions Nurse provided patient education   Is the patient /caregiver able to teach back basic post-op care? Lifting as instructed by MD in discharge instructions, Keep incision areas clean,dry and protected, Practice 'cough and deep breath', No tub bath, swimming, or hot tub until instructed by MD   Is the patient/caregiver able to teach back signs and symptoms of incisional infection? Increased redness, swelling or pain at the incisonal site, Incisional warmth, Fever, Increased drainage or bleeding, Pus or odor from  incision   Is the patient/caregiver able to teach back steps to recovery at home? Rest and rebuild strength, gradually increase activity   TCM call completed? Yes   Call end time 1858          Ina Watt RN    11/9/2022, 15:10 CST

## 2022-11-09 NOTE — OUTREACH NOTE
Call Center TCM Note    Flowsheet Row Responses   [facility] patient discharged from? [place]   Does the patient have one of the following disease processes/diagnoses(primary or secondary)? General Surgery   TCM attempt successful? No  [verbal release for Sancho Sands, son]   Unsuccessful attempts Attempt 1  [attempted home and cell number]          Ina Watt RN    11/9/2022, 14:03 CST

## 2022-11-09 NOTE — OUTREACH NOTE
Prep Survey    Flowsheet Row Responses   [facility] patient discharged from? [place]   Is LACE score < 7 ? No   Emergency Room discharge w/ pulse ox? No   Eligibility [facility]   Date of Admission 11/04/22   Date of Discharge 11/08/22   Discharge Disposition Home or Self Care   Discharge diagnosis Laparoscopic converted to open right hemicolectomy   Does the patient have one of the following disease processes/diagnoses(primary or secondary)? General Surgery   Does the patient have Home health ordered? No   Is there a DME ordered? No   Prep survey completed? Yes          AVERY BOSWELL - Registered Nurse

## 2022-11-15 ENCOUNTER — OFFICE VISIT (OUTPATIENT)
Dept: SURGERY | Facility: CLINIC | Age: 72
End: 2022-11-15

## 2022-11-15 VITALS
TEMPERATURE: 98.4 F | BODY MASS INDEX: 29.2 KG/M2 | DIASTOLIC BLOOD PRESSURE: 67 MMHG | OXYGEN SATURATION: 100 % | WEIGHT: 208.6 LBS | SYSTOLIC BLOOD PRESSURE: 103 MMHG | HEIGHT: 71 IN

## 2022-11-15 DIAGNOSIS — Z86.010 HISTORY OF COLON POLYPS: Primary | ICD-10-CM

## 2022-11-15 PROCEDURE — 99024 POSTOP FOLLOW-UP VISIT: CPT | Performed by: STUDENT IN AN ORGANIZED HEALTH CARE EDUCATION/TRAINING PROGRAM

## 2022-11-16 ENCOUNTER — OFFICE VISIT (OUTPATIENT)
Dept: FAMILY MEDICINE CLINIC | Facility: CLINIC | Age: 72
End: 2022-11-16

## 2022-11-16 VITALS
WEIGHT: 208 LBS | HEART RATE: 84 BPM | SYSTOLIC BLOOD PRESSURE: 120 MMHG | BODY MASS INDEX: 29.12 KG/M2 | OXYGEN SATURATION: 98 % | DIASTOLIC BLOOD PRESSURE: 60 MMHG | HEIGHT: 71 IN

## 2022-11-16 DIAGNOSIS — R94.31 ABNORMAL EKG: ICD-10-CM

## 2022-11-16 DIAGNOSIS — R00.2 PALPITATIONS: Primary | ICD-10-CM

## 2022-11-16 DIAGNOSIS — L60.2 THICKENED NAILS: ICD-10-CM

## 2022-11-16 DIAGNOSIS — F51.01 PRIMARY INSOMNIA: ICD-10-CM

## 2022-11-16 PROCEDURE — 93010 ELECTROCARDIOGRAM REPORT: CPT | Performed by: INTERNAL MEDICINE

## 2022-11-16 PROCEDURE — 93005 ELECTROCARDIOGRAM TRACING: CPT | Performed by: NURSE PRACTITIONER

## 2022-11-16 PROCEDURE — 99496 TRANSJ CARE MGMT HIGH F2F 7D: CPT | Performed by: NURSE PRACTITIONER

## 2022-11-16 RX ORDER — HYDROXYZINE HYDROCHLORIDE 10 MG/1
TABLET, FILM COATED ORAL
Qty: 60 TABLET | Refills: 11 | Status: SHIPPED | OUTPATIENT
Start: 2022-11-16

## 2022-11-16 NOTE — PROGRESS NOTES
Chief Complaint   Patient presents with   • Hospital Follow Up Visit     Colon resection   • Night Sweats     Can't sleep     Subjective   Henrik Sands is a 72 y.o. male.           History of Present Illness  Presents with recheck of health care needs from hospital discharge -see hospital report  Hospital chart reviewed     Concern for episode of AFIB while in the hospital   Palpitations   This is a recurrent problem. The current episode started 1 to 4 weeks ago. The problem has been waxing and waning. Associated symptoms include anxiety and malaise/fatigue. Pertinent negatives include no chest pain, coughing, diaphoresis, dizziness, nausea, near-syncope, shortness of breath, syncope, vomiting or weakness. The treatment provided mild relief. Risk factors include sedentary lifestyle (recent surgery ). There is no history of anemia, anxiety, drug use, heart disease, hyperthyroidism or a valve disorder.   Insomnia  This is a new problem. The current episode started in the past 7 days. The problem occurs daily. Associated symptoms include arthralgias. Pertinent negatives include no abdominal pain, chest pain, coughing, diaphoresis, nausea, vomiting or weakness. He has tried nothing for the symptoms.        The following portions of the patient's history were reviewed and updated as appropriate: allergies, current medications, past social history and problem list.    Review of Systems   Constitutional: Positive for activity change, appetite change, malaise/fatigue and unexpected weight change. Negative for diaphoresis.   HENT: Positive for hearing loss. Negative for dental problem, drooling, ear discharge, ear pain, facial swelling, mouth sores, nosebleeds, postnasal drip, rhinorrhea, sinus pressure, sinus pain, sneezing, tinnitus, trouble swallowing and voice change.         Hard of hearing    Eyes: Positive for visual disturbance. Negative for pain, discharge, redness and itching.        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  Negative for apnea, cough, choking, chest tightness, shortness of breath, wheezing and stridor.    Cardiovascular: Positive for palpitations. Negative for chest pain, leg swelling, syncope and near-syncope.   Gastrointestinal: Negative.  Negative for abdominal distention, abdominal pain, anal bleeding, blood in stool, constipation, nausea and vomiting.   Endocrine: Positive for cold intolerance. Negative for heat intolerance, polydipsia, polyphagia and polyuria.   Genitourinary: Positive for frequency. Negative for decreased urine volume and difficulty urinating.   Musculoskeletal: Positive for arthralgias, back pain, gait problem and neck stiffness.   Skin: Negative.  Negative for color change and pallor.   Allergic/Immunologic: Negative.  Negative for environmental allergies, food allergies and immunocompromised state.   Neurological: Positive for syncope. Negative for dizziness, tremors, seizures, facial asymmetry, speech difficulty, weakness and light-headedness.   Hematological: Negative.  Negative for adenopathy. Does not bruise/bleed easily.   Psychiatric/Behavioral: Positive for decreased concentration. Negative for agitation, behavioral problems, confusion, dysphoric mood, hallucinations, self-injury, sleep disturbance and suicidal ideas. The patient is nervous/anxious and has insomnia. The patient is not hyperactive.        Objective   /60   Pulse 84   Ht 180.3 cm (71\")   Wt 94.3 kg (208 lb)   SpO2 98%   BMI 29.01 kg/m²   Physical Exam  Vitals and nursing note reviewed.   Constitutional:       Appearance: Normal appearance. He is obese.   HENT:      Head: Normocephalic.      Comments: Limited vision     Right Ear: Tympanic membrane normal.      Nose: Nose normal.      Mouth/Throat:      Mouth: Mucous membranes are moist.   Eyes:      Pupils: Pupils are equal, round, and reactive to light.   Cardiovascular:      Rate and Rhythm: " Normal rate.      Pulses: Normal pulses.      Heart sounds: No murmur heard.    No gallop.      Comments: Abnormal ekg   Pulmonary:      Effort: Pulmonary effort is normal. No respiratory distress.      Breath sounds: No stridor. No wheezing, rhonchi or rales.   Chest:      Chest wall: No tenderness.   Abdominal:      General: Abdomen is flat. There is no distension.      Palpations: There is no mass.      Tenderness: There is abdominal tenderness.      Hernia: No hernia is present.      Comments: Healing surgical wound lower abdomen    Musculoskeletal:         General: No swelling, tenderness, deformity or signs of injury. Normal range of motion.      Cervical back: Normal range of motion.   Feet:      Comments: Thickened toenails   Skin:     General: Skin is warm.      Coloration: Skin is not jaundiced or pale.      Findings: No bruising or erythema.   Neurological:      General: No focal deficit present.      Mental Status: He is alert and oriented to person, place, and time.      Cranial Nerves: No cranial nerve deficit.      Sensory: No sensory deficit.      Motor: No weakness.      Coordination: Coordination normal.   Psychiatric:         Mood and Affect: Mood normal.         Behavior: Behavior normal.              Assessment & Plan     Problems Addressed this Visit    None  Visit Diagnoses     Palpitations    -  Primary    Relevant Orders    Ambulatory Referral to Cardiology    Abnormal EKG        Relevant Orders    Ambulatory Referral to Cardiology    Thickened nails        Relevant Orders    Ambulatory Referral to Podiatry    Primary insomnia          Diagnoses       Codes Comments    Palpitations    -  Primary ICD-10-CM: R00.2  ICD-9-CM: 785.1     Abnormal EKG     ICD-10-CM: R94.31  ICD-9-CM: 794.31     Thickened nails     ICD-10-CM: L60.2  ICD-9-CM: 703.8     Primary insomnia     ICD-10-CM: F51.01  ICD-9-CM: 307.42            New Medications Ordered This Visit   Medications   • hydrOXYzine (ATARAX) 10 MG  tablet     Sig: Take 1 to 2 tablets by mouth at night.     Dispense:  60 tablet     Refill:  11     Current Outpatient Medications on File Prior to Visit   Medication Sig Dispense Refill   • albuterol (PROVENTIL HFA;VENTOLIN HFA) 108 (90 Base) MCG/ACT inhaler Inhale 2 puffs Every 4 (Four) Hours As Needed for Wheezing. 18 g 11   • allopurinol (ZYLOPRIM) 100 MG tablet Take 1 tablet by mouth 2 (Two) Times a Day. 180 tablet 0   • amLODIPine (NORVASC) 10 MG tablet Take 1 tablet by mouth Daily. 90 tablet 3   • aspirin 81 MG EC tablet Take 1 tablet by mouth Every Night.     • glucose blood (FREESTYLE LITE) test strip Use to test blood sugar up to 3 times daily **Freestyle Lite** (Patient taking differently: Use to test blood sugar up to 3 times daily **Freestyle Lite**) 100 each 12   • hydroxychloroquine (PLAQUENIL) 200 MG tablet Take 1 tablet by mouth 2 (Two) Times a Day. 180 tablet 3   • Lancets (freestyle) lancets Use to test blood sugar up to 3 times daily **Freestyle** (Patient taking differently: Use to test blood sugar up to 3 times daily **Freestyle**) 100 each 12   • metoprolol succinate XL (TOPROL-XL) 25 MG 24 hr tablet Take 1 tablet by mouth Every Night.     • omeprazole (priLOSEC) 40 MG capsule Take 1 capsule by mouth Daily.     • potassium citrate (UROCIT-K) 10 MEQ (1080 MG) CR tablet Take 2 tablets by mouth 4 (Four) Times a Day for hypokalemia. 240 tablet 1   • Unable to find 1 each 1 (One) Time. Med Name:Physician's Choice Probiotic     • valsartan-hydrochlorothiazide (DIOVAN-HCT) 320-25 MG per tablet Take 1 tablet by mouth Daily. 90 tablet 1   • vitamin D (ERGOCALCIFEROL) 1.25 MG (02141 UT) capsule capsule Take 1 capsule by mouth Every 7 (Seven) Days. 4 capsule 11     No current facility-administered medications on file prior to visit.       15 minutes   Follow Up   No follow-ups on file.        meds as directed   Add atarax prn   Refer to podiatry for thickened toenails  Cardiology for abnormal ekg  No  changes for now -monitor pulse at home

## 2022-11-17 NOTE — PROGRESS NOTES
CHIEF COMPLAINT:   Chief Complaint   Patient presents with   • Post-op Follow-up     R Hemicolectomy 11/4       HPI: This patient presents for a post-operative visit after undergoing a open right hemicolectomy.  Patient is recovering well and slowly returning to normal activities. One episode of nausea, no vomiting. Having bowel movements.     PATHOLOGY:   COLON RESECTION FOR TUMOR, RIGHT:   Tubulovillous adenoma, cecum   Tubular adenomas (2), ascending colon   Vermiform appendix:  No significant histologic abnormality   Mesenteric lymph nodes (9):  Foreign material, consistent with black tattoo   pigment   Negative for evidence of malignancy     PHYSICAL EXAM:    ABD: Incisions are healing well without any erythema or signs of infection.    ASSESSMENT:    Diagnoses and all orders for this visit:    1. History of colon polyps (Primary)        PLAN:    1.The patient will follow-up in 4 weeks  2.No heavy lifting for six weeks postopertively  3. Current outpatient and discharge medications have been reconciled for the patient.  Reviewed by: Sylvester Fowler MD          This document has been electronically signed by Sylvester Fowler MD on November 17, 2022 11:02 CST

## 2022-11-18 ENCOUNTER — READMISSION MANAGEMENT (OUTPATIENT)
Dept: CALL CENTER | Facility: HOSPITAL | Age: 72
End: 2022-11-18

## 2022-11-18 LAB
QT INTERVAL: 374 MS
QTC INTERVAL: 426 MS

## 2022-11-18 NOTE — OUTREACH NOTE
General Surgery Week 2 Survey    Flowsheet Row Responses   [facility] facility patient discharged from? [place]   Does the patient have one of the following disease processes/diagnoses(primary or secondary)? General Surgery   Week 2 attempt successful? No   Unsuccessful attempts Attempt 1          JULIANNA Tucker Registered Nurse

## 2022-11-21 DIAGNOSIS — R60.0 EDEMA OF RIGHT UPPER ARM: Primary | ICD-10-CM

## 2022-11-21 DIAGNOSIS — R60.0 EXTREMITY EDEMA: Primary | ICD-10-CM

## 2022-11-22 ENCOUNTER — HOSPITAL ENCOUNTER (OUTPATIENT)
Dept: ULTRASOUND IMAGING | Facility: HOSPITAL | Age: 72
Discharge: HOME | End: 2022-11-22
Admitting: NURSE PRACTITIONER

## 2022-11-22 ENCOUNTER — READMISSION MANAGEMENT (OUTPATIENT)
Dept: CALL CENTER | Facility: HOSPITAL | Age: 72
End: 2022-11-22

## 2022-11-22 DIAGNOSIS — R60.0 EXTREMITY EDEMA: ICD-10-CM

## 2022-11-22 PROCEDURE — 93971 EXTREMITY STUDY: CPT

## 2022-11-22 NOTE — OUTREACH NOTE
General Surgery Week 2 Survey    Flowsheet Row Responses   [facility] facility patient discharged from? [place]   Does the patient have one of the following disease processes/diagnoses(primary or secondary)? General Surgery   Week 2 attempt successful? No   Unsuccessful attempts Attempt 2          MELANIE VASQUEZ - Licensed Nurse

## 2022-12-02 ENCOUNTER — READMISSION MANAGEMENT (OUTPATIENT)
Dept: CALL CENTER | Facility: HOSPITAL | Age: 72
End: 2022-12-02

## 2022-12-02 NOTE — OUTREACH NOTE
General Surgery Week 3 Survey    Flowsheet Row Responses   [facility] patient discharged from? [place]   Does the patient have one of the following disease processes/diagnoses(primary or secondary)? General Surgery   Week 3 attempt successful? Yes   Call start time 1444   Call end time 1445   Discharge diagnosis Laparoscopic converted to open right hemicolectomy   Person spoke with today (if not patient) and relationship Jessie, wife   Meds reviewed with patient/caregiver? Yes   Is the patient taking all medications as directed (includes completed medication regime)? Yes   Does the patient have a follow up appointment scheduled with their surgeon? Yes  [11/15/22]   Has the patient kept scheduled appointments due by today? Yes   What is the patient's perception of their health status since discharge? Improving   If the patient is a current smoker, are they able to teach back resources for cessation? Not a smoker   Is the patient/caregiver able to teach back the hierarchy of who to call/visit for symptoms/problems? PCP, Specialist, Home health nurse, Urgent Care, ED, 911 Yes   Week 3 call completed? Yes   Revoked No further contact(revokes)-requires comment   Is the patient interested in additional calls from an ambulatory ?  NOTE:  applies to high risk patients requiring additional follow-up. No   Graduated/Revoked comments Wife reports patient is doing well          NENO SMYTH - Registered Nurse

## 2022-12-06 ENCOUNTER — OFFICE VISIT (OUTPATIENT)
Dept: SURGERY | Facility: CLINIC | Age: 72
End: 2022-12-06

## 2022-12-06 VITALS
TEMPERATURE: 97.1 F | WEIGHT: 206 LBS | HEART RATE: 74 BPM | OXYGEN SATURATION: 99 % | HEIGHT: 71 IN | BODY MASS INDEX: 28.84 KG/M2 | DIASTOLIC BLOOD PRESSURE: 60 MMHG | SYSTOLIC BLOOD PRESSURE: 126 MMHG

## 2022-12-06 DIAGNOSIS — Z86.010 HISTORY OF COLON POLYPS: Primary | ICD-10-CM

## 2022-12-06 PROCEDURE — 99024 POSTOP FOLLOW-UP VISIT: CPT | Performed by: STUDENT IN AN ORGANIZED HEALTH CARE EDUCATION/TRAINING PROGRAM

## 2022-12-07 NOTE — PROGRESS NOTES
CHIEF COMPLAINT:   Chief Complaint   Patient presents with   • Post-op     Post op 3 week reval       HPI: This patient presents for a post-operative visit after undergoing a laparoscopic right hemicolectomy.  Patient reports no problems. Eating well without any significant nausea. Having good bowel function. No problems with constipation or diarrhea. No urinary complaints. Denies fever. Ambulating well and slowly returning to normal activities.      PHYSICAL EXAM:    ABD: Incisions are healing well without any erythema or signs of infection.    ASSESSMENT:    There are no diagnoses linked to this encounter.    PLAN:    1.The patient will follow-up on a prn basis unless there are any problems.  2.May return to normal activity without restrictions.        This document has been electronically signed by Sylvester Fowler MD on December 7, 2022 10:37 CST

## 2022-12-09 RX ORDER — ALLOPURINOL 100 MG/1
100 TABLET ORAL 2 TIMES DAILY
Qty: 180 TABLET | Refills: 0 | Status: SHIPPED | OUTPATIENT
Start: 2022-12-09 | End: 2023-03-05 | Stop reason: SDUPTHER

## 2022-12-09 RX ORDER — POTASSIUM CITRATE 10 MEQ/1
20 TABLET, EXTENDED RELEASE ORAL 4 TIMES DAILY
Qty: 240 TABLET | Refills: 1 | Status: SHIPPED | OUTPATIENT
Start: 2022-12-09

## 2022-12-13 ENCOUNTER — OFFICE VISIT (OUTPATIENT)
Dept: CARDIOLOGY | Facility: CLINIC | Age: 72
End: 2022-12-13

## 2022-12-13 VITALS
WEIGHT: 207.2 LBS | HEIGHT: 71 IN | TEMPERATURE: 97.1 F | BODY MASS INDEX: 29.01 KG/M2 | SYSTOLIC BLOOD PRESSURE: 132 MMHG | HEART RATE: 70 BPM | DIASTOLIC BLOOD PRESSURE: 70 MMHG | OXYGEN SATURATION: 98 %

## 2022-12-13 DIAGNOSIS — I10 ESSENTIAL HYPERTENSION: ICD-10-CM

## 2022-12-13 DIAGNOSIS — E78.2 MIXED HYPERLIPIDEMIA: ICD-10-CM

## 2022-12-13 DIAGNOSIS — I48.0 AF (PAROXYSMAL ATRIAL FIBRILLATION): ICD-10-CM

## 2022-12-13 DIAGNOSIS — R00.2 PALPITATION: Primary | ICD-10-CM

## 2022-12-13 DIAGNOSIS — R94.31 ABNORMAL EKG: ICD-10-CM

## 2022-12-13 LAB
QT INTERVAL: 388 MS
QTC INTERVAL: 419 MS

## 2022-12-13 PROCEDURE — 99204 OFFICE O/P NEW MOD 45 MIN: CPT | Performed by: INTERNAL MEDICINE

## 2022-12-13 PROCEDURE — 93000 ELECTROCARDIOGRAM COMPLETE: CPT | Performed by: INTERNAL MEDICINE

## 2022-12-13 NOTE — PROGRESS NOTES
Henrik Sands  72 y.o. male    12/13/2022  1. Palpitation    2. AF (paroxysmal atrial fibrillation) (HCC)    3. Essential hypertension    4. Mixed hyperlipidemia    5. Abnormal EKG        History of Present Illness  Mr. Sands is a 72-year-old male who is being seen by me after very long interval.  His history is remarkable for hypertension, diabetes mellitus, history of colon carcinoma status post resection and chemotherapy in 2007, CKD, gastroesophageal reflux and noncritical CAD by cardiac catheterization in July 2018, after an abnormal CT angiogram of the coronary arteries.    Cardiac catheterization in 7/20/2018 showed:  Left main coronary artery: This was a patent vessel with minor plaques which divided into a left anterior descending coronary artery, ramus intermedius coronary artery and left circumflex coronary artery  Left anterior descending coronary artery: This was a medium-sized vessel with minor luminal irregularities and calcification in the proximal segment, mild ectatic segment in the mid Left Anterior Descending Coronary Artery with minor calcification and at the point of origin of diagonal 2 there was an eccentric noncritical disease up to 40%.  The mid and distal LAD was a small caliber vessel.  Diagonal 1 was a patent vessel.  No flow-limiting lesions noted in the LAD  Ramus intermedius coronary artery: This was a medium size vessel which was patent  Left circumflex coronary artery : This was a medium-sized tortuous vessel which was patent with minor luminal irregularities  Right Coronary Artery: This was a dominant vessel with minor plaques and calcification the proximal and midsegment with no flow-limiting lesions.  Noncritical disease up to 20-30% was noted in the mid RCA and distal RCA had minor plaques.  PDA and PLV branches were patent.  Left ventriculogram: Left ventricular systolic function was normal with an ejection fraction of 55% with no wall motion abnormalities.  Aortic  "pressure was 110/55 and left ventricular pressure was 118/6 mmHg.  Impression: Normal left ventricular systolic function with no wall motion abnormalities.  Estimated ejection fraction was 55%.  Noncritical lesions noted in the coronary arteries as described above.  Chest pain most likely noncardiac.     His last echocardiogram was in July 2018 which showed the following findings:  • Left ventricular systolic function is normal. Estimated EF = 56%.  • Left ventricular diastolic dysfunction (grade I) consistent with impaired relaxation.     In November 2022, the patient underwent Laparoscopic converted to open right hemicolectomy, and during the recovery, the patient was noted to have delirium.  Apparently an EKG raise question of atrial fibrillation.  I did review the EKG and there was baseline artifact that made it difficult to interpret.  However I agree that it atrial arrhythmia cannot entirely be ruled out.  Following discharge from the hospital the patient has had EKG which showed sinus rhythm and EKG today showed the following findings:    EKG today showed sinus rhythm with first-degree AV block.  Baseline artifacts.  QTc interval 419 ms.    He denied any palpitation, chest pain, dyspnea or syncope.    SUBJECTIVE    Allergies   Allergen Reactions   • Latex Rash         Past Medical History:   Diagnosis Date   • Abdominal bloating    • After-cataract with vision obscured     left   • Allergic rhinitis    • Arthritis     RA   • Artificial lens present     both   • Asthma     \"mild\"   • Colon cancer (HCC)    • Colonic polyp     Polyp of large intestine - multiple      • Cranial nerve disorder, unspecified      right 3rd x 10days to 2wks      • Diabetes mellitus (HCC)    • Disorder of skin     lesion to right forehead      • Essential hypertension     which may be renovascular in origin      • MEL (generalized anxiety disorder)    • Generalized colicky abdominal pain    • GERD (gastroesophageal reflux disease)  "   • History of colonic polyps    • History of echocardiogram 04/25/2008    normal systolic function,minimal eft atrial enlargement,aortic root upper limits of normal   • History of malignant neoplasm of colon    • Hyperlipidemia    • Hypokalemia     persistent   • Ischemic optic neuropathy of both eyes    • Kidney stone     with hyperoxaluria now for 1st time      • Long term use of drug    • Malaise and fatigue    • Neoplasm of uncertain behavior of skin    • 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   • Osteoarthritis    • Pain in lower limb    • Primary malignant neoplasm of colon (HCC)    • Pseudophakia    • Renal impairment     stage 1   • Skin cancer     basal cell   • Stasis edema    • Type 2 diabetes mellitus (HCC)     no BDR   • Vitamin D deficiency     on treatment         Past Surgical History:   Procedure Laterality Date   • CARDIAC CATHETERIZATION  04/25/2008    selective coronary angiography,right iliofemoral,mild nonobstructive CAD,potential for dilated ascending aorta given the necessity of using a JL5   • CARDIAC CATHETERIZATION N/A 7/19/2018    Procedure: Left Heart Cath/ PCI if indicated;  Surgeon: Erik So MD;  Location: [facility] CATH INVASIVE LOCATION;  Service: Cardiovascular   • CATARACT EXTRACTION  06/18/2014    AFTER CATARACT LASER SURGERY 34250 (1)      • CATARACT EXTRACTION W/ INTRAOCULAR LENS IMPLANT Left 12/19/2006   • COLECTOMY PARTIAL / TOTAL  01/31/2007    low anterior resection of the colon with stapled colorectal anastomosis.Middle rectal cancer w/suspected sigmoid serosal implants at the rectosigmoid junction   • COLON RESECTION N/A 11/4/2022    Procedure: LAPAROSCOPIC TO OPEN RIGHT HEMICOLECTOMY AND LYSIS OF ADHESIONS;  Surgeon: Sylvester Fowler MD;  Location: [facility] OR;  Service: General;  Laterality: N/A;  converted to open at 1144   • COLONOSCOPY N/A 9/21/2022    Procedure: COLONOSCOPY--bx;  Surgeon: Nehemiah Ramirez MD;  Location: [facility] ENDOSCOPY;  Service: Gastroenterology;   Laterality: N/A;   • COLONOSCOPY W/ POLYPECTOMY  02/11/2016    Patent end-to-end colo-colonic anastomosis.Melanosis in the colon.One 2 mm polyp at 85 cm prox.to anus.Resected and retrieved.One 5 mm polyp at 80 cm prox to anus.Resected and retrieved.One 1 mm polyp at 40 cm prox. to anus.Resected and retrieved.   • EXCISION LESION  11/06/2001    Electrodesiccation and curettage x 3, right upper back   • EXTRACORPOREAL SHOCK WAVE LITHOTRIPSY (ESWL)  08/24/2005    right extracorporeal shock wave 1000 shocks at an energy level 9.Bilateral ureteral calculus,right one symptomatic.Stone 5 x7   • MEDIPORT INSERTION, SINGLE  04/11/2007    left subclavin Mediport,metastatic colon cancer         History reviewed. No pertinent family history.      Social History     Socioeconomic History   • Marital status:    Tobacco Use   • Smoking status: Never   • Smokeless tobacco: Never   Vaping Use   • Vaping Use: Never used   Substance and Sexual Activity   • Alcohol use: No   • Drug use: Never   • Sexual activity: Defer         Current Outpatient Medications   Medication Sig Dispense Refill   • albuterol (PROVENTIL HFA;VENTOLIN HFA) 108 (90 Base) MCG/ACT inhaler Inhale 2 puffs Every 4 (Four) Hours As Needed for Wheezing. 18 g 11   • allopurinol (ZYLOPRIM) 100 MG tablet Take 1 tablet by mouth 2 (Two) Times a Day. 180 tablet 0   • amLODIPine (NORVASC) 10 MG tablet Take 1 tablet by mouth Daily. 90 tablet 3   • aspirin 81 MG EC tablet Take 1 tablet by mouth Every Night.     • glucose blood (FREESTYLE LITE) test strip Use to test blood sugar up to 3 times daily **Freestyle Lite** (Patient taking differently: Use to test blood sugar up to 3 times daily **Freestyle Lite**) 100 each 12   • hydroxychloroquine (PLAQUENIL) 200 MG tablet Take 1 tablet by mouth 2 (Two) Times a Day. 180 tablet 3   • hydrOXYzine (ATARAX) 10 MG tablet Take 1 to 2 tablets by mouth at night. 60 tablet 11   • Lancets (freestyle) lancets Use to test blood sugar up  "to 3 times daily **Freestyle** (Patient taking differently: Use to test blood sugar up to 3 times daily **Freestyle**) 100 each 12   • metoprolol succinate XL (TOPROL-XL) 25 MG 24 hr tablet Take 1 tablet by mouth Every Night.     • omeprazole (priLOSEC) 40 MG capsule Take 1 capsule by mouth Daily.     • potassium citrate (UROCIT-K) 10 MEQ (1080 MG) CR tablet Take 2 tablets by mouth 4 (Four) Times a Day for hypokalemia. 240 tablet 1   • Unable to find 1 each 1 (One) Time. Med Name:Physician's Choice Probiotic     • valsartan-hydrochlorothiazide (DIOVAN-HCT) 320-25 MG per tablet Take 1 tablet by mouth Daily. 90 tablet 1   • vitamin D (ERGOCALCIFEROL) 1.25 MG (12722 UT) capsule capsule Take 1 capsule by mouth Every 7 (Seven) Days. 4 capsule 11     No current facility-administered medications for this visit.         OBJECTIVE    /70 (BP Location: Left arm, Patient Position: Sitting, Cuff Size: Adult)   Pulse 70   Temp 97.1 °F (36.2 °C)   Ht 180.3 cm (71\")   Wt 94 kg (207 lb 3.2 oz)   SpO2 98%   BMI 28.90 kg/m²         Review of Systems     Constitutional:  Denies recent weight loss, weight gain, fever or chills     HENT:  hearing loss, epistaxis, no hoarseness, or difficulty speaking.     Eyes: Visual loss both eyes.    Respiratory:  Denies dyspnea with exertion, no cough, wheezing, or hemoptysis.     Cardiovascular: See HPI, history of syncope in the past.    Gastrointestinal: Recent laparoscopic right hemicolectomy, history of colon carcinoma, colonic polyps, appendiceal tumor    Endocrine: Negative for cold intolerance, heat intolerance, polydipsia, polyphagia and polyuria.     Genitourinary: Negative.      Musculoskeletal: Denies any history of arthritic symptoms or back problems     Skin:  Denies any change in hair or nails, rashes, or skin lesions.     Allergic/Immunologic: Negative.  Negative for environmental allergies, food allergies and/or immunocompromised state.     Neurological:  Denies any " history of recurrent headaches, strokes, TIA, or seizure disorder.     Hematological: Denies any food allergies, seasonal allergies, bleeding disorders, or lymphadenopathy.     Psychiatric/Behavioral: Denies any history of depression, substance abuse, or change in cognitive function.         Physical Exam     Constitutional: Cooperative, alert and oriented, in no acute distress.     HENT:   Head: Normocephalic, normal hair patterns, no masses or tenderness.  Ears, Nose, and Throat: No gross abnormalities. No pallor or cyanosis. Dentition good.   Eyes: EOMS intact, PERRL, conjunctivae and lids unremarkable. Fundoscopic exam and visual fields not performed.   Neck: No palpable masses or adenopathy, no thyromegaly, no JVD, carotid pulses are full and equal bilaterally and without bruit.     Cardiovascular: Irregular rhythm, S1 and S2 normal, no S3 or S4.  No murmurs, gallops, or rubs detected.  Occasional ectopic beats    Pulmonary/Chest: Chest: normal symmetry, normal respiratory excursion, no intercostal retraction, no use of accessory muscles.     Pulmonary: Normal breath sounds. No rales or rhonchi.    Abdominal: Abdomen soft, bowel sounds normoactive, no masses, no hepatosplenomegaly, nontender, no bruit.     Musculoskeletal: No deformities, clubbing, cyanosis, erythema, or edema observed.     Neurological: No gross motor or sensory deficits noted, affect appropriate, oriented to time, person, place.     Skin: Warm and dry to the touch, no apparent skin lesion or mass noted.     Psychiatric: He has a normal mood and affect. His behavior is normal. Judgment and thought content normal.         Procedures      Lab Results   Component Value Date    WBC 8.72 11/08/2022    HGB 14.2 11/08/2022    HCT 41.9 11/08/2022    MCV 85.7 11/08/2022     11/08/2022     Lab Results   Component Value Date    GLUCOSE 100 (H) 11/08/2022    BUN 12 11/08/2022    CREATININE 1.05 11/08/2022    EGFRIFNONA 58 (L) 12/02/2020    BCR 11.4  11/08/2022    CO2 23.0 11/08/2022    CALCIUM 9.2 11/08/2022    ALBUMIN 3.70 11/04/2022    AST 22 11/04/2022    ALT 11 11/04/2022     Lab Results   Component Value Date    CHOL 156 06/10/2022    CHOL 152 12/02/2020    CHOL 168 03/05/2019     Lab Results   Component Value Date    TRIG 71 06/10/2022    TRIG 94 12/02/2020    TRIG 105 03/05/2019     Lab Results   Component Value Date    HDL 40 06/10/2022    HDL 39 (L) 12/02/2020    HDL 32 (L) 03/05/2019     No components found for: LDLCALC  Lab Results   Component Value Date     (H) 06/10/2022    LDL 95 12/02/2020    LDL 99 03/05/2019     No results found for: HDLLDLRATIO  No components found for: CHOLHDL  Lab Results   Component Value Date    HGBA1C 5.70 (H) 12/02/2020     Lab Results   Component Value Date    TSH 2.140 06/10/2022           ASSESSMENT AND PLAN  Henrik Sands is a 72-year-old male with multiple medical issues as discussed in detail in the history of present illness.  He was suspected to have an episode of atrial fibrillation during recovery recently, following colon surgery.  The patient denied any cardiac symptoms at the present time but based on his history, paroxysmal atrial fibrillation cannot be ruled out.  No signs to suggest angina, congestive heart failure was noted.  After reviewing his medicines, I have continued antiplatelet therapy with aspirin, antihypertensive therapy with amlodipine, metoprolol succinate, valsartan HCTZ.  The plan will be to proceed with an event monitor for 2 weeks to see if he has episodes of paroxysmal atrial fibrillation.  An echocardiogram to assess left ventricular and valvular function has also been arranged.  If he indeed has atrial fibrillation, anticoagulation will be considered.  Further recommendations will follow.  Thank you for asked me to see this patient.    Diagnoses and all orders for this visit:    1. Palpitation (Primary)  -     Adult Transthoracic Echo Complete w/ Color, Spectral and Contrast  if Necessary Per Protocol; Future  -     Mobile Cardiac Outpatient Telemetry; Future    2. AF (paroxysmal atrial fibrillation) (HCC)  -     Adult Transthoracic Echo Complete w/ Color, Spectral and Contrast if Necessary Per Protocol; Future  -     Mobile Cardiac Outpatient Telemetry; Future    3. Essential hypertension  -     Adult Transthoracic Echo Complete w/ Color, Spectral and Contrast if Necessary Per Protocol; Future    4. Mixed hyperlipidemia  -     Adult Transthoracic Echo Complete w/ Color, Spectral and Contrast if Necessary Per Protocol; Future    5. Abnormal EKG  -     ECG 12 Lead  -     Adult Transthoracic Echo Complete w/ Color, Spectral and Contrast if Necessary Per Protocol; Future        BMI is >= 25 and <30. (Overweight) The following options were offered after discussion;: nutrition counseling/recommendations      Henrik Sands  reports that he has never smoked. He has never used smokeless tobacco.             Erik So MD  12/13/2022  10:16 CST

## 2022-12-14 ENCOUNTER — TELEPHONE (OUTPATIENT)
Dept: CARDIOLOGY | Facility: CLINIC | Age: 72
End: 2022-12-14

## 2022-12-14 RX ORDER — METOPROLOL SUCCINATE 50 MG/1
50 TABLET, EXTENDED RELEASE ORAL NIGHTLY
Qty: 90 TABLET | Refills: 0 | Status: SHIPPED | OUTPATIENT
Start: 2022-12-14 | End: 2023-03-29 | Stop reason: SDUPTHER

## 2022-12-14 NOTE — TELEPHONE ENCOUNTER
Called pt monitor showed afib Dr Coughlin increase pt metoprolol from 25mg to 50mg and include Eliquis 5mg BID pt notified and understood

## 2023-01-13 ENCOUNTER — OFFICE VISIT (OUTPATIENT)
Dept: PODIATRY | Facility: CLINIC | Age: 73
End: 2023-01-13
Payer: MEDICARE

## 2023-01-13 VITALS — BODY MASS INDEX: 28.98 KG/M2 | OXYGEN SATURATION: 98 % | HEIGHT: 71 IN | WEIGHT: 207 LBS | HEART RATE: 60 BPM

## 2023-01-13 DIAGNOSIS — M79.675 TOE PAIN, BILATERAL: ICD-10-CM

## 2023-01-13 DIAGNOSIS — M79.674 TOE PAIN, BILATERAL: ICD-10-CM

## 2023-01-13 DIAGNOSIS — E11.8 DIABETIC FOOT: ICD-10-CM

## 2023-01-13 DIAGNOSIS — E11.649 TYPE 2 DIABETES MELLITUS WITH HYPOGLYCEMIA WITHOUT COMA, WITHOUT LONG-TERM CURRENT USE OF INSULIN: ICD-10-CM

## 2023-01-13 DIAGNOSIS — B35.1 ONYCHOMYCOSIS: Primary | ICD-10-CM

## 2023-01-13 PROCEDURE — 99212 OFFICE O/P EST SF 10 MIN: CPT | Performed by: NURSE PRACTITIONER

## 2023-01-13 PROCEDURE — 11721 DEBRIDE NAIL 6 OR MORE: CPT | Performed by: NURSE PRACTITIONER

## 2023-01-13 NOTE — PROGRESS NOTES
"Henrik Sands  1950  72 y.o. male   PCP: Lisa Stacy: 11/16/2022  BS: 88 pre pt       01/13/2023    Chief Complaint   Patient presents with   • Left Foot - Follow-up     Diabetic foot care   • Right Foot - Follow-up     Diabetic foot care        History of Present Illness    Henrik Sands is a 72 y.o.male presents to the clinic today for diabetic foot care.       Past Medical History:   Diagnosis Date   • Abdominal bloating    • After-cataract with vision obscured     left   • Allergic rhinitis    • Arthritis     RA   • Artificial lens present     both   • Asthma     \"mild\"   • Colon cancer (HCC)    • Colonic polyp     Polyp of large intestine - multiple      • Cranial nerve disorder, unspecified      right 3rd x 10days to 2wks      • Diabetes mellitus (HCC)    • Disorder of skin     lesion to right forehead      • Essential hypertension     which may be renovascular in origin      • MEL (generalized anxiety disorder)    • Generalized colicky abdominal pain    • GERD (gastroesophageal reflux disease)    • History of colonic polyps    • History of echocardiogram 04/25/2008    normal systolic function,minimal eft atrial enlargement,aortic root upper limits of normal   • History of malignant neoplasm of colon    • Hyperlipidemia    • Hypokalemia     persistent   • Ischemic optic neuropathy of both eyes    • Kidney stone     with hyperoxaluria now for 1st time      • Long term use of drug    • Malaise and fatigue    • Neoplasm of uncertain behavior of skin    • 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   • Osteoarthritis    • Pain in lower limb    • Primary malignant neoplasm of colon (HCC)    • Pseudophakia    • Renal impairment     stage 1   • Skin cancer     basal cell   • Stasis edema    • Type 2 diabetes mellitus (HCC)     no BDR   • Vitamin D deficiency     on treatment         Past Surgical History:   Procedure Laterality Date   • CARDIAC CATHETERIZATION  04/25/2008    selective coronary angiography,right " iliofemoral,mild nonobstructive CAD,potential for dilated ascending aorta given the necessity of using a JL5   • CARDIAC CATHETERIZATION N/A 7/19/2018    Procedure: Left Heart Cath/ PCI if indicated;  Surgeon: Erik So MD;  Location: [facility] CATH INVASIVE LOCATION;  Service: Cardiovascular   • CATARACT EXTRACTION  06/18/2014    AFTER CATARACT LASER SURGERY 86895 (1)      • CATARACT EXTRACTION W/ INTRAOCULAR LENS IMPLANT Left 12/19/2006   • COLECTOMY PARTIAL / TOTAL  01/31/2007    low anterior resection of the colon with stapled colorectal anastomosis.Middle rectal cancer w/suspected sigmoid serosal implants at the rectosigmoid junction   • COLON RESECTION N/A 11/4/2022    Procedure: LAPAROSCOPIC TO OPEN RIGHT HEMICOLECTOMY AND LYSIS OF ADHESIONS;  Surgeon: Sylvester Fowler MD;  Location: [facility] OR;  Service: General;  Laterality: N/A;  converted to open at 1144   • COLONOSCOPY N/A 9/21/2022    Procedure: COLONOSCOPY--bx;  Surgeon: Nehemiah Ramirez MD;  Location: [facility] ENDOSCOPY;  Service: Gastroenterology;  Laterality: N/A;   • COLONOSCOPY W/ POLYPECTOMY  02/11/2016    Patent end-to-end colo-colonic anastomosis.Melanosis in the colon.One 2 mm polyp at 85 cm prox.to anus.Resected and retrieved.One 5 mm polyp at 80 cm prox to anus.Resected and retrieved.One 1 mm polyp at 40 cm prox. to anus.Resected and retrieved.   • EXCISION LESION  11/06/2001    Electrodesiccation and curettage x 3, right upper back   • EXTRACORPOREAL SHOCK WAVE LITHOTRIPSY (ESWL)  08/24/2005    right extracorporeal shock wave 1000 shocks at an energy level 9.Bilateral ureteral calculus,right one symptomatic.Stone 5 x7   • MEDIPORT INSERTION, SINGLE  04/11/2007    left subclavin Mediport,metastatic colon cancer         History reviewed. No pertinent family history.    Allergies   Allergen Reactions   • Latex Rash       Social History     Socioeconomic History   • Marital status:    Tobacco Use   • Smoking status: Never   •  Smokeless tobacco: Never   Vaping Use   • Vaping Use: Never used   Substance and Sexual Activity   • Alcohol use: No   • Drug use: Never   • Sexual activity: Defer         Current Outpatient Medications   Medication Sig Dispense Refill   • albuterol (PROVENTIL HFA;VENTOLIN HFA) 108 (90 Base) MCG/ACT inhaler Inhale 2 puffs Every 4 (Four) Hours As Needed for Wheezing. 18 g 11   • allopurinol (ZYLOPRIM) 100 MG tablet Take 1 tablet by mouth 2 (Two) Times a Day. 180 tablet 0   • amLODIPine (NORVASC) 10 MG tablet Take 1 tablet by mouth Daily. 90 tablet 3   • apixaban (ELIQUIS) 5 MG tablet tablet Take 1 tablet by mouth Every 12 (Twelve) Hours. 180 tablet 0   • aspirin 81 MG EC tablet Take 1 tablet by mouth Every Night.     • glucose blood (FREESTYLE LITE) test strip Use to test blood sugar up to 3 times daily **Freestyle Lite** (Patient taking differently: Use to test blood sugar up to 3 times daily **Freestyle Lite**) 100 each 12   • hydroxychloroquine (PLAQUENIL) 200 MG tablet Take 1 tablet by mouth 2 (Two) Times a Day. 180 tablet 3   • hydrOXYzine (ATARAX) 10 MG tablet Take 1 to 2 tablets by mouth at night. 60 tablet 11   • Lancets (freestyle) lancets Use to test blood sugar up to 3 times daily **Freestyle** (Patient taking differently: Use to test blood sugar up to 3 times daily **Freestyle**) 100 each 12   • metoprolol succinate XL (TOPROL-XL) 50 MG 24 hr tablet Take 1 tablet by mouth Every Night. 90 tablet 0   • omeprazole (priLOSEC) 40 MG capsule Take 1 capsule by mouth Daily.     • potassium citrate (UROCIT-K) 10 MEQ (1080 MG) CR tablet Take 2 tablets by mouth 4 (Four) Times a Day for hypokalemia. 240 tablet 1   • Unable to find 1 each 1 (One) Time. Med Name:Physician's Choice Probiotic     • valsartan-hydrochlorothiazide (DIOVAN-HCT) 320-25 MG per tablet Take 1 tablet by mouth Daily. 90 tablet 1   • vitamin D (ERGOCALCIFEROL) 1.25 MG (58435 UT) capsule capsule Take 1 capsule by mouth Every 7 (Seven) Days. 4  "capsule 11     No current facility-administered medications for this visit.       Review of Systems   Constitutional: Negative.    HENT: Negative.    Eyes: Negative.    Respiratory: Negative.    Cardiovascular: Negative.    Gastrointestinal: Negative.    Endocrine: Negative.    Genitourinary: Negative.    Musculoskeletal:        Foot  care   Skin: Negative.    Allergic/Immunologic: Negative.    Neurological: Negative.    Hematological: Negative.    Psychiatric/Behavioral: Negative.          OBJECTIVE    Pulse 60   Ht 180.3 cm (71\")   Wt 93.9 kg (207 lb)   SpO2 98%   BMI 28.87 kg/m²     Physical Exam  Vitals reviewed.   Constitutional:       Appearance: Normal appearance. He is well-developed.   HENT:      Head: Normocephalic and atraumatic.   Neck:      Trachea: Trachea and phonation normal.   Cardiovascular:      Pulses:           Dorsalis pedis pulses are 1+ on the right side and 1+ on the left side.        Posterior tibial pulses are 1+ on the right side and 1+ on the left side.   Pulmonary:      Effort: Pulmonary effort is normal. No respiratory distress.   Abdominal:      General: There is no distension.      Palpations: Abdomen is soft.   Feet:      Right foot:      Protective Sensation: 10 sites tested. 10 sites sensed.      Toenail Condition: Right toenails are abnormally thick and long. Fungal disease present.     Left foot:      Protective Sensation: 10 sites tested. 10 sites sensed.      Toenail Condition: Left toenails are abnormally thick and long. Fungal disease present.  Skin:     General: Skin is warm and dry.   Neurological:      Mental Status: He is alert and oriented to person, place, and time.      GCS: GCS eye subscore is 4. GCS verbal subscore is 5. GCS motor subscore is 6.   Psychiatric:         Speech: Speech normal.         Behavior: Behavior normal. Behavior is cooperative.         Thought Content: Thought content normal.         Judgment: Judgment normal.      "           Procedures        ASSESSMENT AND PLAN    Diagnoses and all orders for this visit:    1. Onychomycosis (Primary)    2. Diabetic foot (HCC)    3. Type 2 diabetes mellitus with hypoglycemia without coma, without long-term current use of insulin (HCC)    4. Toe pain, bilateral        - A diabetic foot screening exam was performed and the patient was educated on the foot complications related to diabetes,  preventative foot care and what signs and symptoms to watch for.  Instructed to contact our office if any foot problems develop before next visit.  -Discussed treatments for  painful toenails. Treatment options discussed included nail removal versus debridement. Patient elected for routine nail debridement. An ABN has been signed by the patient.  - Toenails 1-5 bilateral were debrided in length and thickness with nail nipper and electric  to decrease fungal load and risk of infection.  - All the patients questions were answered.  - RTC 3 months or sooner if needed.           This document has been electronically signed by Chance SLATER, FNP-C, ONP-C on January 13, 2023 14:43 CST

## 2023-02-06 ENCOUNTER — TELEPHONE (OUTPATIENT)
Dept: FAMILY MEDICINE CLINIC | Facility: CLINIC | Age: 73
End: 2023-02-06
Payer: MEDICARE

## 2023-02-06 NOTE — TELEPHONE ENCOUNTER
Patients wife called wanting to know if the jury duty letter can be left at  for her to  tomorrow. She said she would be coming to town tomorrow.

## 2023-03-06 RX ORDER — ALLOPURINOL 100 MG/1
100 TABLET ORAL 2 TIMES DAILY
Qty: 180 TABLET | Refills: 0 | Status: SHIPPED | OUTPATIENT
Start: 2023-03-06

## 2023-03-29 RX ORDER — METOPROLOL SUCCINATE 50 MG/1
50 TABLET, EXTENDED RELEASE ORAL NIGHTLY
Qty: 90 TABLET | Refills: 3 | Status: SHIPPED | OUTPATIENT
Start: 2023-03-29

## 2023-04-11 RX ORDER — VALSARTAN AND HYDROCHLOROTHIAZIDE 320; 25 MG/1; MG/1
1 TABLET, FILM COATED ORAL DAILY
Qty: 90 TABLET | Refills: 0 | Status: SHIPPED | OUTPATIENT
Start: 2023-04-11

## 2023-04-11 RX ORDER — POTASSIUM CITRATE 10 MEQ/1
20 TABLET, EXTENDED RELEASE ORAL 4 TIMES DAILY
Qty: 240 TABLET | Refills: 0 | Status: SHIPPED | OUTPATIENT
Start: 2023-04-11

## 2023-04-14 ENCOUNTER — OFFICE VISIT (OUTPATIENT)
Dept: PODIATRY | Facility: CLINIC | Age: 73
End: 2023-04-14
Payer: MEDICARE

## 2023-04-14 VITALS
WEIGHT: 207 LBS | DIASTOLIC BLOOD PRESSURE: 72 MMHG | OXYGEN SATURATION: 98 % | BODY MASS INDEX: 28.98 KG/M2 | SYSTOLIC BLOOD PRESSURE: 146 MMHG | HEIGHT: 71 IN | HEART RATE: 62 BPM

## 2023-04-14 DIAGNOSIS — E11.649 TYPE 2 DIABETES MELLITUS WITH HYPOGLYCEMIA WITHOUT COMA, WITHOUT LONG-TERM CURRENT USE OF INSULIN: ICD-10-CM

## 2023-04-14 DIAGNOSIS — E11.8 DIABETIC FOOT: ICD-10-CM

## 2023-04-14 DIAGNOSIS — M79.675 TOE PAIN, BILATERAL: ICD-10-CM

## 2023-04-14 DIAGNOSIS — H54.3 BLIND IN BOTH EYES: ICD-10-CM

## 2023-04-14 DIAGNOSIS — M79.674 TOE PAIN, BILATERAL: ICD-10-CM

## 2023-04-14 DIAGNOSIS — B35.1 ONYCHOMYCOSIS: Primary | ICD-10-CM

## 2023-04-14 PROCEDURE — 3078F DIAST BP <80 MM HG: CPT | Performed by: NURSE PRACTITIONER

## 2023-04-14 PROCEDURE — 3077F SYST BP >= 140 MM HG: CPT | Performed by: NURSE PRACTITIONER

## 2023-04-14 PROCEDURE — 99212 OFFICE O/P EST SF 10 MIN: CPT | Performed by: NURSE PRACTITIONER

## 2023-04-14 PROCEDURE — 11721 DEBRIDE NAIL 6 OR MORE: CPT | Performed by: NURSE PRACTITIONER

## 2023-04-14 NOTE — PROGRESS NOTES
"Henrik Sands  1950  73 y.o. male   PCP: Lisa Stacy: 11/16/2022  BS: 114 per patient       04/14/2023    Chief Complaint   Patient presents with   • Left Foot - Follow-up   • Right Foot - Follow-up       History of Present Illness    Henrik Sands is a 73 y.o.male presents to the clinic today for diabetic foot care.       Past Medical History:   Diagnosis Date   • Abdominal bloating    • After-cataract with vision obscured     left   • Allergic rhinitis    • Arthritis     RA   • Artificial lens present     both   • Asthma     \"mild\"   • Colon cancer    • Colonic polyp     Polyp of large intestine - multiple      • Cranial nerve disorder, unspecified      right 3rd x 10days to 2wks      • Diabetes mellitus    • Disorder of skin     lesion to right forehead      • Essential hypertension     which may be renovascular in origin      • MEL (generalized anxiety disorder)    • Generalized colicky abdominal pain    • GERD (gastroesophageal reflux disease)    • History of colonic polyps    • History of echocardiogram 04/25/2008    normal systolic function,minimal eft atrial enlargement,aortic root upper limits of normal   • History of malignant neoplasm of colon    • Hyperlipidemia    • Hypokalemia     persistent   • Ischemic optic neuropathy of both eyes    • Kidney stone     with hyperoxaluria now for 1st time      • Long term use of drug    • Malaise and fatigue    • Neoplasm of uncertain behavior of skin    • 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   • Osteoarthritis    • Pain in lower limb    • Primary malignant neoplasm of colon    • Pseudophakia    • Renal impairment     stage 1   • Skin cancer     basal cell   • Stasis edema    • Type 2 diabetes mellitus     no BDR   • Vitamin D deficiency     on treatment         Past Surgical History:   Procedure Laterality Date   • CARDIAC CATHETERIZATION  04/25/2008    selective coronary angiography,right iliofemoral,mild nonobstructive CAD,potential for dilated ascending aorta " given the necessity of using a JL5   • CARDIAC CATHETERIZATION N/A 7/19/2018    Procedure: Left Heart Cath/ PCI if indicated;  Surgeon: Erik So MD;  Location: [facility] CATH INVASIVE LOCATION;  Service: Cardiovascular   • CATARACT EXTRACTION  06/18/2014    AFTER CATARACT LASER SURGERY 11784 (1)      • CATARACT EXTRACTION W/ INTRAOCULAR LENS IMPLANT Left 12/19/2006   • COLECTOMY PARTIAL / TOTAL  01/31/2007    low anterior resection of the colon with stapled colorectal anastomosis.Middle rectal cancer w/suspected sigmoid serosal implants at the rectosigmoid junction   • COLON RESECTION N/A 11/4/2022    Procedure: LAPAROSCOPIC TO OPEN RIGHT HEMICOLECTOMY AND LYSIS OF ADHESIONS;  Surgeon: Sylvester Fowler MD;  Location: [facility] OR;  Service: General;  Laterality: N/A;  converted to open at 1144   • COLONOSCOPY N/A 9/21/2022    Procedure: COLONOSCOPY--bx;  Surgeon: Nehemiah Ramirez MD;  Location: [facility] ENDOSCOPY;  Service: Gastroenterology;  Laterality: N/A;   • COLONOSCOPY W/ POLYPECTOMY  02/11/2016    Patent end-to-end colo-colonic anastomosis.Melanosis in the colon.One 2 mm polyp at 85 cm prox.to anus.Resected and retrieved.One 5 mm polyp at 80 cm prox to anus.Resected and retrieved.One 1 mm polyp at 40 cm prox. to anus.Resected and retrieved.   • EXCISION LESION  11/06/2001    Electrodesiccation and curettage x 3, right upper back   • EXTRACORPOREAL SHOCK WAVE LITHOTRIPSY (ESWL)  08/24/2005    right extracorporeal shock wave 1000 shocks at an energy level 9.Bilateral ureteral calculus,right one symptomatic.Stone 5 x7   • MEDIPORT INSERTION, SINGLE  04/11/2007    left subclavin Mediport,metastatic colon cancer         History reviewed. No pertinent family history.    Allergies   Allergen Reactions   • Latex Rash       Social History     Socioeconomic History   • Marital status:    Tobacco Use   • Smoking status: Never   • Smokeless tobacco: Never   Vaping Use   • Vaping Use: Never used    Substance and Sexual Activity   • Alcohol use: No   • Drug use: Never   • Sexual activity: Defer         Current Outpatient Medications   Medication Sig Dispense Refill   • albuterol (PROVENTIL HFA;VENTOLIN HFA) 108 (90 Base) MCG/ACT inhaler Inhale 2 puffs Every 4 (Four) Hours As Needed for Wheezing. 18 g 11   • allopurinol (ZYLOPRIM) 100 MG tablet Take 1 tablet by mouth 2 (Two) Times a Day. 180 tablet 0   • amLODIPine (NORVASC) 10 MG tablet Take 1 tablet by mouth Daily. 90 tablet 3   • apixaban (Eliquis) 5 MG tablet tablet Take 1 tablet by mouth Every 12 (Twelve) Hours. 180 tablet 0   • aspirin 81 MG EC tablet Take 1 tablet by mouth Every Night.     • glucose blood (FREESTYLE LITE) test strip Use to test blood sugar up to 3 times daily **Freestyle Lite** (Patient taking differently: Use to test blood sugar up to 3 times daily **Freestyle Lite**) 100 each 12   • hydroxychloroquine (PLAQUENIL) 200 MG tablet Take 1 tablet by mouth 2 (Two) Times a Day. 180 tablet 3   • hydrOXYzine (ATARAX) 10 MG tablet Take 1 to 2 tablets by mouth at night. 60 tablet 11   • Lancets (freestyle) lancets Use to test blood sugar up to 3 times daily **Freestyle** (Patient taking differently: Use to test blood sugar up to 3 times daily **Freestyle**) 100 each 12   • metoprolol succinate XL (TOPROL-XL) 50 MG 24 hr tablet Take 1 tablet by mouth Every Night. 90 tablet 3   • omeprazole (priLOSEC) 40 MG capsule Take 1 capsule by mouth Daily.     • potassium citrate (UROCIT-K) 10 MEQ (1080 MG) CR tablet Take 2 tablets by mouth 4 (Four) Times a Day for hypokalemia. 240 tablet 0   • Unable to find 1 each 1 (One) Time. Med Name:Physician's Choice Probiotic     • valsartan-hydrochlorothiazide (DIOVAN-HCT) 320-25 MG per tablet Take 1 tablet by mouth Daily. 90 tablet 0   • vitamin D (ERGOCALCIFEROL) 1.25 MG (09755 UT) capsule capsule Take 1 capsule by mouth Every 7 (Seven) Days. 4 capsule 11     No current facility-administered medications for this  "visit.       Review of Systems   Constitutional: Negative.    HENT: Negative.    Eyes: Negative.    Respiratory: Negative.    Cardiovascular: Negative.    Gastrointestinal: Negative.    Endocrine: Negative.    Genitourinary: Negative.    Musculoskeletal:        Foot  care   Skin: Negative.    Allergic/Immunologic: Negative.    Neurological: Negative.    Hematological: Negative.    Psychiatric/Behavioral: Negative.          OBJECTIVE    /72   Pulse 62   Ht 180.3 cm (70.98\")   Wt 93.9 kg (207 lb)   SpO2 98%   BMI 28.89 kg/m²     Physical Exam  Vitals reviewed.   Constitutional:       Appearance: Normal appearance. He is well-developed.   HENT:      Head: Normocephalic and atraumatic.   Neck:      Trachea: Trachea and phonation normal.   Cardiovascular:      Pulses:           Dorsalis pedis pulses are 1+ on the right side and 1+ on the left side.        Posterior tibial pulses are 1+ on the right side and 1+ on the left side.   Pulmonary:      Effort: Pulmonary effort is normal. No respiratory distress.   Abdominal:      General: There is no distension.      Palpations: Abdomen is soft.   Feet:      Right foot:      Protective Sensation: 10 sites tested. 10 sites sensed.      Toenail Condition: Right toenails are abnormally thick and long. Fungal disease present.     Left foot:      Protective Sensation: 10 sites tested. 10 sites sensed.      Toenail Condition: Left toenails are abnormally thick and long. Fungal disease present.  Skin:     General: Skin is warm and dry.   Neurological:      Mental Status: He is alert and oriented to person, place, and time.      GCS: GCS eye subscore is 4. GCS verbal subscore is 5. GCS motor subscore is 6.   Psychiatric:         Speech: Speech normal.         Behavior: Behavior normal. Behavior is cooperative.         Thought Content: Thought content normal.         Judgment: Judgment normal.                Procedures        ASSESSMENT AND PLAN    Diagnoses and all orders " for this visit:    1. Onychomycosis (Primary)    2. Diabetic foot    3. Type 2 diabetes mellitus with hypoglycemia without coma, without long-term current use of insulin    4. Toe pain, bilateral    5. Blind in both eyes        - A diabetic foot screening exam was performed and the patient was educated on the foot complications related to diabetes,  preventative foot care and what signs and symptoms to watch for.  Instructed to contact our office if any foot problems develop before next visit.  -Discussed treatments for  painful toenails. Treatment options discussed included nail removal versus debridement. Patient elected for routine nail debridement. An ABN has been signed by the patient.  - Toenails 1-5 bilateral were debrided in length and thickness with nail nipper and electric  to decrease fungal load and risk of infection.  - All the patients questions were answered.  - RTC 3 months or sooner if needed.           This document has been electronically signed by Chance SLATER, FNP-C, ONP-C on April 14, 2023 13:52 CDT

## 2023-04-17 ENCOUNTER — OFFICE VISIT (OUTPATIENT)
Dept: CARDIOLOGY | Facility: CLINIC | Age: 73
End: 2023-04-17
Payer: MEDICARE

## 2023-04-17 VITALS
HEART RATE: 56 BPM | SYSTOLIC BLOOD PRESSURE: 142 MMHG | BODY MASS INDEX: 29.54 KG/M2 | OXYGEN SATURATION: 96 % | TEMPERATURE: 96.1 F | DIASTOLIC BLOOD PRESSURE: 72 MMHG | WEIGHT: 211 LBS | HEIGHT: 71 IN

## 2023-04-17 DIAGNOSIS — I10 ESSENTIAL HYPERTENSION: Primary | ICD-10-CM

## 2023-04-17 DIAGNOSIS — R00.2 PALPITATION: ICD-10-CM

## 2023-04-17 DIAGNOSIS — E78.2 MIXED HYPERLIPIDEMIA: ICD-10-CM

## 2023-04-17 DIAGNOSIS — E11.649 TYPE 2 DIABETES MELLITUS WITH HYPOGLYCEMIA WITHOUT COMA, WITHOUT LONG-TERM CURRENT USE OF INSULIN: ICD-10-CM

## 2023-04-17 DIAGNOSIS — I48.0 AF (PAROXYSMAL ATRIAL FIBRILLATION): ICD-10-CM

## 2023-04-17 LAB
QT INTERVAL: 422 MS
QTC INTERVAL: 407 MS

## 2023-04-17 PROCEDURE — 93000 ELECTROCARDIOGRAM COMPLETE: CPT | Performed by: INTERNAL MEDICINE

## 2023-04-17 PROCEDURE — 1160F RVW MEDS BY RX/DR IN RCRD: CPT | Performed by: INTERNAL MEDICINE

## 2023-04-17 PROCEDURE — 99214 OFFICE O/P EST MOD 30 MIN: CPT | Performed by: INTERNAL MEDICINE

## 2023-04-17 PROCEDURE — 3077F SYST BP >= 140 MM HG: CPT | Performed by: INTERNAL MEDICINE

## 2023-04-17 PROCEDURE — 1159F MED LIST DOCD IN RCRD: CPT | Performed by: INTERNAL MEDICINE

## 2023-04-17 PROCEDURE — 3078F DIAST BP <80 MM HG: CPT | Performed by: INTERNAL MEDICINE

## 2023-04-17 RX ORDER — METOPROLOL SUCCINATE 25 MG/1
25 TABLET, EXTENDED RELEASE ORAL DAILY
Qty: 90 TABLET | Refills: 3 | Status: SHIPPED | OUTPATIENT
Start: 2023-04-17

## 2023-04-17 NOTE — PROGRESS NOTES
Henrik Sands  73 y.o. male      1. Essential hypertension    2. AF (paroxysmal atrial fibrillation)    3. Mixed hyperlipidemia    4. Palpitation    5. Type 2 diabetes mellitus with hypoglycemia without coma, without long-term current use of insulin        History of Present Illness  Mr. Sands is a 73-year-old male with hypertension, diabetes mellitus, history of colon carcinoma status post resection and chemotherapy in 2007, CKD, gastroesophageal reflux and noncritical CAD by cardiac catheterization in July 2018, after an abnormal CT angiogram of the coronary arteries.    Cardiac catheterization in 7/20/2018 showed:  Left main coronary artery: This was a patent vessel with minor plaques which divided into a left anterior descending coronary artery, ramus intermedius coronary artery and left circumflex coronary artery  Left anterior descending coronary artery: This was a medium-sized vessel with minor luminal irregularities and calcification in the proximal segment, mild ectatic segment in the mid Left Anterior Descending Coronary Artery with minor calcification and at the point of origin of diagonal 2 there was an eccentric noncritical disease up to 40%.  The mid and distal LAD was a small caliber vessel.  Diagonal 1 was a patent vessel.  No flow-limiting lesions noted in the LAD  Ramus intermedius coronary artery: This was a medium size vessel which was patent  Left circumflex coronary artery : This was a medium-sized tortuous vessel which was patent with minor luminal irregularities  Right Coronary Artery: This was a dominant vessel with minor plaques and calcification the proximal and midsegment with no flow-limiting lesions.  Noncritical disease up to 20-30% was noted in the mid RCA and distal RCA had minor plaques.  PDA and PLV branches were patent.  Left ventriculogram: Left ventricular systolic function was normal with an ejection fraction of 55% with no wall motion abnormalities.  Aortic pressure  was 110/55 and left ventricular pressure was 118/6 mmHg.  Impression: Normal left ventricular systolic function with no wall motion abnormalities.  Estimated ejection fraction was 55%.  Noncritical lesions noted in the coronary arteries as described above.  Chest pain most likely noncardiac.      In November 2022, the patient underwent Laparoscopic converted to open right hemicolectomy, and during the recovery, the patient was noted to have delirium.  Apparently an EKG raise question of atrial fibrillation.  I did review the EKG and there was baseline artifact that made it difficult to interpret.  However I agree that it atrial arrhythmia cannot entirely be ruled out.    An event monitor in January 2023 showed:  •  An abnormal monitor study.  •  Patient had a minimum heart rate of 46 bpm, maximal heart rate 140 bpm and average heart rate of 65 bpm.   Predominant underlying rhythm was sinus rhythm. PVCs were rare less than 1% PACs were frequent at 6%.  Patient had evidence of paroxysmal atrial fibrillation, the longest episode lasting 24 minutes with a total burden of 0.1%.    Echocardiogram in March 2023 showed:   Left ventricular systolic function is normal. Calculated left ventricular EF = 60% Left ventricular ejection fraction appears to be 56 - 60%.  •  Left ventricular wall thickness is consistent with mild concentric hypertrophy.  •  Definity image enhancer in order to optimize the study.  •  Mild AI, Trace to Mild MR, TR  •  RVSP less than 35 mm hg    EKG today showed sinus rhythm with first-degree AV block.  SC interval 312 ms. Baseline wander.    He denied any palpitation, chest pain or dyspnea.  He has occasional dizziness but no syncope    SUBJECTIVE    Allergies   Allergen Reactions   • Latex Rash         Past Medical History:   Diagnosis Date   • Abdominal bloating    • After-cataract with vision obscured     left   • Allergic rhinitis    • Arthritis     RA   • Artificial lens present     both   •  "Asthma     \"mild\"   • Colon cancer    • Colonic polyp     Polyp of large intestine - multiple      • Cranial nerve disorder, unspecified      right 3rd x 10days to 2wks      • Diabetes mellitus    • Disorder of skin     lesion to right forehead      • Essential hypertension     which may be renovascular in origin      • MEL (generalized anxiety disorder)    • Generalized colicky abdominal pain    • GERD (gastroesophageal reflux disease)    • History of colonic polyps    • History of echocardiogram 04/25/2008    normal systolic function,minimal eft atrial enlargement,aortic root upper limits of normal   • History of malignant neoplasm of colon    • Hyperlipidemia    • Hypokalemia     persistent   • Ischemic optic neuropathy of both eyes    • Kidney stone     with hyperoxaluria now for 1st time      • Long term use of drug    • Malaise and fatigue    • Neoplasm of uncertain behavior of skin    • 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   • Osteoarthritis    • Pain in lower limb    • Primary malignant neoplasm of colon    • Pseudophakia    • Renal impairment     stage 1   • Skin cancer     basal cell   • Stasis edema    • Type 2 diabetes mellitus     no BDR   • Vitamin D deficiency     on treatment         Past Surgical History:   Procedure Laterality Date   • CARDIAC CATHETERIZATION  04/25/2008    selective coronary angiography,right iliofemoral,mild nonobstructive CAD,potential for dilated ascending aorta given the necessity of using a JL5   • CARDIAC CATHETERIZATION N/A 7/19/2018    Procedure: Left Heart Cath/ PCI if indicated;  Surgeon: Erik So MD;  Location: [facility] INVASIVE LOCATION;  Service: Cardiovascular   • CATARACT EXTRACTION  06/18/2014    AFTER CATARACT LASER SURGERY 33087 (1)      • CATARACT EXTRACTION W/ INTRAOCULAR LENS IMPLANT Left 12/19/2006   • COLECTOMY PARTIAL / TOTAL  01/31/2007    low anterior resection of the colon with stapled colorectal anastomosis.Middle rectal cancer w/suspected sigmoid " serosal implants at the rectosigmoid junction   • COLON RESECTION N/A 11/4/2022    Procedure: LAPAROSCOPIC TO OPEN RIGHT HEMICOLECTOMY AND LYSIS OF ADHESIONS;  Surgeon: Sylvester Fowler MD;  Location: [facility] OR;  Service: General;  Laterality: N/A;  converted to open at 1144   • COLONOSCOPY N/A 9/21/2022    Procedure: COLONOSCOPY--bx;  Surgeon: Nehemiah Ramirez MD;  Location: [facility] ENDOSCOPY;  Service: Gastroenterology;  Laterality: N/A;   • COLONOSCOPY W/ POLYPECTOMY  02/11/2016    Patent end-to-end colo-colonic anastomosis.Melanosis in the colon.One 2 mm polyp at 85 cm prox.to anus.Resected and retrieved.One 5 mm polyp at 80 cm prox to anus.Resected and retrieved.One 1 mm polyp at 40 cm prox. to anus.Resected and retrieved.   • EXCISION LESION  11/06/2001    Electrodesiccation and curettage x 3, right upper back   • EXTRACORPOREAL SHOCK WAVE LITHOTRIPSY (ESWL)  08/24/2005    right extracorporeal shock wave 1000 shocks at an energy level 9.Bilateral ureteral calculus,right one symptomatic.Stone 5 x7   • MEDIPORT INSERTION, SINGLE  04/11/2007    left subclavin Mediport,metastatic colon cancer         History reviewed. No pertinent family history.      Social History     Socioeconomic History   • Marital status:    Tobacco Use   • Smoking status: Never   • Smokeless tobacco: Never   Vaping Use   • Vaping Use: Never used   Substance and Sexual Activity   • Alcohol use: No   • Drug use: Never   • Sexual activity: Defer         Current Outpatient Medications   Medication Sig Dispense Refill   • albuterol (PROVENTIL HFA;VENTOLIN HFA) 108 (90 Base) MCG/ACT inhaler Inhale 2 puffs Every 4 (Four) Hours As Needed for Wheezing. 18 g 11   • allopurinol (ZYLOPRIM) 100 MG tablet Take 1 tablet by mouth 2 (Two) Times a Day. 180 tablet 0   • amLODIPine (NORVASC) 10 MG tablet Take 1 tablet by mouth Daily. 90 tablet 3   • apixaban (Eliquis) 5 MG tablet tablet Take 1 tablet by mouth Every 12 (Twelve) Hours. 180 tablet 0   •  "aspirin 81 MG EC tablet Take 1 tablet by mouth Every Night.     • glucose blood (FREESTYLE LITE) test strip Use to test blood sugar up to 3 times daily **Freestyle Lite** (Patient taking differently: Use to test blood sugar up to 3 times daily **Freestyle Lite**) 100 each 12   • hydroxychloroquine (PLAQUENIL) 200 MG tablet Take 1 tablet by mouth 2 (Two) Times a Day. 180 tablet 3   • hydrOXYzine (ATARAX) 10 MG tablet Take 1 to 2 tablets by mouth at night. 60 tablet 11   • Lancets (freestyle) lancets Use to test blood sugar up to 3 times daily **Freestyle** (Patient taking differently: Use to test blood sugar up to 3 times daily **Freestyle**) 100 each 12   • metoprolol succinate XL (TOPROL-XL) 50 MG 24 hr tablet Take 1 tablet by mouth Every Night. 90 tablet 3   • omeprazole (priLOSEC) 40 MG capsule Take 1 capsule by mouth Daily.     • potassium citrate (UROCIT-K) 10 MEQ (1080 MG) CR tablet Take 2 tablets by mouth 4 (Four) Times a Day for hypokalemia. 240 tablet 0   • Unable to find 1 each 1 (One) Time. Med Name:Physician's Choice Probiotic     • valsartan-hydrochlorothiazide (DIOVAN-HCT) 320-25 MG per tablet Take 1 tablet by mouth Daily. 90 tablet 0   • vitamin D (ERGOCALCIFEROL) 1.25 MG (76519 UT) capsule capsule Take 1 capsule by mouth Every 7 (Seven) Days. 4 capsule 11     No current facility-administered medications for this visit.         OBJECTIVE    /72 (BP Location: Left arm, Patient Position: Sitting, Cuff Size: Adult)   Pulse 56   Temp 96.1 °F (35.6 °C)   Ht 180.3 cm (71\")   Wt 95.7 kg (211 lb)   SpO2 96%   BMI 29.43 kg/m²         Review of Systems: The following systems were reviewed and changes noted as indicated in the history of present illness and below    Constitutional:  Denies recent weight loss, weight gain, fever or chills     HENT:  hearing loss, epistaxis, no hoarseness, or difficulty speaking.     Eyes: Visual loss both eyes.    Respiratory:  Denies dyspnea with exertion, no cough, " wheezing, or hemoptysis.     Cardiovascular: No chest pain.  Occasional dizziness.  No syncope    Gastrointestinal: Recent laparoscopic right hemicolectomy, history of colon carcinoma, colonic polyps, appendiceal tumor    Endocrine: Negative for cold intolerance, heat intolerance, polydipsia, polyphagia and polyuria.     Genitourinary: Negative.      Musculoskeletal: Denies any history of arthritic symptoms or back problems     Skin:  Denies any change in hair or nails, rashes, or skin lesions.     Allergic/Immunologic: Negative.  Negative for environmental allergies, food allergies and/or immunocompromised state.     Neurological:  Denies any history of recurrent headaches, strokes, TIA, or seizure disorder.     Hematological: Denies any food allergies, seasonal allergies, bleeding disorders, or lymphadenopathy.     Psychiatric/Behavioral: Denies any history of depression, substance abuse, or change in cognitive function.       Physical Exam: The following systems were reviewed and changes noted indicated below    Constitutional: Cooperative, alert and oriented, in no acute distress.     HENT:   Head: Normocephalic, normal hair patterns, no masses or tenderness.  Ears, Nose, and Throat: No gross abnormalities. No pallor or cyanosis. Dentition good.   Eyes: EOMS intact, PERRL, conjunctivae and lids unremarkable. Fundoscopic exam and visual fields not performed.   Neck: No palpable masses or adenopathy, no thyromegaly, no JVD, carotid pulses are full and equal bilaterally and without bruit.     Cardiovascular: Regular rhythm, S1 and S2 normal, no S3 or S4.  No murmurs, gallops, or rubs detected.  Occasional ectopic beats    Pulmonary/Chest: Chest: normal symmetry, normal respiratory excursion, no intercostal retraction, no use of accessory muscles.     Pulmonary: Normal breath sounds. No rales or rhonchi.    Abdominal: Abdomen soft, bowel sounds normoactive, no masses, no hepatosplenomegaly, nontender, no bruit.      Musculoskeletal: No deformities, clubbing, cyanosis, erythema, or edema observed.     Neurological: No gross motor or sensory deficits noted, affect appropriate, oriented to time, person, place.     Skin: Warm and dry to the touch, no apparent skin lesion or mass noted.     Psychiatric: He has a normal mood and affect. His behavior is normal. Judgment and thought content normal.         Procedures      Lab Results   Component Value Date    WBC 8.72 11/08/2022    HGB 14.2 11/08/2022    HCT 41.9 11/08/2022    MCV 85.7 11/08/2022     11/08/2022     Lab Results   Component Value Date    GLUCOSE 100 (H) 11/08/2022    BUN 12 11/08/2022    CREATININE 1.05 11/08/2022    EGFRIFNONA 58 (L) 12/02/2020    BCR 11.4 11/08/2022    CO2 23.0 11/08/2022    CALCIUM 9.2 11/08/2022    ALBUMIN 3.70 11/04/2022    AST 22 11/04/2022    ALT 11 11/04/2022     Lab Results   Component Value Date    CHOL 156 06/10/2022    CHOL 152 12/02/2020    CHOL 168 03/05/2019     Lab Results   Component Value Date    TRIG 71 06/10/2022    TRIG 94 12/02/2020    TRIG 105 03/05/2019     Lab Results   Component Value Date    HDL 40 06/10/2022    HDL 39 (L) 12/02/2020    HDL 32 (L) 03/05/2019     No components found for: LDLCALC  Lab Results   Component Value Date     (H) 06/10/2022    LDL 95 12/02/2020    LDL 99 03/05/2019     No results found for: HDLLDLRATIO  No components found for: CHOLHDL  Lab Results   Component Value Date    HGBA1C 5.70 (H) 12/02/2020     Lab Results   Component Value Date    TSH 2.140 06/10/2022           ASSESSMENT AND PLAN  Henrik Sands is a 73-year-old male with multiple medical issues as discussed in detail in the history of present illness.  He does have sinus bradycardia on EKG with a prolonged SD interval.  He has not had any definite reoccurrence of atrial fibrillation that he is aware of though he has occasional skipped beats.  Event monitor back in January 2023 did show paroxysmal atrial  fibrillation.    After reviewing his medicines, I have continued antiplatelet therapy with aspirin, anticoagulation with Eliquis, antihypertensive therapy with valsartan HCTZ in a.m. and amlodipine in p.m.  I have cut back on the dose of metoprolol succinate to 25 mg daily    He has been advised to maintain a high fluid intake.  He will continue to keep a close watch on his blood pressure and heart rate.    Diagnoses and all orders for this visit:    1. Essential hypertension (Primary)  -     ECG 12 Lead    2. AF (paroxysmal atrial fibrillation)    3. Mixed hyperlipidemia    4. Palpitation    5. Type 2 diabetes mellitus with hypoglycemia without coma, without long-term current use of insulin        BMI is >= 25 and <30. (Overweight) The following options were offered after discussion;: nutrition counseling/recommendations      Henrik Sands  reports that he has never smoked. He has never used smokeless tobacco.             Erik So MD  4/17/2023  13:17 CDT

## 2023-05-25 RX ORDER — ERGOCALCIFEROL 1.25 MG/1
50000 CAPSULE ORAL
Qty: 4 CAPSULE | Refills: 11 | Status: SHIPPED | OUTPATIENT
Start: 2023-05-25

## 2023-06-04 RX ORDER — ALLOPURINOL 100 MG/1
100 TABLET ORAL 2 TIMES DAILY
Qty: 180 TABLET | Refills: 0 | Status: CANCELLED | OUTPATIENT
Start: 2023-06-04

## 2023-06-05 RX ORDER — ALLOPURINOL 100 MG/1
100 TABLET ORAL 2 TIMES DAILY
Qty: 180 TABLET | Refills: 3 | Status: SHIPPED | OUTPATIENT
Start: 2023-06-05

## 2023-06-12 RX ORDER — POTASSIUM CITRATE 10 MEQ/1
20 TABLET, EXTENDED RELEASE ORAL 4 TIMES DAILY
Qty: 240 TABLET | Refills: 3 | Status: SHIPPED | OUTPATIENT
Start: 2023-06-12

## 2023-09-18 ENCOUNTER — OFFICE VISIT (OUTPATIENT)
Dept: PODIATRY | Facility: CLINIC | Age: 73
End: 2023-09-18
Payer: MEDICARE

## 2023-09-18 VITALS
HEART RATE: 61 BPM | HEIGHT: 71 IN | OXYGEN SATURATION: 95 % | SYSTOLIC BLOOD PRESSURE: 120 MMHG | WEIGHT: 211 LBS | DIASTOLIC BLOOD PRESSURE: 62 MMHG | BODY MASS INDEX: 29.54 KG/M2

## 2023-09-18 DIAGNOSIS — E11.8 DIABETIC FOOT: ICD-10-CM

## 2023-09-18 DIAGNOSIS — B35.1 ONYCHOMYCOSIS: Primary | ICD-10-CM

## 2023-09-18 DIAGNOSIS — E11.649 TYPE 2 DIABETES MELLITUS WITH HYPOGLYCEMIA WITHOUT COMA, WITHOUT LONG-TERM CURRENT USE OF INSULIN: ICD-10-CM

## 2023-09-18 DIAGNOSIS — M79.674 TOE PAIN, BILATERAL: ICD-10-CM

## 2023-09-18 DIAGNOSIS — H54.3 BLIND IN BOTH EYES: ICD-10-CM

## 2023-09-18 DIAGNOSIS — M79.675 TOE PAIN, BILATERAL: ICD-10-CM

## 2023-09-18 PROCEDURE — 3078F DIAST BP <80 MM HG: CPT | Performed by: NURSE PRACTITIONER

## 2023-09-18 PROCEDURE — 99213 OFFICE O/P EST LOW 20 MIN: CPT | Performed by: NURSE PRACTITIONER

## 2023-09-18 PROCEDURE — 3074F SYST BP LT 130 MM HG: CPT | Performed by: NURSE PRACTITIONER

## 2023-09-18 NOTE — PROGRESS NOTES
"Henrik Sands  1950  73 y.o. male   PCP: Lisa Stacy: 11/16/2022  BS: 100 per patient   Reviewed prior exam notes/labs/tracings    09/18/2023    Chief Complaint   Patient presents with    Left Foot - Follow-up     Diabetic nail debridement     Right Foot - Follow-up     Diabetic nail debridement        History of Present Illness    Henrik Sands is a 73 y.o.male presents to the clinic today for diabetic foot care.       Past Medical History:   Diagnosis Date    Abdominal bloating     After-cataract with vision obscured     left    Allergic rhinitis     Arthritis     RA    Artificial lens present     both    Asthma     \"mild\"    Colon cancer     Colonic polyp     Polyp of large intestine - multiple       Cranial nerve disorder, unspecified      right 3rd x 10days to 2wks       Diabetes mellitus     Disorder of skin     lesion to right forehead       Essential hypertension     which may be renovascular in origin       MEL (generalized anxiety disorder)     Generalized colicky abdominal pain     GERD (gastroesophageal reflux disease)     History of colonic polyps     History of echocardiogram 04/25/2008    normal systolic function,minimal eft atrial enlargement,aortic root upper limits of normal    History of malignant neoplasm of colon     Hyperlipidemia     Hypokalemia     persistent    Ischemic optic neuropathy of both eyes     Kidney stone     with hyperoxaluria now for 1st time       Long term use of drug     Malaise and fatigue     Neoplasm of uncertain behavior of skin     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    Osteoarthritis     Pain in lower limb     Primary malignant neoplasm of colon     Pseudophakia     Renal impairment     stage 1    Skin cancer     basal cell    Stasis edema     Type 2 diabetes mellitus     no BDR    Vitamin D deficiency     on treatment         Past Surgical History:   Procedure Laterality Date    CARDIAC CATHETERIZATION  04/25/2008    selective coronary angiography,right " iliofemoral,mild nonobstructive CAD,potential for dilated ascending aorta given the necessity of using a JL5    CARDIAC CATHETERIZATION N/A 7/19/2018    Procedure: Left Heart Cath/ PCI if indicated;  Surgeon: Erik So MD;  Location: [facility] CATH INVASIVE LOCATION;  Service: Cardiovascular    CATARACT EXTRACTION  06/18/2014    AFTER CATARACT LASER SURGERY 71325 (1)       CATARACT EXTRACTION W/ INTRAOCULAR LENS IMPLANT Left 12/19/2006    COLECTOMY PARTIAL / TOTAL  01/31/2007    low anterior resection of the colon with stapled colorectal anastomosis.Middle rectal cancer w/suspected sigmoid serosal implants at the rectosigmoid junction    COLON RESECTION N/A 11/4/2022    Procedure: LAPAROSCOPIC TO OPEN RIGHT HEMICOLECTOMY AND LYSIS OF ADHESIONS;  Surgeon: Sylvester Fowler MD;  Location: [facility] OR;  Service: General;  Laterality: N/A;  converted to open at 1144    COLONOSCOPY N/A 9/21/2022    Procedure: COLONOSCOPY--bx;  Surgeon: Nehemiah Ramirez MD;  Location: [facility] ENDOSCOPY;  Service: Gastroenterology;  Laterality: N/A;    COLONOSCOPY W/ POLYPECTOMY  02/11/2016    Patent end-to-end colo-colonic anastomosis.Melanosis in the colon.One 2 mm polyp at 85 cm prox.to anus.Resected and retrieved.One 5 mm polyp at 80 cm prox to anus.Resected and retrieved.One 1 mm polyp at 40 cm prox. to anus.Resected and retrieved.    EXCISION LESION  11/06/2001    Electrodesiccation and curettage x 3, right upper back    EXTRACORPOREAL SHOCK WAVE LITHOTRIPSY (ESWL)  08/24/2005    right extracorporeal shock wave 1000 shocks at an energy level 9.Bilateral ureteral calculus,right one symptomatic.Stone 5 x7    MEDIPORT INSERTION, SINGLE  04/11/2007    left subclavin Mediport,metastatic colon cancer         History reviewed. No pertinent family history.    Allergies   Allergen Reactions    Latex Rash       Social History     Socioeconomic History    Marital status:    Tobacco Use    Smoking status: Never    Smokeless  tobacco: Never   Vaping Use    Vaping Use: Never used   Substance and Sexual Activity    Alcohol use: No    Drug use: Never    Sexual activity: Defer         Current Outpatient Medications   Medication Sig Dispense Refill    albuterol (PROVENTIL HFA;VENTOLIN HFA) 108 (90 Base) MCG/ACT inhaler Inhale 2 puffs Every 4 (Four) Hours As Needed for Wheezing. 18 g 11    allopurinol (ZYLOPRIM) 100 MG tablet Take 1 tablet by mouth 2 (Two) Times a Day. 180 tablet 3    amLODIPine (NORVASC) 10 MG tablet Take 1 tablet by mouth Daily. 90 tablet 3    apixaban (Eliquis) 5 MG tablet tablet Take 1 tablet by mouth Every 12 (Twelve) Hours. 180 tablet 3    aspirin 81 MG EC tablet Take 1 tablet by mouth Every Night.      glucose blood (FREESTYLE LITE) test strip Use to test blood sugar up to 3 times daily **Freestyle Lite** (Patient taking differently: Use to test blood sugar up to 3 times daily **Freestyle Lite**) 100 each 12    hydroxychloroquine (PLAQUENIL) 200 MG tablet Take 1 tablet by mouth 2 (Two) Times a Day. 180 tablet 3    hydrOXYzine (ATARAX) 10 MG tablet Take 1 to 2 tablets by mouth at night. 60 tablet 11    Lancets (freestyle) lancets Use to test blood sugar up to 3 times daily **Freestyle** (Patient taking differently: Use to test blood sugar up to 3 times daily **Freestyle**) 100 each 12    metoprolol succinate XL (TOPROL-XL) 25 MG 24 hr tablet Take 1 tablet by mouth Daily. 90 tablet 3    omeprazole (priLOSEC) 40 MG capsule Take 1 capsule by mouth Daily.      potassium citrate (UROCIT-K) 10 MEQ (1080 MG) CR tablet Take 2 tablets by mouth 4 (Four) Times a Day for hypokalemia. 240 tablet 3    Unable to find 1 each 1 (One) Time. Med Name:Physician's Choice Probiotic      valsartan-hydrochlorothiazide (DIOVAN-HCT) 320-25 MG per tablet Take 1 tablet by mouth Daily. 90 tablet 0    vitamin D (ERGOCALCIFEROL) 1.25 MG (63365 UT) capsule capsule Take 1 capsule by mouth Every 7 (Seven) Days. 4 capsule 11     No current  "facility-administered medications for this visit.       Review of Systems   Constitutional: Negative.    HENT: Negative.     Eyes: Negative.    Respiratory: Negative.     Cardiovascular: Negative.    Gastrointestinal: Negative.    Endocrine: Negative.    Genitourinary: Negative.    Musculoskeletal:         Foot  care   Skin: Negative.    Allergic/Immunologic: Negative.    Neurological: Negative.    Hematological: Negative.    Psychiatric/Behavioral: Negative.         OBJECTIVE    /62   Pulse 61   Ht 180.3 cm (71\")   Wt 95.7 kg (211 lb)   SpO2 95%   BMI 29.43 kg/m²     Physical Exam  Vitals reviewed.   Constitutional:       Appearance: Normal appearance. He is well-developed.   HENT:      Head: Normocephalic and atraumatic.   Neck:      Trachea: Trachea and phonation normal.   Cardiovascular:      Pulses:           Dorsalis pedis pulses are 1+ on the right side and 1+ on the left side.        Posterior tibial pulses are 1+ on the right side and 1+ on the left side.   Pulmonary:      Effort: Pulmonary effort is normal. No respiratory distress.   Abdominal:      General: There is no distension.      Palpations: Abdomen is soft.   Feet:      Right foot:      Protective Sensation: 10 sites tested.  10 sites sensed.      Toenail Condition: Right toenails are abnormally thick and long. Fungal disease present.     Left foot:      Protective Sensation: 10 sites tested.  10 sites sensed.      Toenail Condition: Left toenails are abnormally thick and long. Fungal disease present.  Skin:     General: Skin is warm and dry.   Neurological:      Mental Status: He is alert and oriented to person, place, and time.      GCS: GCS eye subscore is 4. GCS verbal subscore is 5. GCS motor subscore is 6.   Psychiatric:         Speech: Speech normal.         Behavior: Behavior normal. Behavior is cooperative.         Thought Content: Thought content normal.         Judgment: Judgment normal.    "           Procedures        ASSESSMENT AND PLAN    Diagnoses and all orders for this visit:    1. Onychomycosis (Primary)    2. Type 2 diabetes mellitus with hypoglycemia without coma, without long-term current use of insulin    3. Diabetic foot    4. Blind in both eyes    5. Toe pain, bilateral        - A diabetic foot screening exam was performed and the patient was educated on the foot complications related to diabetes,  preventative foot care and what signs and symptoms to watch for.  Instructed to contact our office if any foot problems develop before next visit.  -Discussed treatments for  painful toenails. Treatment options discussed included nail removal versus debridement. Patient elected for routine nail debridement. An ABN has been signed by the patient.  - Toenails 1-5 bilateral were debrided in length and thickness with nail nipper and electric  to decrease fungal load and risk of infection.  - All the patients questions were answered.  - RTC 3 months or sooner if needed.           This document has been electronically signed by Chance SLATER, FNP-C, ONP-C on September 18, 2023 13:54 CDT

## (undated) DEVICE — STERILE POLYISOPRENE POWDER-FREE SURGICAL GLOVES WITH EMOLLIENT COATING: Brand: PROTEXIS

## (undated) DEVICE — LIGHT HANDLE: Brand: DEVON

## (undated) DEVICE — ENDOPATH XCEL BLADELESS TROCARS WITH STABILITY SLEEVES: Brand: ENDOPATH XCEL

## (undated) DEVICE — STERILE POLYISOPRENE POWDER-FREE SURGICAL GLOVES: Brand: PROTEXIS

## (undated) DEVICE — GLV SURG SENSICARE POLYISPRN W/ALOE PF LF 6.5 GRN STRL

## (undated) DEVICE — ANGIO-SEAL EVOLUTION VASCULAR CLOSURE DEVICE: Brand: ANGIO-SEAL

## (undated) DEVICE — SOL IRR NACL 0.9PCT BT 1000ML

## (undated) DEVICE — SPNG LAP 18X18IN LF STRL PK/5

## (undated) DEVICE — HARMONIC ACE +7 LAPAROSCOPIC SHEARS ADVANCED HEMOSTASIS 5MM DIAMETER 36CM SHAFT LENGTH  FOR USE WITH GRAY HAND PIECE ONLY: Brand: HARMONIC ACE

## (undated) DEVICE — SUT PDS 3/0 SH 27IN DYED Z316H

## (undated) DEVICE — SUT PDS CLOSURE CT1 1/0 27IN Z341H

## (undated) DEVICE — SUT PROLN 2/0 SH 36IN 8523H

## (undated) DEVICE — ENSEAL 20 CM SHAFT, LARGE JAW: Brand: ENSEAL X1

## (undated) DEVICE — RESERVOIR,SUCTION,100CC,SILICONE: Brand: MEDLINE

## (undated) DEVICE — ARTERIAL NEEDLE: Brand: UNBRANDED

## (undated) DEVICE — GW PERIPH GUIDERIGHT STD/J/TP PTFE/PCOAT SS 0.038IN 5X150CM

## (undated) DEVICE — PROXIMATE SKIN STAPLERS (35 WIDE) CONTAINS 35 STAINLESS STEEL STAPLES (FIXED HEAD): Brand: PROXIMATE

## (undated) DEVICE — SUT NLY 2/0 664G

## (undated) DEVICE — DRSNG WND BORDR/ADHS NONADHR/GZ LF 4X10IN STRL

## (undated) DEVICE — MODEL BT2000 P/N 700287-012KIT CONTENTS: MANIFOLD WITH SALINE AND CONTRAST PORTS, SALINE TUBING WITH SPIKE AND HAND SYRINGE, TRANSDUCER: Brand: BT2000 AUTOMATED MANIFOLD KIT

## (undated) DEVICE — TRAY,IRRIGATION,BULBSYRINGE,60ML,CSR,PVP: Brand: MEDLINE

## (undated) DEVICE — CORE TRUMPET FOR SINGLE SOLUTION BAG: Brand: CORE DYNAMICS

## (undated) DEVICE — PK CATH LAB 60

## (undated) DEVICE — SUT MONOCRYL 4/0 PS2 27IN Y426H ETY426H

## (undated) DEVICE — SUT VIC 2/0 SH 27IN

## (undated) DEVICE — APPL CHLORAPREP HI/LITE 26ML ORNG

## (undated) DEVICE — SPNG DRN AMD EXCILON 6PLY 4X4IN PK/2

## (undated) DEVICE — ACCESS PLATFORM FOR MINIMALLY INVASIVE SURGERY.: Brand: GELPORT® LAPAROSCOPIC  SYSTEM

## (undated) DEVICE — PK LAP CHOLE LF 60

## (undated) DEVICE — MONOPOLAR METZENBAUM SCISSOR TIP, DISPOSABLE: Brand: MONOPOLAR METZENBAUM SCISSOR TIP, DISPOSABLE

## (undated) DEVICE — SUT VICRYL 0 TIES J112T

## (undated) DEVICE — TBG PENCL TELESCP MEGADYNE SMOKE EVAC 10FT

## (undated) DEVICE — FRCP BIOP RADLJAW4 HOT 2.2X240 BX40

## (undated) DEVICE — GLV SURG SENSICARE PI ORTHO SZ6.5 LF STRL

## (undated) DEVICE — A2000 MULTI-USE SYRINGE KIT, P/N 701277-003KIT CONTENTS: 100ML CONTRAST RESERVOIR AND TUBING WITH CONTRAST SPIKE AND CLAMP: Brand: A2000 MULTI-USE SYRINGE KIT

## (undated) DEVICE — CANN SMPL SOFTECH BIFLO ETCO2 A/M 7FT

## (undated) DEVICE — DRN WND JP RND W TROC SIL 19F 1/4IN

## (undated) DEVICE — INTRO SHEATH ART/FEM ENGAGE .038 6F12CM

## (undated) DEVICE — LAPAROVUE VISIBILITY SYSTEM LAPAROSCOPIC SOLUTIONS: Brand: LAPAROVUE

## (undated) DEVICE — SOL IRRIG NACL 1000ML

## (undated) DEVICE — GLV SURG SENSICARE POLYISPRN W/ALOE PF LF 6 GRN STRL

## (undated) DEVICE — ELECTRODE,RT,STRESS,FOAM,50PK: Brand: MEDLINE

## (undated) DEVICE — MODEL AT P65, P/N 701554-001KIT CONTENTS: HAND CONTROLLER, 3-WAY HIGH-PRESSURE STOPCOCK WITH ROTATING END AND PREMIUM HIGH-PRESSURE TUBING: Brand: ANGIOTOUCH® KIT

## (undated) DEVICE — GLV SURG SENSICARE PI PF LF 7 GRN STRL

## (undated) DEVICE — GOWN,AURORA,NOREINF,RAGLAN,XL,STERILE: Brand: MEDLINE

## (undated) DEVICE — SUT VICRYL 3-0 SH-1 PO 18IN J772D

## (undated) DEVICE — TOTAL TRAY, 16FR 10ML SIL FOLEY, URN: Brand: MEDLINE

## (undated) DEVICE — CATH DIAG EXPO M/ PK 6FR FL4/FR4 PIG 3PK

## (undated) DEVICE — SUT VIC 3/0 TIES 18IN J110T

## (undated) DEVICE — WOUND RETRACTOR AND PROTECTOR: Brand: ALEXIS O WOUND PROTECTOR-RETRACTOR